# Patient Record
Sex: FEMALE | Race: BLACK OR AFRICAN AMERICAN | Employment: FULL TIME | URBAN - METROPOLITAN AREA
[De-identification: names, ages, dates, MRNs, and addresses within clinical notes are randomized per-mention and may not be internally consistent; named-entity substitution may affect disease eponyms.]

---

## 2017-11-13 ENCOUNTER — OFFICE VISIT (OUTPATIENT)
Dept: INTERNAL MEDICINE CLINIC | Age: 52
End: 2017-11-13

## 2017-11-13 VITALS
HEART RATE: 68 BPM | BODY MASS INDEX: 37.36 KG/M2 | OXYGEN SATURATION: 98 % | HEIGHT: 62 IN | DIASTOLIC BLOOD PRESSURE: 82 MMHG | SYSTOLIC BLOOD PRESSURE: 130 MMHG | WEIGHT: 203 LBS | TEMPERATURE: 98.3 F

## 2017-11-13 DIAGNOSIS — Z76.89 ENCOUNTER TO ESTABLISH CARE: Primary | ICD-10-CM

## 2017-11-13 DIAGNOSIS — G57.00 SCIATIC NEUROPATHY, UNSPECIFIED LATERALITY: ICD-10-CM

## 2017-11-13 DIAGNOSIS — Z00.00 HEALTH CARE MAINTENANCE: ICD-10-CM

## 2017-11-13 LAB
ALBUMIN SERPL-MCNC: 4.2 G/DL (ref 3.4–5)
ALP BLD-CCNC: 39 U/L (ref 40–129)
ALT SERPL-CCNC: 18 U/L (ref 10–40)
ANION GAP SERPL CALCULATED.3IONS-SCNC: 12 MMOL/L (ref 3–16)
AST SERPL-CCNC: 22 U/L (ref 15–37)
BILIRUB SERPL-MCNC: 0.3 MG/DL (ref 0–1)
BILIRUBIN DIRECT: <0.2 MG/DL (ref 0–0.3)
BILIRUBIN, INDIRECT: ABNORMAL MG/DL (ref 0–1)
BUN BLDV-MCNC: 21 MG/DL (ref 7–20)
CALCIUM SERPL-MCNC: 9.8 MG/DL (ref 8.3–10.6)
CHLORIDE BLD-SCNC: 100 MMOL/L (ref 99–110)
CHOLESTEROL, TOTAL: 213 MG/DL (ref 0–199)
CO2: 27 MMOL/L (ref 21–32)
CREAT SERPL-MCNC: 0.8 MG/DL (ref 0.6–1.1)
GFR AFRICAN AMERICAN: >60
GFR NON-AFRICAN AMERICAN: >60
GLUCOSE BLD-MCNC: 78 MG/DL
GLUCOSE BLD-MCNC: 78 MG/DL (ref 70–99)
HBA1C MFR BLD: 5.6 %
HCT VFR BLD CALC: 39.2 % (ref 36–48)
HDLC SERPL-MCNC: 64 MG/DL (ref 40–60)
HEMOGLOBIN: 13.1 G/DL (ref 12–16)
LDL CHOLESTEROL CALCULATED: 133 MG/DL
MCH RBC QN AUTO: 30.5 PG (ref 26–34)
MCHC RBC AUTO-ENTMCNC: 33.4 G/DL (ref 31–36)
MCV RBC AUTO: 91.2 FL (ref 80–100)
PDW BLD-RTO: 13.7 % (ref 12.4–15.4)
PLATELET # BLD: 284 K/UL (ref 135–450)
PMV BLD AUTO: 7.7 FL (ref 5–10.5)
POTASSIUM SERPL-SCNC: 4.7 MMOL/L (ref 3.5–5.1)
RBC # BLD: 4.3 M/UL (ref 4–5.2)
SODIUM BLD-SCNC: 139 MMOL/L (ref 136–145)
TOTAL PROTEIN: 7.2 G/DL (ref 6.4–8.2)
TRIGL SERPL-MCNC: 79 MG/DL (ref 0–150)
TSH REFLEX: 1.12 UIU/ML (ref 0.27–4.2)
VITAMIN D 25-HYDROXY: 26.4 NG/ML
VLDLC SERPL CALC-MCNC: 16 MG/DL
WBC # BLD: 6.5 K/UL (ref 4–11)

## 2017-11-13 PROCEDURE — 82962 GLUCOSE BLOOD TEST: CPT | Performed by: NURSE PRACTITIONER

## 2017-11-13 PROCEDURE — 99386 PREV VISIT NEW AGE 40-64: CPT | Performed by: NURSE PRACTITIONER

## 2017-11-13 PROCEDURE — 83036 HEMOGLOBIN GLYCOSYLATED A1C: CPT | Performed by: NURSE PRACTITIONER

## 2017-11-13 PROCEDURE — 93000 ELECTROCARDIOGRAM COMPLETE: CPT | Performed by: NURSE PRACTITIONER

## 2017-11-13 RX ORDER — ZINC OXIDE 13 %
CREAM (GRAM) TOPICAL
COMMUNITY
End: 2020-02-27

## 2017-11-13 RX ORDER — M-VIT,TX,IRON,MINS/CALC/FOLIC 27MG-0.4MG
1 TABLET ORAL DAILY
COMMUNITY

## 2017-11-13 ASSESSMENT — PATIENT HEALTH QUESTIONNAIRE - PHQ9
2. FEELING DOWN, DEPRESSED OR HOPELESS: 0
SUM OF ALL RESPONSES TO PHQ QUESTIONS 1-9: 0
1. LITTLE INTEREST OR PLEASURE IN DOING THINGS: 0
SUM OF ALL RESPONSES TO PHQ9 QUESTIONS 1 & 2: 0

## 2017-11-13 ASSESSMENT — ENCOUNTER SYMPTOMS: RESPIRATORY NEGATIVE: 1

## 2017-11-13 NOTE — PROGRESS NOTES
Date of Service:  2017    Chief Complaint:   Helen Sharif is a 47 y.o. female who presents as a new patient. TDAP , Pap , Mammogram , due Dec 2017, Colonoscopy   Flu vaccine  2017. HPI    Preventive Care:  Health Maintenance   Topic Date Due    Hepatitis C screen  1965    HIV screen  10/17/1979    DTaP/Tdap/Td vaccine (1 - Tdap) 10/17/1983    Cervical cancer screen  10/17/1985    Lipid screen  10/17/2004    Diabetes screen  10/17/2004    Breast cancer screen  10/17/2014    Colon cancer screen colonoscopy  10/17/2014    Flu vaccine (1) 2017        Hx abnormal PAP: no  Sexual activity: No, Has sex with Male. Self-breast exams: Yes  Last eye exam: Yes, reports . Reports Prescription glasses / contacts. Exercise: walks 7 time(s) per week  Seatbelt use: Yes,   Lipid panel:   Living will: No       There is no immunization history on file for this patient. Allergies   Allergen Reactions    Sulfa Antibiotics Hives     Outpatient Prescriptions Marked as Taking for the 17 encounter (Office Visit) with Zoraida Horne CNP   Medication Sig Dispense Refill    Multiple Vitamins-Minerals (THERAPEUTIC MULTIVITAMIN-MINERALS) tablet Take 1 tablet by mouth daily      Probiotic Product (PROBIOTIC DAILY) CAPS Take by mouth      Chromium Picolinate 200 MCG CAPS Take by mouth daily      FIBER ADULT GUMMIES PO Take by mouth daily         No past medical history on file. Past Surgical History:   Procedure Laterality Date     SECTION      EYE SURGERY Bilateral 1975    FOOT SURGERY Right     MANDIBLE SURGERY      TUMOR REMOVAL       No family history on file. Social History     Social History    Marital status: Single     Spouse name: N/A    Number of children: N/A    Years of education: N/A     Occupational History    Not on file.      Social History Main Topics    Smoking status: Never Smoker    Smokeless tobacco: Never Used   Marleni Carlito Alcohol use No      Comment: occassionally    Drug use: No    Sexual activity: Not on file     Other Topics Concern    Not on file     Social History Narrative    No narrative on file     Review of Systems   Constitutional: Negative. HENT: Negative. Respiratory: Negative. Cardiovascular: Negative. Endocrine: Negative for polydipsia, polyphagia and polyuria. Screening DM, RBS 78 mg/dL, A1C 5.6 %. Results reviewed and discussed with patient during OV. Patient verbalized understanding. Musculoskeletal: Negative. Skin: Negative. Neurological: Negative. Hematological: Negative. Psychiatric/Behavioral: Negative. All other systems reviewed and are negative. PHQ-9 Total Score: 0 (11/13/2017  2:33 PM)      Physical Exam:   Vitals:    11/13/17 1425   BP: 130/82   Site: Right Arm   Position: Sitting   Cuff Size: Medium Adult   Pulse: 68   Temp: 98.3 °F (36.8 °C)   TempSrc: Oral   SpO2: 98%   Weight: 203 lb (92.1 kg)   Height: 5' 2\" (1.575 m)     Body mass index is 37.13 kg/m². Physical Exam   Constitutional: She is oriented to person, place, and time. She appears well-developed and well-nourished. No distress. HENT:   Head: Atraumatic. Mouth/Throat: No oropharyngeal exudate. Eyes: Conjunctivae are normal. No scleral icterus. Neck: Normal range of motion. Neck supple. Cardiovascular: Normal rate and regular rhythm. EKG completed. Results reviewed and discussed with patient during OV. Patient verbalized understanding. Pulmonary/Chest: Effort normal and breath sounds normal. No respiratory distress. Abdominal: Soft. Bowel sounds are normal.   Musculoskeletal: Normal range of motion. Neurological: She is alert and oriented to person, place, and time. Skin: Skin is warm and dry. She is not diaphoretic. Psychiatric: She has a normal mood and affect.  Her behavior is normal. Judgment and thought content normal.   Nursing note and vitals reviewed. Assessment:    New patient to Establish Care  EKG WNLs    . Cezar Lacho received counseling on the following healthy behaviors: nutrition, exercise and medication adherence    Patient given educational materials on Nutrition, Exercise and Hypertension    I have instructed Cezar [place] to complete a self tracking handout on Weightand instructed them to bring it with them to her next appointment. Discussed use, benefit, and side effects of prescribed medications. Barriers to medication compliance addressed. All patient questions answered. Pt voiced understanding. Patient also educated on the importance of being compliant with routine office visits in order to assess chronic illness progression, medication regimen/side effects, and effectiveness of plan of care. Patient was able to verbalize understanding. More than half the time spent on counseling. Patient is in no distress at time of departure. PLAN:    Dash Diet  Diet and Exercise. Drink 64 ounces of water daily. Eat 3-5 servings of vegetables and fruits daily. Take all medications as prescribed. Call 911 or go to the nearest emergency room for chest pain or shortness of breath.    Return in 1 month Physical Exam.

## 2017-11-13 NOTE — PATIENT INSTRUCTIONS
this disease. You may want to have this test before age 72. · Heart attack and stroke risk. At least every 4 to 6 years, you should have your risk for heart attack and stroke assessed. Your doctor uses factors such as your age, blood pressure, cholesterol, and whether you smoke or have diabetes to show what your risk for a heart attack or stroke is over the next 10 years. When should you call for help? Watch closely for changes in your health, and be sure to contact your doctor if you have any problems or symptoms that concern you. Where can you learn more? Go to https://AlchemyAPIpepicCluttereb.GoTaxi(Cabeo). org and sign in to your Tercica account. Enter Q943 in the [facility] box to learn more about \"Well Visit, Women 50 to 72: Care Instructions. \"     If you do not have an account, please click on the \"Sign Up Now\" link. Current as of: July 19, 2016  Content Version: 11.3  © 5992-1863 Musicraiser. Care instructions adapted under license by [facility] ([facility]). If you have questions about a medical condition or this instruction, always ask your healthcare professional. [place] [address] any warranty or liability for your use of this information. Patient Education        DASH Diet: Care Instructions  Your Care Instructions  The DASH diet is an eating plan that can help lower your blood pressure. DASH stands for Dietary Approaches to Stop Hypertension. Hypertension is high blood pressure. The DASH diet focuses on eating foods that are high in calcium, potassium, and magnesium. These nutrients can lower blood pressure. The foods that are highest in these nutrients are fruits, vegetables, low-fat dairy products, nuts, seeds, and legumes. But taking calcium, potassium, and magnesium supplements instead of eating foods that are high in those nutrients does not have the same effect. The DASH diet also includes whole grains, fish, and poultry.   The DASH diet is one of several lemonade. · Eat less than 2,300 milligrams (mg) of sodium a day. If you limit your sodium to 1,500 mg a day, you can lower your blood pressure even more. Tips for success  · Start small. Do not try to make dramatic changes to your diet all at once. You might feel that you are missing out on your favorite foods and then be more likely to not follow the plan. Make small changes, and stick with them. Once those changes become habit, add a few more changes. · Try some of the following:  ¨ Make it a goal to eat a fruit or vegetable at every meal and at snacks. This will make it easy to get the recommended amount of fruits and vegetables each day. ¨ Try yogurt topped with fruit and nuts for a snack or healthy dessert. ¨ Add lettuce, tomato, cucumber, and onion to sandwiches. ¨ Combine a ready-made pizza crust with low-fat mozzarella cheese and lots of vegetable toppings. Try using tomatoes, squash, spinach, broccoli, carrots, cauliflower, and onions. ¨ Have a variety of cut-up vegetables with a low-fat dip as an appetizer instead of chips and dip. ¨ Sprinkle sunflower seeds or chopped almonds over salads. Or try adding chopped walnuts or almonds to cooked vegetables. ¨ Try some vegetarian meals using beans and peas. Add garbanzo or kidney beans to salads. Make burritos and tacos with mashed severino beans or black beans. Where can you learn more? Go to https://Paracor Medicaltyrese.Bright.md. org and sign in to your Claritics account. Enter Q751 in the [facility] box to learn more about \"DASH Diet: Care Instructions. \"     If you do not have an account, please click on the \"Sign Up Now\" link. Current as of: April 3, 2017  Content Version: 11.3  © 8425-2240 [employer]ak [employer]orporated. Care instructions adapted under license by [facility] ([facility]).  If you have questions about a medical condition or this instruction, always ask your healthcare professional. Jen Mobley disclaims any warranty or liability

## 2017-11-14 ENCOUNTER — TELEPHONE (OUTPATIENT)
Dept: INTERNAL MEDICINE CLINIC | Age: 52
End: 2017-11-14

## 2018-06-06 ENCOUNTER — OFFICE VISIT (OUTPATIENT)
Dept: INTERNAL MEDICINE CLINIC | Age: 53
End: 2018-06-06

## 2018-06-06 VITALS
WEIGHT: 201.8 LBS | RESPIRATION RATE: 20 BRPM | BODY MASS INDEX: 37.13 KG/M2 | HEIGHT: 62 IN | HEART RATE: 60 BPM | TEMPERATURE: 98.6 F | SYSTOLIC BLOOD PRESSURE: 116 MMHG | DIASTOLIC BLOOD PRESSURE: 80 MMHG

## 2018-06-06 DIAGNOSIS — Z71.3 DIETARY COUNSELING: ICD-10-CM

## 2018-06-06 DIAGNOSIS — E66.09 CLASS 2 OBESITY DUE TO EXCESS CALORIES WITHOUT SERIOUS COMORBIDITY WITH BODY MASS INDEX (BMI) OF 37.0 TO 37.9 IN ADULT: ICD-10-CM

## 2018-06-06 DIAGNOSIS — E55.9 VITAMIN D DEFICIENCY: ICD-10-CM

## 2018-06-06 DIAGNOSIS — R21 RASH: Primary | ICD-10-CM

## 2018-06-06 PROCEDURE — 99213 OFFICE O/P EST LOW 20 MIN: CPT | Performed by: NURSE PRACTITIONER

## 2018-06-06 RX ORDER — MELATONIN
1 DAILY
Qty: 30 TABLET | Refills: 2 | Status: SHIPPED | OUTPATIENT
Start: 2018-06-06 | End: 2018-10-09 | Stop reason: SDUPTHER

## 2018-06-06 RX ORDER — TRIAMCINOLONE ACETONIDE 0.25 MG/G
CREAM TOPICAL
Qty: 1 G | Refills: 2 | Status: SHIPPED | OUTPATIENT
Start: 2018-06-06 | End: 2019-06-07 | Stop reason: SDUPTHER

## 2018-06-06 ASSESSMENT — ENCOUNTER SYMPTOMS
SHORTNESS OF BREATH: 0
EYE PAIN: 0
RESPIRATORY NEGATIVE: 1
COLOR CHANGE: 0
NAIL CHANGES: 0

## 2018-10-09 ENCOUNTER — OFFICE VISIT (OUTPATIENT)
Dept: INTERNAL MEDICINE CLINIC | Age: 53
End: 2018-10-09
Payer: COMMERCIAL

## 2018-10-09 VITALS
BODY MASS INDEX: 37.06 KG/M2 | DIASTOLIC BLOOD PRESSURE: 78 MMHG | WEIGHT: 201.4 LBS | HEIGHT: 62 IN | TEMPERATURE: 98.3 F | HEART RATE: 64 BPM | SYSTOLIC BLOOD PRESSURE: 106 MMHG

## 2018-10-09 DIAGNOSIS — Z00.00 HEALTHCARE MAINTENANCE: ICD-10-CM

## 2018-10-09 DIAGNOSIS — E55.9 VITAMIN D DEFICIENCY: ICD-10-CM

## 2018-10-09 DIAGNOSIS — Z86.39 H/O ELEVATED LIPIDS: ICD-10-CM

## 2018-10-09 DIAGNOSIS — Z00.00 ANNUAL PHYSICAL EXAM: Primary | ICD-10-CM

## 2018-10-09 DIAGNOSIS — Z00.00 ANNUAL PHYSICAL EXAM: ICD-10-CM

## 2018-10-09 DIAGNOSIS — E66.9 OBESITY (BMI 35.0-39.9 WITHOUT COMORBIDITY): ICD-10-CM

## 2018-10-09 DIAGNOSIS — Z71.3 DIETARY COUNSELING: ICD-10-CM

## 2018-10-09 LAB
ALBUMIN SERPL-MCNC: 4.2 G/DL (ref 3.4–5)
ANION GAP SERPL CALCULATED.3IONS-SCNC: 10 MMOL/L (ref 3–16)
BUN BLDV-MCNC: 16 MG/DL (ref 7–20)
CALCIUM SERPL-MCNC: 9.6 MG/DL (ref 8.3–10.6)
CHLORIDE BLD-SCNC: 105 MMOL/L (ref 99–110)
CHOLESTEROL, TOTAL: 201 MG/DL (ref 0–199)
CO2: 28 MMOL/L (ref 21–32)
CREAT SERPL-MCNC: 0.7 MG/DL (ref 0.6–1.1)
GFR AFRICAN AMERICAN: >60
GFR NON-AFRICAN AMERICAN: >60
GLUCOSE BLD-MCNC: 85 MG/DL (ref 70–99)
HCT VFR BLD CALC: 40 % (ref 36–48)
HDLC SERPL-MCNC: 59 MG/DL (ref 40–60)
HEMOGLOBIN: 12.8 G/DL (ref 12–16)
LDL CHOLESTEROL CALCULATED: 131 MG/DL
MCH RBC QN AUTO: 29.6 PG (ref 26–34)
MCHC RBC AUTO-ENTMCNC: 32.1 G/DL (ref 31–36)
MCV RBC AUTO: 92 FL (ref 80–100)
PDW BLD-RTO: 13.9 % (ref 12.4–15.4)
PHOSPHORUS: 3.8 MG/DL (ref 2.5–4.9)
PLATELET # BLD: 288 K/UL (ref 135–450)
PMV BLD AUTO: 7.8 FL (ref 5–10.5)
POTASSIUM SERPL-SCNC: 4.9 MMOL/L (ref 3.5–5.1)
RBC # BLD: 4.34 M/UL (ref 4–5.2)
SODIUM BLD-SCNC: 143 MMOL/L (ref 136–145)
TRIGL SERPL-MCNC: 56 MG/DL (ref 0–150)
VITAMIN D 25-HYDROXY: 29.5 NG/ML
VLDLC SERPL CALC-MCNC: 11 MG/DL
WBC # BLD: 4.1 K/UL (ref 4–11)

## 2018-10-09 PROCEDURE — 99396 PREV VISIT EST AGE 40-64: CPT | Performed by: NURSE PRACTITIONER

## 2018-10-09 RX ORDER — MELATONIN
1 DAILY
Qty: 30 TABLET | Refills: 3 | Status: SHIPPED | OUTPATIENT
Start: 2018-10-09 | End: 2020-02-27 | Stop reason: SDUPTHER

## 2018-10-09 ASSESSMENT — ENCOUNTER SYMPTOMS
EYES NEGATIVE: 1
RESPIRATORY NEGATIVE: 1
GASTROINTESTINAL NEGATIVE: 1
ALLERGIC/IMMUNOLOGIC NEGATIVE: 1

## 2018-10-09 ASSESSMENT — PATIENT HEALTH QUESTIONNAIRE - PHQ9
SUM OF ALL RESPONSES TO PHQ QUESTIONS 1-9: 0
SUM OF ALL RESPONSES TO PHQ9 QUESTIONS 1 & 2: 0
1. LITTLE INTEREST OR PLEASURE IN DOING THINGS: 0
SUM OF ALL RESPONSES TO PHQ QUESTIONS 1-9: 0
2. FEELING DOWN, DEPRESSED OR HOPELESS: 0

## 2018-10-09 NOTE — PROGRESS NOTES
Mother      Social History     Social History    Marital status: Single     Spouse name: N/A    Number of children: 2    Years of education: [address]     Fulltime     Social History Main Topics    Smoking status: Never Smoker    Smokeless tobacco: Never Used    Alcohol use No      Comment: occassionally    Drug use: No    Sexual activity: No     Other Topics Concern    Not on file     Social History Narrative    Live with mother and daughter. Jehovah witness. Orders Placed This Encounter   Procedures    Renal Function Panel     Standing Status:   Future     Number of Occurrences:   1     Standing Expiration Date:   10/9/2019    Vitamin D 25 Hydroxy     Standing Status:   Future     Number of Occurrences:   1     Standing Expiration Date:   10/9/2019    CBC     Standing Status:   Future     Number of Occurrences:   1     Standing Expiration Date:   10/9/2019    Lipid Panel     Standing Status:   Future     Number of Occurrences:   1     Standing Expiration Date:   10/9/2019     Order Specific Question:   Is Patient Fasting?/# of Hours     Answer:   Suresh Terry MD     Referral Priority:   Routine     Referral Type:   Consult for Advice and Opinion     Referral Reason:   Specialty Services Required     Referred to Provider:   Sixto Fuentes MD     Requested Specialty:   Obstetrics & Gynecology     Number of Visits Requested:   1       HPI    Review of Systems   Constitutional: Negative. HENT: Negative. Eyes: Negative. Respiratory: Negative. Cardiovascular: Negative. Gastrointestinal: Negative. Endocrine: Negative. Genitourinary: Negative. Musculoskeletal: Negative. Skin: Negative. Allergic/Immunologic: Negative. Neurological: Negative. Hematological: Negative. Psychiatric/Behavioral: Negative. All other systems reviewed and are negative.       Objective:

## 2018-10-09 NOTE — PATIENT INSTRUCTIONS
DIET / EXERCISE    DRINK 64 OUNCES OF WATER DAILY. TAKE ALL MEDICATIONS DAILY AS PRESCRIBED. PATIENT TO SCHEDULE APPOINTMENT FOR PAP SMEAR. LABS    CALL 911 OR GO TO THE NEAREST EMERGENCY ROOM FOR CHEST PAIN / SHORTNESS OF BREATH. RETURN IN 6 MONTHS FOR F/U Vitamin D DEFICIENCY / AS NEEDED. Patient Education        Well Visit, Women 48 to 72: Care Instructions  Your Care Instructions    Physical exams can help you stay healthy. Your doctor has checked your overall health and may have suggested ways to take good care of yourself. He or she also may have recommended tests. At home, you can help prevent illness with healthy eating, regular exercise, and other steps. Follow-up care is a key part of your treatment and safety. Be sure to make and go to all appointments, and call your doctor if you are having problems. It's also a good idea to know your test results and keep a list of the medicines you take. How can you care for yourself at home? · Reach and stay at a healthy weight. This will lower your risk for many problems, such as obesity, diabetes, heart disease, and high blood pressure. · Get at least 30 minutes of exercise on most days of the week. Walking is a good choice. You also may want to do other activities, such as running, swimming, cycling, or playing tennis or team sports. · Do not smoke. Smoking can make health problems worse. If you need help quitting, talk to your doctor about stop-smoking programs and medicines. These can increase your chances of quitting for good. · Protect your skin from too much sun. When you're outdoors from 10 a.m. to 4 p.m., stay in the shade or cover up with clothing and a hat with a wide brim. Wear sunglasses that block UV rays. Even when it's cloudy, put broad-spectrum sunscreen (SPF 30 or higher) on any exposed skin. · See a dentist one or two times a year for checkups and to have your teeth cleaned. · Wear a seat belt in the car.   · Limit added to many foods. And your body uses sunshine to make its own vitamin D. How much vitamin D do you need? The [place] of [employer] recommends that people ages 3 through 79 get 600 IU (international units) every day. Adults 71 and older need 800 IU every day. Blood tests for vitamin D can check your vitamin D level. But there is no standard normal range used by all laboratories. The [place] of [employer] recommends a blood level of 20 ng/mL of vitamin D for healthy bones. And most people in the [place] and [facility] acute hospital) meet this goal.  How can you get more vitamin D? Foods that contain vitamin D include:  · Dryfork, tuna, and mackerel. These are some of the best foods to eat when you need to get more vitamin D.  · Cheese, egg yolks, and beef liver. These foods have vitamin D in small amounts. · Milk, soy drinks, orange juice, yogurt, margarine, and some kinds of cereal have vitamin D added to them. Some people don't make vitamin D as well as others. They may have to take extra care in getting enough vitamin D. Things that reduce how much vitamin D your body makes include:  · Dark skin, such as many  Americans have. · Age, especially if you are older than 72. · Digestive problems, such as Crohn's or celiac disease. · Liver and kidney disease. Some people who do not get enough vitamin D may need supplements. Are there any risks from taking vitamin D?  · Too much vitamin D:  ¨ Can damage your kidneys. ¨ Can cause nausea and vomiting, constipation, and weakness. ¨ Raises the amount of calcium in your blood. If this happens, you can get confused or have an irregular heart rhythm. · Vitamin D may interact with other medicines. Tell your doctor about all of the medicines you take, including over-the-counter drugs, herbs, and pills. Tell your doctor about all of your current medical problems. Where can you learn more? Go to [email or web address]://kaylan.Ocean [email or web address] org and sign in to your Surma Enterprisehart account.

## 2018-10-23 ENCOUNTER — OFFICE VISIT (OUTPATIENT)
Dept: INTERNAL MEDICINE CLINIC | Age: 53
End: 2018-10-23
Payer: COMMERCIAL

## 2018-10-23 VITALS
HEIGHT: 62 IN | OXYGEN SATURATION: 98 % | DIASTOLIC BLOOD PRESSURE: 80 MMHG | TEMPERATURE: 98.9 F | SYSTOLIC BLOOD PRESSURE: 100 MMHG | HEART RATE: 59 BPM | BODY MASS INDEX: 37.36 KG/M2 | WEIGHT: 203 LBS | RESPIRATION RATE: 16 BRPM

## 2018-10-23 DIAGNOSIS — R42 VERTIGO: Primary | ICD-10-CM

## 2018-10-23 DIAGNOSIS — E66.9 CLASS 2 OBESITY WITHOUT SERIOUS COMORBIDITY WITH BODY MASS INDEX (BMI) OF 37.0 TO 37.9 IN ADULT, UNSPECIFIED OBESITY TYPE: ICD-10-CM

## 2018-10-23 DIAGNOSIS — Z71.3 DIETARY COUNSELING: ICD-10-CM

## 2018-10-23 PROCEDURE — 99213 OFFICE O/P EST LOW 20 MIN: CPT | Performed by: NURSE PRACTITIONER

## 2018-10-23 RX ORDER — MECLIZINE HCL 12.5 MG/1
12.5 TABLET ORAL 2 TIMES DAILY PRN
Qty: 20 TABLET | Refills: 0 | Status: SHIPPED | OUTPATIENT
Start: 2018-10-23 | End: 2018-11-02

## 2018-10-23 ASSESSMENT — ENCOUNTER SYMPTOMS: RESPIRATORY NEGATIVE: 1

## 2018-10-23 NOTE — PATIENT INSTRUCTIONS
DIET / EXERCISE    DRINK 64 OUNCES OF WATER DAILY. TAKE ALL MEDICATIONS DAILY ASPRESCRIBED. PATIENT TO SCHEDULE APPOINTMENT WITH ENT. CALL 911 OR GO TO THE NEAREST EMERGENCY ROOM FOR CHEST PAIN / SHORTNESS OF BREATH. RETURN IN 6 MONTHS / AS NEEDED. Patient Education        Dizziness: Care Instructions  Your Care Instructions  Dizziness is the feeling of unsteadiness or fuzziness in your head. It is different than having vertigo, which is a feeling that the room is spinning or that you are moving or falling. It is also different from lightheadedness, which is the feeling that you are about to faint. It can be hard to know what causes dizziness. Some people feel dizzy when they have migraine headaches. Sometimes bouts of flu can make you feel dizzy. Some medical conditions, such as heart problems or high blood pressure, can make you feel dizzy. Many medicines can cause dizziness, including medicines for high blood pressure, pain, or anxiety. If a medicine causes your symptoms, your doctor may recommend that you stop or change the medicine. If it is a problem with your heart, you may need medicine to help your heart work better. If there is no clear reason for your symptoms, your doctor may suggest watching and waiting for a while to see if the dizziness goes away on its own. Follow-up care is a key part of your treatment and safety. Be sure to make and go to all appointments, and call your doctor if you are having problems. It's also a good idea to know your test results and keep a list of the medicines you take. How can you care for yourself at home? · If your doctor recommends or prescribes medicine, take it exactly as directed. Call your doctor if you think you are having a problem with your medicine. · Do not drive while you feel dizzy. · Try to prevent falls. Steps you can take include:  ¨ Using nonskid mats, adding grab bars near the tub, and using night-lights.   ¨ Clearing your home so that walkways are free of anything you might trip on. ¨ Letting family and friends know that you have been feeling dizzy. This will help them know how to help you. When should you call for help? Call 911 anytime you think you may need emergency care. For example, call if:    · You passed out (lost consciousness).     · You have dizziness along with symptoms of a heart attack. These may include:  ¨ Chest pain or pressure, or a strange feeling in the chest.  ¨ Sweating. ¨ Shortness of breath. ¨ Nausea or vomiting. ¨ Pain, pressure, or a strange feeling in the back, neck, jaw, or upper belly or in one or both shoulders or arms. ¨ Lightheadedness or sudden weakness. ¨ A fast or irregular heartbeat.     · You have symptoms of a stroke. These may include:  ¨ Sudden numbness, tingling, weakness, or loss of movement in your face, arm, or leg, especially on only one side of your body. ¨ Sudden vision changes. ¨ Sudden trouble speaking. ¨ Sudden confusion or trouble understanding simple statements. ¨ Sudden problems with walking or balance. ¨ A sudden, severe headache that is different from past headaches.    Call your doctor now or seek immediate medical care if:    · You feel dizzy and have a fever, headache, or ringing in your ears.     · You have new or increased nausea and vomiting.     · Your dizziness does not go away or comes back.    Watch closely for changes in your health, and be sure to contact your doctor if:    · You do not get better as expected. Where can you learn more? Go to https://Tailored FitloniSkin Analytics.RupeeTimes. org and sign in to your Agora Mobile account. Enter I279 in the Fishin' Glue box to learn more about \"Dizziness: Care Instructions. \"     If you do not have an account, please click on the \"Sign Up Now\" link. Current as of: November 20, 2017  Content Version: 11.7  © 9690-1155 Verismo Networks, Incorporated. Care instructions adapted under license by [facility] ([facility]).  If you have questions about a medical condition or this instruction, always ask your healthcare professional. [address] any warranty or liability for your use of this information. Patient Education        Learning About Healthy Weight  What is a healthy weight? A healthy weight is the weight at which you feel good about yourself and have energy for work and play. It's also one that lowers your risk for health problems. What can you do to stay at a healthy weight? It can be hard to stay at a healthy weight, especially when fast food, vending-machine snacks, and processed foods are so easy to find. And with your busy lifestyle, activity may be low on your list of things to do. But staying at a healthy weight may be easier than you think. Here are some dos and don'ts for staying at a healthy weight:  Do eat healthy foods  The kinds of foods you eat have a big impact on both your weight and your health. Reaching and staying at a healthy weight is not about going on a diet. It's about making healthier food choices every day and changing your diet for good. Healthy eating means eating a variety of foods so that you get all the nutrients you need. Your body needs protein, carbohydrate, and fats for energy. They keep your heart beating, your brain active, and your muscles working. On most days, try to eat from each food group. This means eating a variety of:  · Whole grains, such as whole wheat breads and pastas. · Fruits and vegetables. · Dairy products, such as low-fat milk, yogurt, and cheese. · Lean proteins, such as all types of fish, chicken without the skin, and beans. Don't have too much or too little of one thing. All foods, if eaten in moderation, can be part of healthy eating. Even sweets can be okay. If your favorite foods are high in fat, salt, sugar, or calories, limit how often you eat them. Eat smaller servings, or look for healthy substitutes.   Do watch what you eat  Many you limit your sodium to 1,500 mg a day, you can lower your blood pressure even more. Tips for success  · Start small. Do not try to make dramatic changes to your diet all at once. You might feel that you are missing out on your favorite foods and then be more likely to not follow the plan. Make small changes, and stick with them. Once those changes become habit, add a few more changes. · Try some of the following:  ¨ Make it a goal to eat a fruit or vegetable at every meal and at snacks. This will make it easy to get the recommended amount of fruits and vegetables each day. ¨ Try yogurt topped with fruit and nuts for a snack or healthy dessert. ¨ Add lettuce, tomato, cucumber, and onion to sandwiches. ¨ Combine a ready-made pizza crust with low-fat mozzarella cheese and lots of vegetable toppings. Try using tomatoes, squash, spinach, broccoli, carrots, cauliflower, and onions. ¨ Have a variety of cut-up vegetables with a low-fat dip as an appetizer instead of chips and dip. ¨ Sprinkle sunflower seeds or chopped almonds over salads. Or try adding chopped walnuts or almonds to cooked vegetables. ¨ Try some vegetarian meals using beans and peas. Add garbanzo or kidney beans to salads. Make burritos and tacos with mashed severino beans or black beans. Where can you learn more? Go to https://Playtikatyrese.healthVoices Heard Media. org and sign in to your FAD ? IO account. Enter S456 in the [facility] box to learn more about \"DASH Diet: Care Instructions. \"     If you do not have an account, please click on the \"Sign Up Now\" link. Current as of: December 6, 2017  Content Version: 11.7  © 3362-8710 [employer] Doctor Gadget Company, Incorporated. Care instructions adapted under license by [facility] ([facility]). If you have questions about a medical condition or this instruction, always ask your healthcare professional. [place] [address] any warranty or liability for your use of this information.

## 2018-11-08 PROBLEM — Z00.00 ANNUAL PHYSICAL EXAM: Status: RESOLVED | Noted: 2018-10-09 | Resolved: 2018-11-08

## 2019-05-06 ENCOUNTER — TELEPHONE (OUTPATIENT)
Dept: INTERNAL MEDICINE CLINIC | Age: 54
End: 2019-05-06

## 2019-05-09 ENCOUNTER — OFFICE VISIT (OUTPATIENT)
Dept: INTERNAL MEDICINE CLINIC | Age: 54
End: 2019-05-09
Payer: COMMERCIAL

## 2019-05-09 VITALS
OXYGEN SATURATION: 96 % | DIASTOLIC BLOOD PRESSURE: 80 MMHG | BODY MASS INDEX: 38.61 KG/M2 | SYSTOLIC BLOOD PRESSURE: 112 MMHG | WEIGHT: 209.8 LBS | HEIGHT: 62 IN | TEMPERATURE: 98.1 F | HEART RATE: 66 BPM | RESPIRATION RATE: 18 BRPM

## 2019-05-09 DIAGNOSIS — M79.89 SWELLING OF RIGHT THUMB: Primary | ICD-10-CM

## 2019-05-09 DIAGNOSIS — M79.89 SWELLING OF RIGHT THUMB: ICD-10-CM

## 2019-05-09 DIAGNOSIS — Z23 NEED FOR PROPHYLACTIC VACCINATION AND INOCULATION AGAINST VARICELLA: ICD-10-CM

## 2019-05-09 DIAGNOSIS — Z12.31 ENCOUNTER FOR SCREENING MAMMOGRAM FOR BREAST CANCER: ICD-10-CM

## 2019-05-09 PROCEDURE — 99213 OFFICE O/P EST LOW 20 MIN: CPT | Performed by: NURSE PRACTITIONER

## 2019-05-09 RX ORDER — INDOMETHACIN 50 MG/1
50 CAPSULE ORAL 3 TIMES DAILY
Qty: 30 CAPSULE | Refills: 0 | Status: SHIPPED | OUTPATIENT
Start: 2019-05-09 | End: 2020-02-27 | Stop reason: ALTCHOICE

## 2019-05-09 ASSESSMENT — PATIENT HEALTH QUESTIONNAIRE - PHQ9
2. FEELING DOWN, DEPRESSED OR HOPELESS: 0
SUM OF ALL RESPONSES TO PHQ QUESTIONS 1-9: 0
1. LITTLE INTEREST OR PLEASURE IN DOING THINGS: 0
SUM OF ALL RESPONSES TO PHQ QUESTIONS 1-9: 0
SUM OF ALL RESPONSES TO PHQ9 QUESTIONS 1 & 2: 0

## 2019-05-09 NOTE — PATIENT INSTRUCTIONS
DIET / EXERCISE     PATIENT TO SCHEDULE APPOINTMENT WITH ORTHOPAEDIC SPECIALIST. CALL 911 OR GO TO THE NEAREST EMERGENCY ROOM FOR CHEST PAIN / SHORTNESS OF BREATH. RETURN IN 3 MONTHS FOR F/U Vitamin D def / AS NEEDED. Patient Education        Gout: Care Instructions  Your Care Instructions    Gout is a form of arthritis caused by a buildup of uric acid crystals in a joint. It causes sudden attacks of pain, swelling, redness, and stiffness, usually in one joint, especially the big toe. Gout usually comes on without a cause. But it can be brought on by drinking alcohol (especially beer) or eating seafood and red meat. Taking certain medicines, such as diuretics or aspirin, also can bring on an attack of gout. Taking your medicines as prescribed and following up with your doctor regularly can help you avoid gout attacks in the future. Follow-up care is a key part of your treatment and safety. Be sure to make and go to all appointments, and call your doctor if you are having problems. It's also a good idea to know your test results and keep a list of the medicines you take. How can you care for yourself at home? · If the joint is swollen, put ice or a cold pack on the area for 10 to 20 minutes at a time. Put a thin cloth between the ice and your skin. · Prop up the sore limb on a pillow when you ice it or anytime you sit or lie down during the next 3 days. Try to keep it above the level of your heart. This will help reduce swelling. · Rest sore joints. Avoid activities that put weight or strain on the joints for a few days. Take short rest breaks from your regular activities during the day. · Take your medicines exactly as prescribed. Call your doctor if you think you are having a problem with your medicine. · Take pain medicines exactly as directed. ? If the doctor gave you a prescription medicine for pain, take it as prescribed.   ? If you are not taking a prescription pain medicine, ask your doctor if you can take an over-the-counter medicine. · Eat less seafood and red meat. · Check with your doctor before drinking alcohol. · Losing weight, if you are overweight, may help reduce attacks of gout. But do not go on a [employer]. \" Losing a lot of weight in a short amount of time can cause a gout attack. When should you call for help? Call your doctor now or seek immediate medical care if:    · You have a fever.     · The joint is so painful you cannot use it.     · You have sudden, unexplained swelling, redness, warmth, or severe pain in one or more joints.    Watch closely for changes in your health, and be sure to contact your doctor if:    · You have joint pain.     · Your symptoms get worse or are not improving after 2 or 3 days. Where can you learn more? Go to https://noodlspeAudio Shackeb.Insp[employer] Arts & Media. org and sign in to your [employer] account. Enter Q316 in the [employer] box to learn more about \"Gout: Care Instructions. \"     If you do not have an account, please click on the \"Sign Up Now\" link. Current as of: Amelia 10, 2018  Content Version: 12.0  © 8462-3402 Healthwise, [employer]. Care instructions adapted under license by [facility]. If you have questions about a medical condition or this instruction, always ask your healthcare professional. [address] any warranty or liability for your use of this information. Patient Education        Arthritis: Care Instructions  Your Care Instructions  Arthritis, also called osteoarthritis, is a breakdown of the cartilage that cushions your joints. When the cartilage wears down, your bones rub against each other. This causes pain and stiffness. Many people have some arthritis as they age. Arthritis most often affects the joints of the spine, hands, hips, knees, or feet. You can take simple measures to protect your joints, ease your pain, and help you stay active.   Follow-up care is a key part of your treatment and safety. Be sure to make and go to all appointments, and call your doctor if you are having problems. It's also a good idea to know your test results and keep a list of the medicines you take. How can you care for yourself at home? · Stay at a healthy weight. Being overweight puts extra strain on your joints. · Talk to your doctor or physical therapist about exercises that will help ease joint pain. ? Stretch. You may enjoy gentle forms of yoga to help keep your joints and muscles flexible. ? Walk instead of jog. Other types of exercise that are less stressful on the joints include riding a bicycle, swimming, chance chi, or water exercise. ? Lift weights. Strong muscles help reduce stress on your joints. Stronger thigh muscles, for example, take some of the stress off of the knees and hips. Learn the right way to lift weights so you do not make joint pain worse. · Take your medicines exactly as prescribed. Call your doctor if you think you are having a problem with your medicine. · Take pain medicines exactly as directed. ? If the doctor gave you a prescription medicine for pain, take it as prescribed. ? If you are not taking a prescription pain medicine, ask your doctor if you can take an over-the-counter medicine. · Use a cane, crutch, walker, or another device if you need help to get around. These can help rest your joints. You also can use other things to make life easier, such as a higher toilet seat and padded handles on kitchen utensils. · Do not sit in low chairs, which can make it hard to get up. · Put heat or cold on your sore joints as needed. Use whichever helps you most. You also can take turns with hot and cold packs. ? Apply heat 2 or 3 times a day for 20 to 30 minutes--using a heating pad, hot shower, or hot pack--to relieve pain and stiffness. ? Put ice or a cold pack on your sore joint for 10 to 20 minutes at a time. Put a thin cloth between the ice and your skin.   When should you call for help? Call your doctor now or seek immediate medical care if:    · You have sudden swelling, warmth, or pain in any joint.     · You have joint pain and a fever or rash.     · You have such bad pain that you cannot use a joint.    Watch closely for changes in your health, and be sure to contact your doctor if:    · You have mild joint symptoms that continue even with more than 6 weeks of care at home.     · You have stomach pain or other problems with your medicine. Where can you learn more? Go to https://[employer] CorporationpeViaSat.[employer]. org and sign in to your [employer] account. Enter Z393 in the [employer] box to learn more about \"Arthritis: Care Instructions. \"     If you do not have an account, please click on the \"Sign Up Now\" link. Current as of: Amelia 10, 2018  Content Version: 12.0  © 9108-7478 [employer]. Care instructions adapted under license by [facility] [employer] [facility] ([facility]). If you have questions about a medical condition or this instruction, always ask your healthcare professional. [address] any warranty or liability for your use of this information. Patient Education        Purine-Restricted Diet: Care Instructions  Your Care Instructions    Purines are substances that are found in some foods. Your body turns purines into uric acid. High levels of uric acid can cause gout, which is a form of arthritis that causes pain and inflammation in joints. You may be able to help control the amount of uric acid in your body by limiting high-purine foods in your diet. Follow-up care is a key part of your treatment and safety. Be sure to make and go to all appointments, and call your doctor if you are having problems. It's also a good idea to know your test results and keep a list of the medicines you take. How can you care for yourself at home? · Plan your meals and snacks around foods that are low in purines and are safe for you to eat.  These foods treat.  · Pap test and pelvic exam. Ask your doctor how often you should have a Pap test. You may not need to have a Pap test as often as you used to. · Vision. Have your eyes checked every year or two or as often as your doctor suggests. Some experts recommend that you have yearly exams for glaucoma and other age-related eye problems starting at age 48. · Hearing. Tell your doctor if you notice any change in your hearing. You can have tests to find out how well you hear. · Diabetes. Ask your doctor whether you should have tests for diabetes. · Colorectal cancer. Your risk for colorectal cancer gets higher as you get older. Some experts say that adults should start regular screening at age 48 and stop at age 76. Others say to start before age 48 or continue after age 76. Talk with your doctor about your risk and when to start and stop screening. · Thyroid disease. Talk to your doctor about whether to have your thyroid checked as part of a regular physical exam. Women have an increased chance of a thyroid problem. · Osteoporosis. You should begin tests for bone density at age 72. If you are younger than 72, ask your doctor whether you have factors that may increase your risk for this disease. You may want to have this test before age 72. · Heart attack and stroke risk. At least every 4 to 6 years, you should have your risk for heart attack and stroke assessed. Your doctor uses factors such as your age, blood pressure, cholesterol, and whether you smoke or have diabetes to show what your risk for a heart attack or stroke is over the next 10 years. When should you call for help? Watch closely for changes in your health, and be sure to contact your doctor if you have any problems or symptoms that concern you. Where can you learn more? Go to https://kaylan.healthNanorex. org and sign in to your [employer] account.  Enter M249 in the [facility] box to learn more about \"Well Visit, Women 50 to 65: Care Instructions. \"     If you do not have an account, please click on the \"Sign Up Now\" link. Current as of: December 13, 2018  Content Version: 12.0  © 1946-8124 Healthwise, Incorporated. Care instructions adapted under license by [facility] ([facility]). If you have questions about a medical condition or this instruction, always ask your healthcare professional. [address] any warranty or liability for your use of this information.

## 2019-05-10 LAB — URIC ACID, SERUM: 4 MG/DL (ref 2.6–6)

## 2019-05-15 ENCOUNTER — OFFICE VISIT (OUTPATIENT)
Dept: ORTHOPEDIC SURGERY | Age: 54
End: 2019-05-15
Payer: COMMERCIAL

## 2019-05-15 VITALS — BODY MASS INDEX: 37.91 KG/M2 | WEIGHT: 206 LBS | HEIGHT: 62 IN | RESPIRATION RATE: 16 BRPM

## 2019-05-15 DIAGNOSIS — M65.311 TRIGGER THUMB OF RIGHT HAND: Primary | ICD-10-CM

## 2019-05-15 PROBLEM — M65.312 TRIGGER THUMB OF LEFT HAND: Status: ACTIVE | Noted: 2019-05-15

## 2019-05-15 PROCEDURE — 20550 NJX 1 TENDON SHEATH/LIGAMENT: CPT | Performed by: ORTHOPAEDIC SURGERY

## 2019-05-15 PROCEDURE — 99243 OFF/OP CNSLTJ NEW/EST LOW 30: CPT | Performed by: ORTHOPAEDIC SURGERY

## 2019-05-15 NOTE — PROGRESS NOTES
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 Symptoms have been present for 2 weeks. The digit is stiff, especially in the morning and will frequently stick in the palm when flexed and pop when extended. This is frequently associated with pain. Mild swelling has been noticed. Treatment has not been prescribed. The problem has worsened recently. There is no history of injury or significant overuse. The pain assessment has been reviewed and is correct as stated. .    The patient's social history, past medical history, family history, medications, allergies and review of systems, entered 5/15/19, have been reviewed, and dated and are recorded in the chart. On examination the patient is Height: 5' 1.5\" (156.2 cm) tall and weighs Weight: 206 lb (93.4 kg). Respirations are 18 per minute. The patient is well nourished, is oriented to time and place, demonstrates appropriate mood and affect as well as normal gait and station. There is mild soft tissue swelling of the digit. There is no deformity. There is tenderness, thickening and nodularity at the base of the flexor tendon sheath. Range of motion is slightly limited in flexion and extension. The digit sticks in flexion and pops into extension, accompanied by some pain. Skin is intact without lesions. Distal circulation and sensation are intact. Muscle strength and coordination are normal.  Reflexes are present bilaterally. Joints are stable. There are no subcutaneous nodules or enlarged epitrochlear lymph nodes. Impression: Right thumb trigger digit. The nature of this medical problem is fully discussed with the patient, including all treatment options. All questions are answered.     The right  hand is prepared with Betadine and alcohol and the flexor tendon sheath of the right thumb is injected with 1/2 milliliter of 1% lidocaine and 20 mg.of triamcinalone, with good filling. The patient is advised regarding the expected response and possible reactions from the injection. The patient is asked to call me if full, painless function has not returned within 4 weeks. The possibility of recurrence of the problem is discussed.

## 2019-06-07 DIAGNOSIS — R21 RASH: ICD-10-CM

## 2019-06-11 RX ORDER — TRIAMCINOLONE ACETONIDE 0.25 MG/G
CREAM TOPICAL
Qty: 1 G | Refills: 1 | Status: SHIPPED | OUTPATIENT
Start: 2019-06-11 | End: 2021-10-27

## 2020-01-06 LAB
PAP SMEAR: NORMAL
PAP SMEAR: NORMAL

## 2020-02-12 ENCOUNTER — TELEPHONE (OUTPATIENT)
Dept: INTERNAL MEDICINE CLINIC | Age: 55
End: 2020-02-12

## 2020-02-27 ENCOUNTER — OFFICE VISIT (OUTPATIENT)
Dept: INTERNAL MEDICINE CLINIC | Age: 55
End: 2020-02-27
Payer: COMMERCIAL

## 2020-02-27 VITALS
WEIGHT: 198.5 LBS | OXYGEN SATURATION: 98 % | SYSTOLIC BLOOD PRESSURE: 120 MMHG | BODY MASS INDEX: 36.53 KG/M2 | HEIGHT: 62 IN | HEART RATE: 64 BPM | TEMPERATURE: 98.1 F | DIASTOLIC BLOOD PRESSURE: 80 MMHG

## 2020-02-27 PROCEDURE — 99214 OFFICE O/P EST MOD 30 MIN: CPT | Performed by: INTERNAL MEDICINE

## 2020-02-27 RX ORDER — SENNA PLUS 8.6 MG/1
1 TABLET ORAL DAILY
COMMUNITY

## 2020-02-27 RX ORDER — MELATONIN
1 DAILY
Qty: 30 TABLET | Refills: 3 | Status: SHIPPED | OUTPATIENT
Start: 2020-02-27 | End: 2020-06-25 | Stop reason: DRUGHIGH

## 2020-02-27 ASSESSMENT — PATIENT HEALTH QUESTIONNAIRE - PHQ9
2. FEELING DOWN, DEPRESSED OR HOPELESS: 0
SUM OF ALL RESPONSES TO PHQ QUESTIONS 1-9: 0
SUM OF ALL RESPONSES TO PHQ QUESTIONS 1-9: 0
SUM OF ALL RESPONSES TO PHQ9 QUESTIONS 1 & 2: 0
1. LITTLE INTEREST OR PLEASURE IN DOING THINGS: 0

## 2020-02-27 NOTE — PATIENT INSTRUCTIONS
TAKE MED AS ADVISED    DIET/ EXERCISE.     FOLLOW UP WITHIN 3-4 MONTHS / AS NEEDED    FOLLOW UP FOR FASTING LABS, ORTHO

## 2020-02-27 NOTE — PROGRESS NOTES
SUBJECTIVE:  Marguerite Núñez is a 53 y.o. female [address] [place]   Chief Complaint   Patient presents with    New Patient      PT HERE TO INITIATE CARE        PREVIOUS PCP: Gabby Pinto - CNP      HLP - NOT ON MED. ? EXERCISE / DIET COMPLIANCE .   VIT D DEF - ? MED COMPLIANCE. PREVIOUS LABS D/W PT  ARTHRITIS - RADHA KNEES. SHARP PAIN, ? RAD, PAIN SCALE 7/10. NO SWELLING. NO RECENT TRAUMA. PT. NEEDS REFERRAL TO NEW ORTHO MD   OBESITY - DIET / EXERCISE REVIEWED. WT NOTED  + FH DM - DIET / EXERCISE REVIEWED. PT REQUESTING LAB  SCREENING MAMMOGRAM D/W PT  SCREENING LABS D/W PT    DENIES CP, No SOB, No PALPITATIONS, No COUGH  No ABD PAIN, No N/V, No DIARRHEA, No CONSTIPATION, No MELENA, No HEMATOCHEZIA. No DYSURIA, No FREQ, No URGENCY, No HEMATURIA    PMH: REVIEWED AND UPDATED TODAY    PSH: REVIEWED AND UPDATED TODAY    SOCIAL HX: REVIEWED AND UPDATED TODAY    FAMILY HX: REVIEWED AND UPDATED TODAY    ALLERGY:  Sulfa antibiotics    MEDS: REVIEWED  Prior to Visit Medications    Medication Sig Taking?  Authorizing Provider   senna (SENOKOT) 8.6 MG tablet Take 1 tablet by mouth daily Yes Historical Provider, MD   Cholecalciferol (VITAMIN D3) 25 MCG (1000 UT) TABS Take 1 tablet by mouth daily Yes Swapna Hanley MD   NONFORMULARY BIOMED COMPOUND FOR KNEE Yes Historical Provider, MD   triamcinolone (KENALOG) 0.025 % cream APPLY TOPICALLY TO RASH TWO TIMES A DAY AS NEEDED Yes Jossie Espinosa, APRN - CNP   Multiple Vitamins-Minerals (THERAPEUTIC MULTIVITAMIN-MINERALS) tablet Take 1 tablet by mouth daily Yes Historical Provider, MD   Chromium Picolinate 200 MCG CAPS Take by mouth 2 times daily  Yes Historical Provider, MD         OB/GYN: POST MENOPAUSAL                  LAST PAP SMEAR 1/2020                  LAST MAMMOGRAM 2018    IMMUNIZATION: INFLUENZA 2019, TETANUS < 10YRS    ROS: COMPREHENSIVE ROS AS IN HX, REST -VE  History obtained from chart review and the patient    OBJECTIVE:   NURSING NOTE AND VITALS REVIEWED  /80 (Site: Right Upper Arm, Position: Sitting, Cuff Size: Large Adult)   Pulse 64   Temp 98.1 °F (36.7 °C)   Ht 5' 1.5\" (1.562 m)   Wt 198 lb 8 oz (90 kg)   SpO2 98%   Breastfeeding No   BMI 36.90 kg/m²     NO ACUTE DISTRESS    REPEAT BP: 120/80 (RT), NO ORTHOSTASIS     Body mass index is 36.9 kg/m². HEENT: NO PALLOR, ANICTERIC, PERRLA, EOMI, NO CONJUNCTIVAL ERYTHEMA,                 NO SINUS TENDERNESS  NECK:  SUPPLE, TRACHEA MIDLINE, NT, NO JVD, NO CB, NO LA, NO TM, NO STIFFNESS  CHEST: RESPY EFFORT NL, GOOD AE, NO W/R/C  HEART: S1S2+ REG, NO M/G/R  ABD: OBESE, SOFT, NT, NO HSM, BS+  EXT: TRACE EDEMA, NT, PULSES +. JOSE MARTIN'S -VE  NEURO: ALERT AND ORIENTED X 3, NO MENINGEAL SIGNS, NO TREMORS, NL GAIT, NO FOCAL DEFICITS  PSYCH: FAIRLY GOOD AFFECT  BACK: NT, NO ROM, NO CVA TENDERNESS  KNEE: NO SWELLING, + TENDERNESS LT, NO ROM, + MILD CREPITUS - LT > RT      PREVIOUS LABS REVIEWED AND D/W PT       ASSESSMENT / PLAN:     Diagnosis Orders   1. Other hyperlipidemia  COUNSELLED. ADVISED LOW FAT / CHOL DIET/ EXERCISE.  MONITOR. F/U LABS  GOALS D/W PT.  MAKE CHANGES AS NEEDED. 2. Vitamin D deficiency  COUNSELLED. ADVISED MED COMPLIANCE - ADVISED VIT D 1000 U DAILY. MONITOR AND MAKE CHANGES AS NEEDED. 3. Arthritis of knee  COUNSELLED. ? OA. EXERCISES. ANALGESICS PRN. MONITOR. TOPICAL MED PRN - PT STATES HELPING  REFER ORTHO FOR EVAL  MAKE CHANGES AS NEEDED. 4. Obesity (BMI 30-39. 9)  COUNSELLED. DIET/ EXERCISE ADVISED. LIFESTYLE MODIFICATION. WT LOSS ADVISED. LABS TO EVAL. R/O DM, R/O THYROID D/O. MONITOR AND MAKE CHANGES AS NEEDED. 5. Family history of diabetes mellitus (DM)  COUNSELLED. ADVISED ON DIET / EXERCISE  ADVISED RISK FACTOR MODIFICATION. F/U LABS TO EVAL. MONITOR  MAKE CHANGES AS NEEDED. 6. Screening mammogram, encounter for  COUNSELLED. SCREENING MAMMOGRAM ORDERED  MAKE CHANGES AS NEEDED. 7. Screening for condition  COUNSELLED.  LABS TO EVAL - OK WITH PT  MAKE CHANGES AS NEEDED. PT DECLINED SHINGLES VACCINE - READDRESS    Rashaad Riley received counseling on the following healthy behaviors: nutrition, exercise and medication adherence    Patient given educational materials on Hyperlipidemia, Nutrition and Exercise    I have instructed Rashaad Riley to complete a self tracking handout on Weights and instructed them to bring it with them to her next appointment. Discussed use, benefit, and side effects of prescribed medications. Barriers to medication compliance addressed. All patient questions answered. Pt voiced understanding.                MEDICATION SIDE EFFECTS D/W PATIENT    PT STABLE AT TIME OF D/C.    RETURN TO CLINIC WITHIN 3-4 MONTHS / PRN    FOLLOW UP FOR FASTING LABS, ORTHO, MAMMOGRAM

## 2020-02-28 DIAGNOSIS — E78.49 OTHER HYPERLIPIDEMIA: ICD-10-CM

## 2020-02-28 DIAGNOSIS — Z83.3 FAMILY HISTORY OF DIABETES MELLITUS (DM): ICD-10-CM

## 2020-02-28 DIAGNOSIS — E55.9 VITAMIN D DEFICIENCY: ICD-10-CM

## 2020-02-28 DIAGNOSIS — Z13.9 SCREENING FOR CONDITION: ICD-10-CM

## 2020-02-28 LAB
A/G RATIO: 1.9 (ref 1.1–2.2)
ALBUMIN SERPL-MCNC: 4.3 G/DL (ref 3.4–5)
ALP BLD-CCNC: 36 U/L (ref 40–129)
ALT SERPL-CCNC: 25 U/L (ref 10–40)
ANION GAP SERPL CALCULATED.3IONS-SCNC: 11 MMOL/L (ref 3–16)
AST SERPL-CCNC: 22 U/L (ref 15–37)
BASOPHILS ABSOLUTE: 0 K/UL (ref 0–0.2)
BASOPHILS RELATIVE PERCENT: 0.7 %
BILIRUB SERPL-MCNC: 0.4 MG/DL (ref 0–1)
BUN BLDV-MCNC: 15 MG/DL (ref 7–20)
CALCIUM SERPL-MCNC: 9.6 MG/DL (ref 8.3–10.6)
CHLORIDE BLD-SCNC: 103 MMOL/L (ref 99–110)
CHOLESTEROL, TOTAL: 199 MG/DL (ref 0–199)
CO2: 26 MMOL/L (ref 21–32)
CREAT SERPL-MCNC: 0.6 MG/DL (ref 0.6–1.1)
EOSINOPHILS ABSOLUTE: 0.1 K/UL (ref 0–0.6)
EOSINOPHILS RELATIVE PERCENT: 2.3 %
GFR AFRICAN AMERICAN: >60
GFR NON-AFRICAN AMERICAN: >60
GLOBULIN: 2.3 G/DL
GLUCOSE BLD-MCNC: 78 MG/DL (ref 70–99)
HCT VFR BLD CALC: 39.2 % (ref 36–48)
HDLC SERPL-MCNC: 66 MG/DL (ref 40–60)
HEMOGLOBIN: 13.3 G/DL (ref 12–16)
LDL CHOLESTEROL CALCULATED: 123 MG/DL
LYMPHOCYTES ABSOLUTE: 2.1 K/UL (ref 1–5.1)
LYMPHOCYTES RELATIVE PERCENT: 43.4 %
MCH RBC QN AUTO: 30.9 PG (ref 26–34)
MCHC RBC AUTO-ENTMCNC: 33.9 G/DL (ref 31–36)
MCV RBC AUTO: 91.3 FL (ref 80–100)
MONOCYTES ABSOLUTE: 0.4 K/UL (ref 0–1.3)
MONOCYTES RELATIVE PERCENT: 8.1 %
NEUTROPHILS ABSOLUTE: 2.2 K/UL (ref 1.7–7.7)
NEUTROPHILS RELATIVE PERCENT: 45.5 %
PDW BLD-RTO: 13.9 % (ref 12.4–15.4)
PLATELET # BLD: 311 K/UL (ref 135–450)
PMV BLD AUTO: 7.2 FL (ref 5–10.5)
POTASSIUM SERPL-SCNC: 4 MMOL/L (ref 3.5–5.1)
RBC # BLD: 4.29 M/UL (ref 4–5.2)
SODIUM BLD-SCNC: 140 MMOL/L (ref 136–145)
TOTAL PROTEIN: 6.6 G/DL (ref 6.4–8.2)
TRIGL SERPL-MCNC: 51 MG/DL (ref 0–150)
TSH REFLEX: 1.03 UIU/ML (ref 0.27–4.2)
VITAMIN D 25-HYDROXY: 26 NG/ML
VLDLC SERPL CALC-MCNC: 10 MG/DL
WBC # BLD: 4.9 K/UL (ref 4–11)

## 2020-02-29 LAB
ESTIMATED AVERAGE GLUCOSE: 108.3 MG/DL
HBA1C MFR BLD: 5.4 %

## 2020-06-25 ENCOUNTER — HOSPITAL ENCOUNTER (OUTPATIENT)
Age: 55
Discharge: HOME | End: 2020-06-25
Payer: COMMERCIAL

## 2020-06-25 ENCOUNTER — HOSPITAL ENCOUNTER (OUTPATIENT)
Dept: GENERAL RADIOLOGY | Age: 55
Discharge: HOME | End: 2020-06-25
Payer: COMMERCIAL

## 2020-06-25 ENCOUNTER — VIRTUAL VISIT (OUTPATIENT)
Dept: INTERNAL MEDICINE CLINIC | Age: 55
End: 2020-06-25
Payer: COMMERCIAL

## 2020-06-25 PROCEDURE — 73140 X-RAY EXAM OF FINGER(S): CPT

## 2020-06-25 PROCEDURE — 99214 OFFICE O/P EST MOD 30 MIN: CPT | Performed by: INTERNAL MEDICINE

## 2020-06-25 RX ORDER — ACETAMINOPHEN 160 MG
1 TABLET,DISINTEGRATING ORAL DAILY
Qty: 30 CAPSULE | Refills: 3 | Status: SHIPPED | OUTPATIENT
Start: 2020-06-25 | End: 2021-07-27 | Stop reason: SDUPTHER

## 2020-06-25 ASSESSMENT — PATIENT HEALTH QUESTIONNAIRE - PHQ9
2. FEELING DOWN, DEPRESSED OR HOPELESS: 0
SUM OF ALL RESPONSES TO PHQ QUESTIONS 1-9: 0
1. LITTLE INTEREST OR PLEASURE IN DOING THINGS: 0
SUM OF ALL RESPONSES TO PHQ9 QUESTIONS 1 & 2: 0
SUM OF ALL RESPONSES TO PHQ QUESTIONS 1-9: 0

## 2020-06-25 NOTE — PROGRESS NOTES
\"[x]\" Indicates a positive item  \"[]\" Indicates a negative item  -- DELETE ALL ITEMS NOT EXAMINED]  Vital Signs: (As obtained by patient/caregiver or practitioner observation)    Blood pressure-  Heart rate-    Respiratory rate-    Temperature-  Pulse oximetry-   PT UNABLE TO CHECK BP / VITALS    Constitutional: [x] Appears well-developed and well-nourished [x] No apparent distress      [] Abnormal-   Mental status  [x] Alert and awake  [x] Oriented to person/place/time [x]Able to follow commands      Eyes:  EOM    [x]  Normal  [] Abnormal-  Sclera  [x]  Normal  [] Abnormal -         Discharge [x]  None visible  [] Abnormal -    HENT:   [x] Normocephalic, atraumatic. [] Abnormal   [x] Mouth/Throat: Mucous membranes are moist.     External Ears [x] Normal  [] Abnormal-     Neck: [x] No visualized mass     Pulmonary/Chest: [x] Respiratory effort normal.  [x] No visualized signs of difficulty breathing or respiratory distress        [] Abnormal-      Musculoskeletal:   [x] Normal gait with no signs of ataxia         [x] Normal range of motion of neck        [] Abnormal-       Neurological:        [x] No Facial Asymmetry (Cranial nerve 7 motor function) (limited exam to video visit)          [x] No gaze palsy        [] Abnormal-         Skin:        [x] No significant exanthematous lesions or discoloration noted on facial skin         [] Abnormal-            Psychiatric:       [x] Normal Affect [x] No Hallucinations        [] Abnormal-     Other pertinent observable physical exam findings-  HAND: + SWELLING RT 5TH FINGER. + TENDERNESS SAME. + PAIN WITH MVT, + ROM - SAME, NO ERYTHEMA    PREVIOUS LABS  REVIEWED AND D/W PT      ASSESSMENT/PLAN:     Diagnosis Orders   1. Other hyperlipidemia  COUNSELLED. ADVISED LOW FAT / CHOL DIET/ EXERCISE.  MONITOR. GOALS D/W PT.  MAKE CHANGES AS NEEDED. 2. Painful swelling of RT 5th finger  COUNSELLED. S/P FALL. EXERCISES. ANALGESICS PRN. MONITOR.   PT PREFERS TOPICAL MED - BIOMED . ORDERED  X RAY TO EVAL - R/O FX  MAKE CHANGES AS NEEDED. 3. Arthritis of knee  COUNSELLED. Kathye Mash EXERCISES. ANALGESICS PRN. MONITOR. TOPICAL BIOMED COMP ORDERED - SEE CHART  F/U ORTHO  MAKE CHANGES AS NEEDED. 4. Vitamin D deficiency  COUNSELLED. NOT AT GOAL  CHANGE TO VIT D 2000 U DAILY. ADVISED MED COMPLIANCE  MONITOR AND MAKE CHANGES AS NEEDED. 5. Family history of diabetes mellitus (DM)  COUNSELLED. LAB NEGATIVE FOR DM  ADVISED RISK FACTOR MODIFICATION. MONITOR  MAKE CHANGES AS NEEDED. 6. Obesity (BMI 30-39. 9)  COUNSELLED. DIET/ EXERCISE ADVISED. LIFESTYLE MODIFICATION. WT LOSS ADVISED. MONITOR AND MAKE CHANGES AS NEEDED. MEDICATION SIDE EFFECTS D/W PATIENT     PT STABLE AT TIME OF D/C.     RETURN TO CLINIC WITHIN 2- 3 MONTHS / PRN    FOLLOW UP FOR X RAY    Due to this being a TeleHealth encounter (During JFJLQ-40 public health emergency), evaluation of the following organ systems was limited: Vitals/Constitutional/EENT/Resp/CV/GI//MS/Neuro/Skin/Heme-Lymph-Imm. Pursuant to the emergency declaration under the [address] [place] [address], [address] [place] [address] authority and the [employer] and [employer] Act, this Virtual Visit was conducted with patient's (and/or legal guardian's) consent, to reduce the patient's risk of exposure to COVID-19 and provide necessary medical care. The patient (and/or legal guardian) has also been advised to contact this office for worsening conditions or problems, and seek emergency medical treatment and/or call 911 if deemed necessary. Patient identification was verified at the start of the visit: Yes     Services were provided through a video synchronous discussion virtually to substitute for in-person clinic visit. Patient and provider were located at their individual homes.     --Amadou Garcia MD on 6/25/2020 at 12:49 PM    An electronic signature was used to authenticate this note.

## 2020-07-16 ENCOUNTER — OFFICE VISIT (OUTPATIENT)
Dept: ORTHOPEDIC SURGERY | Age: 55
End: 2020-07-16
Payer: COMMERCIAL

## 2020-07-16 VITALS
WEIGHT: 206 LBS | SYSTOLIC BLOOD PRESSURE: 123 MMHG | BODY MASS INDEX: 37.91 KG/M2 | TEMPERATURE: 97.3 F | HEART RATE: 66 BPM | DIASTOLIC BLOOD PRESSURE: 73 MMHG | HEIGHT: 62 IN

## 2020-07-16 PROCEDURE — 99203 OFFICE O/P NEW LOW 30 MIN: CPT | Performed by: ORTHOPAEDIC SURGERY

## 2020-07-16 NOTE — PROGRESS NOTES
Patient Name: Nettie Peace  : 1965  DOS: 2020        Chief Complaint:   Chief Complaint   Patient presents with    Knee Pain     LEFT KNEE       History of Present Illness:  Nettie Peace is a 53 y.o. female who presents with a chief complaint of continued complaints of pain in the knee with irritation with walking, stairs and with overuse. Pain in the knee regularly with swelling and discomfort. Increase in pain over the past several years time. Left sided knee pain with walking and with stiffness. Previous issues with achilles tendinitis. Zohra Brdaford 3 weeks ago with knee pain increase. biomed with some help with control of pain. Medical History  Current Medications:   Prior to Admission medications    Medication Sig Start Date End Date Taking? Authorizing Provider   Cholecalciferol (VITAMIN D3) 50 MCG (2000) CAPS Take 1 capsule by mouth daily 20   Sasha Nelson MD   senna (SENOKOT) 8.6 MG tablet Take 1 tablet by mouth daily    Historical Provider, MD   [address] [place] [id number]    Historical Provider, MD   triamcinolone (KENALOG) 0.025 % cream APPLY TOPICALLY TO RASH TWO TIMES A DAY AS NEEDED 19   Francia Rahman APRN - CNP   Multiple Vitamins-Minerals (THERAPEUTIC MULTIVITAMIN-MINERALS) tablet Take 1 tablet by mouth daily    Historical Provider, MD   Chromium Picolinate 200 MCG CAPS Take by mouth 2 times daily     Historical Provider, MD     Allergies: Allergies   Allergen Reactions    Sulfa Antibiotics Hives       Review of Systems:     Review of Systems ______  Constitutional: Negative for fever and diaphoresis. ____________  Respiratory: Negative for shortness of breath.  ________  Gastrointestinal: Negative for abdominal pain. ________  Musculoskeletal: Positive for joint pain. ____  Skin: Negative for itching. ____  Neurological: Negative for loss of consciousness.  ______________  All other systems reviewed and negative     Past Medical History:   Diagnosis Date    Arthritis     Hyperlipidemia     Vitamin D deficiency      Family History   Problem Relation Age of Onset    High Blood Pressure Mother     Stroke Brother     Diabetes Maternal Grandfather     Diabetes Other         NEPHEW    Heart Disease Other     Cancer Other     Breast Cancer Other      Social History     Socioeconomic History    Marital status:      Spouse name: Not on file    Number of children: 2    Years of education: 15    Highest education level: Not on file   Occupational History    Occupation: Health Unit Coordinator     Employer: [address] [place] [address] [place]: Fulltime   Social Needs    Financial resource strain: Not on file    Food insecurity     Worry: Not on file     Inability: Not on file    Transportation needs     Medical: Not on file     Non-medical: Not on file   Tobacco Use    Smoking status: Never Smoker    Smokeless tobacco: Never Used   Substance and Sexual Activity    Alcohol use: Yes     Comment: Rare    Drug use: No    Sexual activity: Not Currently   Lifestyle    Physical activity     Days per week: Not on file     Minutes per session: Not on file    Stress: Not on file   Relationships    Social connections     Talks on phone: Not on file     Gets together: Not on file     Attends Buddhism service: Not on file     Active member of club or organization: Not on file     Attends meetings of clubs or organizations: Not on file     Relationship status: Not on file    Intimate partner violence     Fear of current or ex partner: Not on file     Emotionally abused: Not on file     Physically abused: Not on file     Forced sexual activity: Not on file   Other Topics Concern    Not on file   Social History Narrative    Live with mother and daughter. Jehovah witness. Physical Exam __  Constitutional: She is oriented to person, place, and time and well-developed, well-nourished, and in no distress. No distress. ____  HENT:   Head: Normocephalic and atraumatic. ____  Eyes: Conjunctivae are normal. ________  Cardiovascular: Intact distal pulses. ____  Pulmonary/Chest: Effort normal. ________________________  Neurological: She is alert and oriented to person, place, and time. ____  Skin: Skin is dry. She is not diaphoretic. ____  Psychiatric: Mood, affect and judgment normal. ______          Assessment   Vital Signs:      Vitals:    07/16/20 1008   BP: 123/73   Pulse: 66   Temp: 97.3 °F (36.3 °C)       left Knee shows evidence for DJD with min obvious pseudolaxity, pain with weight bearing, antalgic gait and palpable osteophytes. Inspection: Moderate anterior swelling. Swelling is present with  min effusion. The posterior aspect of the knee appears to be full with tenderness. There is no erythema, rash, or ecchymosis. Range of Motion:  Right 0-120 left0-100     Pain with medial joint line and bone testing    There is deformity mild varus. Strength:  Hamstrings rated: 4/5. Quadriceps rated: 5/5    Palpation: There is moderate tenderness along the medial  joint line. Special Tests: Patellar Compression test is positive. Valgus & Varus test is positive. Skin: There are no rashes, ulcerations or lesions. Gait: Gait pattern is antalgic  Skin shows no rashes/ecchymosis to the affected area, no hyperesthesias, no discoloration, no temperature or color discrepancies. NEUROLOGICALLY: There is no evidence for sensory or motor deficits in the extremity. Coordination appears full with no spacticity or rigidity. Reflexes appear to be symmetric. Distal circulation intact. No signs of RSD. Additional Comments:hip rom intact. Procedure(s): none    Diagnostic Test Findings:   Xray   Have reviewed the xrays above from 07/16/20   and my impression is ap pa lateral and sunrise. 4 view of the knees with mild osteophytes and patellofemroal joint.        Assessment and Plan:       Diagnosis Orders 1. Chronic pain of left knee  XR KNEE LEFT (MIN 4 VIEWS)              The orders below, if any, were placed during this visit:   Orders Placed This Encounter   Procedures    XR KNEE LEFT (MIN 4 VIEWS)     Standing Status:   Future     Number of Occurrences:   1     Standing Expiration Date:   8/16/2020     Order Specific Question:   Reason for exam:     Answer:   Pain              Treatment Plan:   I discussed the diagnosis of arthritis with the patient. We've discussed treatment options which would include anti-inflammatory medication, physical therapy, bracing, cortisone injections, and Visco supplementation. We have also discussed the benefits of low impact exercise and weight loss. We have also discussed the possibility of joint replacement surgery in the future. Started on diclofenac at this point to see if this does not relieve some of her pain.    Have discussed nutritional supplements as well including tart cherry juice and CBD       Caridad Mike MD

## 2021-06-09 ENCOUNTER — HOSPITAL ENCOUNTER (EMERGENCY)
Age: 56
Discharge: HOME | End: 2021-06-09
Attending: EMERGENCY MEDICINE
Payer: COMMERCIAL

## 2021-06-09 VITALS
RESPIRATION RATE: 18 BRPM | OXYGEN SATURATION: 98 % | TEMPERATURE: 98.2 F | BODY MASS INDEX: 38.74 KG/M2 | DIASTOLIC BLOOD PRESSURE: 78 MMHG | HEART RATE: 85 BPM | SYSTOLIC BLOOD PRESSURE: 133 MMHG | HEIGHT: 62 IN | WEIGHT: 210.5 LBS

## 2021-06-09 DIAGNOSIS — M54.31 SCIATICA OF RIGHT SIDE: Primary | ICD-10-CM

## 2021-06-09 PROCEDURE — 6370000000 HC RX 637 (ALT 250 FOR IP): Performed by: EMERGENCY MEDICINE

## 2021-06-09 PROCEDURE — 99283 EMERGENCY DEPT VISIT LOW MDM: CPT

## 2021-06-09 RX ORDER — NAPROXEN 500 MG/1
500 TABLET ORAL 2 TIMES DAILY
Qty: 20 TABLET | Refills: 0 | Status: SHIPPED | OUTPATIENT
Start: 2021-06-09 | End: 2021-07-27 | Stop reason: SDUPTHER

## 2021-06-09 RX ORDER — OXYCODONE HYDROCHLORIDE AND ACETAMINOPHEN 5; 325 MG/1; MG/1
1-2 TABLET ORAL EVERY 6 HOURS PRN
Qty: 8 TABLET | Refills: 0 | Status: SHIPPED | OUTPATIENT
Start: 2021-06-09 | End: 2021-06-16

## 2021-06-09 RX ORDER — CYCLOBENZAPRINE HCL 10 MG
10 TABLET ORAL 3 TIMES DAILY PRN
Qty: 15 TABLET | Refills: 0 | Status: SHIPPED | OUTPATIENT
Start: 2021-06-09 | End: 2021-06-19

## 2021-06-09 RX ORDER — LIDOCAINE 50 MG/G
1 PATCH TOPICAL DAILY
Qty: 12 PATCH | Refills: 0 | Status: SHIPPED | OUTPATIENT
Start: 2021-06-09

## 2021-06-09 RX ORDER — OXYCODONE HYDROCHLORIDE AND ACETAMINOPHEN 5; 325 MG/1; MG/1
1 TABLET ORAL ONCE
Status: COMPLETED | OUTPATIENT
Start: 2021-06-09 | End: 2021-06-09

## 2021-06-09 RX ADMIN — OXYCODONE HYDROCHLORIDE AND ACETAMINOPHEN 1 TABLET: 5; 325 TABLET ORAL at 22:07

## 2021-06-09 ASSESSMENT — PAIN DESCRIPTION - DESCRIPTORS: DESCRIPTORS: SHARP

## 2021-06-09 ASSESSMENT — PAIN DESCRIPTION - LOCATION: LOCATION: BACK;HIP

## 2021-06-09 ASSESSMENT — PAIN DESCRIPTION - PAIN TYPE: TYPE: CHRONIC PAIN

## 2021-06-09 ASSESSMENT — PAIN SCALES - GENERAL
PAINLEVEL_OUTOF10: 7
PAINLEVEL_OUTOF10: 7

## 2021-06-09 ASSESSMENT — PAIN DESCRIPTION - ORIENTATION: ORIENTATION: RIGHT;LOWER

## 2021-06-09 ASSESSMENT — PAIN DESCRIPTION - FREQUENCY: FREQUENCY: INTERMITTENT

## 2021-06-10 NOTE — ED NOTES
Pt dc/d with instructions and rx's in stable condition, ambulatory to lobby. Home per ride.       Monique Cobos RN  06/09/21 [id number]

## 2021-06-10 NOTE — ED PROVIDER NOTES
[address] [facility]  eMERGENCY dEPARTMENT eNCOUnter      Pt Name: Esdras Lu  MRN: [id number]  [place] 1965  Date of evaluation: 2021  Provider: Karon Lopez MD  PCP: Shawn Trevino MD      [address] [place] [address]       Chief Complaint   Patient presents with    Back Pain     lower, rt hip chronic       HISTORY OFPRESENT ILLNESS   (Location/Symptom, Timing/Onset, Context/Setting, Quality, Duration, Modifying Factors,Severity)  Note limiting factors. Esdras Lu is a 63 y.o. female presents with complaints of back pain she says she has chronic back pain and it seems to be getting worse she denies any new injury she does have pain radiating down into her right hip and down her right leg which is consistent with sciatica that she has had in the past she rates the pain at an 8 out of 10 she denies any fevers or chills she denies any loss of bowel or bladder function she has not seen an orthopedist recently    Nursing Notes were all reviewed and agreed with or any disagreements were addressed  in the HPI. REVIEW OF SYSTEMS    (2-9 systems for level 4, 10 or more for level 5)     Review of Systems    Positives and Pertinent negatives as per HPI. Except as noted above in the ROS, all other systems were reviewed andnegative.        PASTMEDICAL HISTORY     Past Medical History:   Diagnosis Date    Arthritis     Hyperlipidemia     Vitamin D deficiency          SURGICAL HISTORY       Past Surgical History:   Procedure Laterality Date     SECTION      EYE SURGERY Bilateral     EYE SURGERY      FOOT SURGERY Right     MANDIBLE SURGERY      TONSILLECTOMY      TUMOR REMOVAL  1965         CURRENT MEDICATIONS       Previous Medications    CHOLECALCIFEROL (VITAMIN D3) 50 MCG (2000) CAPS    Take 1 capsule by mouth daily    CHROMIUM PICOLINATE 200 MCG CAPS    Take by mouth 2 times daily     DICLOFENAC (VOLTAREN) 50 MG EC TABLET    Take 1 tablet by mouth 2 times daily (with meals)    MULTIPLE VITAMINS-MINERALS (THERAPEUTIC MULTIVITAMIN-MINERALS) TABLET    Take 1 tablet by mouth daily    NONFORMULARY    BIOMED COMPOUND FOR KNEE    SENNA (SENOKOT) 8.6 MG TABLET    Take 1 tablet by mouth daily    TRIAMCINOLONE (KENALOG) 0.025 % CREAM    APPLY TOPICALLY TO RASH TWO TIMES A DAY AS NEEDED       ALLERGIES     Sulfa antibiotics    FAMILY HISTORY       Family History   Problem Relation Age of Onset    High Blood Pressure Mother     Stroke Brother     Diabetes Maternal Grandfather     Diabetes Other         NEPHEW    Heart Disease Other     Cancer Other     Breast Cancer Other           SOCIAL HISTORY       Social History     Socioeconomic History    Marital status:      Spouse name: Not on file    Number of children: 2    Years of education: 15    Highest education level: Not on file   Occupational History    Occupation: Health Unit Coordinator     Employer: [employer]     Comment: Fulltime   Tobacco Use    Smoking status: Never Smoker    Smokeless tobacco: Never Used   Substance and Sexual Activity    Alcohol use: Yes     Comment: Rare    Drug use: No    Sexual activity: Not Currently   Other Topics Concern    Not on file   Social History Narrative    Live with mother and daughter. Jehovah witness. Social Determinants of Health     Financial Resource Strain:     Difficulty of Paying Living Expenses:    Food Insecurity:     Worried About Running Out of Food in the Last Year:     [address] in the Last Year:    Transportation Needs:     Lack of Transportation (Medical):      Lack of Transportation (Non-Medical):    Physical Activity:     Days of Exercise per Week:     Minutes of Exercise per Session:    Stress:     Feeling of Stress :    Social Connections:     Frequency of Communication with Friends and Family:     Frequency of Social Gatherings with Friends and Family:     Attends Church Services:     Active Member of Clubs or Organizations:     Attends Club or Organization Meetings:     Marital Status:    Intimate Partner Violence:     Fear of Current or Ex-Partner:     Emotionally Abused:     Physically Abused:     Sexually Abused:        SCREENINGS      @FLOW([id number])@      PHYSICAL EXAM    (up to 7 for level 4, 8 or more for level 5)     ED Triage Vitals [06/09/21 2150]   BP Temp Temp Source Pulse Resp SpO2 Height Weight   133/78 98.2 °F (36.8 °C) Oral 85 18 98 % 5' 2\" (1.575 m) 210 lb 8 oz (95.5 kg)       Physical Exam      General Appearance:  Alert, cooperative, no distress, appears stated age. Head:  Normocephalic, without obviousabnormality, atraumatic. Eyes:  conjunctiva/corneas clear, EOM's intact. Sclera anicteric. ENT: Mucous membranes moist.   Neck: Supple, symmetrical, trachea midline, no adenopathy. No jugular venous distention. Lungs:   Clear to auscultation bilaterally, respirationsunlabored. No rales, rhonchi or wheezes. Chest Wall:  No tenderness. Heart:  Regular rate and rhythm, S1 and S2 normal, no murmur, rub or gallop. Abdomen:   Soft, non-tender, bowel sounds active,   no masses, no organomegaly. Extremities: No edema, cords or calf tenderness. Full range of motion. Negative straight leg raise   Pulses: 2+ and symmetric   Skin: Turgor is normal, no rashes or lesions. Neurologic: Alert and oriented X 3. No focal findings. Motor grossly normal.  Speech clear, no drift, CN III-XII grossly intact,        DIAGNOSTIC RESULTS   LABS:    Labs Reviewed - No data to display    All other labs were within normal range or not returned as of this dictation. EKG: All EKG's are interpreted by the Emergency Department Physician who eithersigns or Co-signs this chart in the absence of a cardiologist.        RADIOLOGY:   Non-plain film images such as CT, Ultrasound and MRI are read by the radiologist. Plain radiographic images are visualized by myself.       *    Interpretation per the Radiologist below, if available at the time of this note:    No orders to display         PROCEDURES   Unless otherwise noted below, none     Procedures    *    CRITICAL CARE TIME   N/A      EMERGENCY DEPARTMENT COURSE and DIFFERENTIALDIAGNOSIS/MDM:   Vitals:    Vitals:    06/09/21 2150   BP: 133/78   Pulse: 85   Resp: 18   Temp: 98.2 °F (36.8 °C)   TempSrc: Oral   SpO2: 98%   Weight: 210 lb 8 oz (95.5 kg)   Height: 5' 2\" (1.575 m)       Patient was given thefollowing medications:  Medications   oxyCODONE-acetaminophen (PERCOCET) 5-325 MG per tablet 1 tablet (has no administration in time range)           The patient tolerated their visit well. The patient and / or the familywere informed of the results of any tests, a time was given to answer questions. FINAL IMPRESSION      1. Sciatica of right side          DISPOSITION/PLAN   DISPOSITION Discharge - Pending Orders Complete 06/09/2021 10:03:21 PM  I did not feel the need for any new imaging plan will be for discharge with pain medication and follow-up with orthopedics    PATIENT REFERRED TO:  Ranjan Watson MD  [address] [place] [address]  [phone]    In 1 week        DISCHARGE MEDICATIONS:  New Prescriptions    CYCLOBENZAPRINE (FLEXERIL) 10 MG TABLET    Take 1 tablet by mouth 3 times daily as needed for Muscle spasms    LIDOCAINE (LIDODERM) 5 %    Place 1 patch onto the skin daily 12 hours on, 12 hours off. NAPROXEN (NAPROSYN) 500 MG TABLET    Take 1 tablet by mouth 2 times daily for 20 doses    OXYCODONE-ACETAMINOPHEN (PERCOCET) 5-325 MG PER TABLET    Take 1-2 tablets by mouth every 6 hours as needed for Pain for up to 7 days.        DISCONTINUED MEDICATIONS:  Discontinued Medications    No medications on file              (Please note that portions of this note were completed with a voice recognition program.  Efforts were made to edit the dictations but occasionally words are mis-transcribed.)    Jhonny Beverly MD (electronically signed)      Saumya Fletcher MD  06/09/21 [phone]

## 2021-06-15 ENCOUNTER — OFFICE VISIT (OUTPATIENT)
Dept: ORTHOPEDIC SURGERY | Age: 56
End: 2021-06-15
Payer: COMMERCIAL

## 2021-06-15 VITALS — BODY MASS INDEX: 38.64 KG/M2 | HEIGHT: 62 IN | WEIGHT: 210 LBS

## 2021-06-15 DIAGNOSIS — M54.16 LUMBAR RADICULOPATHY: Primary | ICD-10-CM

## 2021-06-15 DIAGNOSIS — M43.16 SPONDYLOLISTHESIS, LUMBAR REGION: ICD-10-CM

## 2021-06-15 DIAGNOSIS — M47.816 SPONDYLOSIS OF LUMBAR REGION WITHOUT MYELOPATHY OR RADICULOPATHY: ICD-10-CM

## 2021-06-15 DIAGNOSIS — M54.50 LUMBAR PAIN: ICD-10-CM

## 2021-06-15 PROCEDURE — 99204 OFFICE O/P NEW MOD 45 MIN: CPT | Performed by: STUDENT IN AN ORGANIZED HEALTH CARE EDUCATION/TRAINING PROGRAM

## 2021-06-15 RX ORDER — PREDNISONE 10 MG/1
10 TABLET ORAL DAILY
Qty: 26 TABLET | Refills: 0 | Status: SHIPPED | OUTPATIENT
Start: 2021-06-15 | End: 2021-08-24 | Stop reason: ALTCHOICE

## 2021-06-15 NOTE — PROGRESS NOTES
New Patient: LUMBAR SPINE    Referring Provider:  No ref. provider found    CHIEF COMPLAINT:    Chief Complaint   Patient presents with    Lower Back Pain     NP LSP, 2021 pain onset. Patient notes she has had back pain prior. Patient recently started going back to gym, but she is unable to determine what has cause the new onset. She reports it has progressively worsened over the week. Numbness tingling in the right posterior thigh        HISTORY OF PRESENT ILLNESS:    Ms. Matthew العراقي  is a pleasant 63 y.o. female here for consultation regarding her LBP and right leg pain. She states her pain began on 21 and worsened on 21 after bending over to pull on a sock. Her pain has remained unchanged since then. She has a history of sciatica in the past. She rates her back pain 7/10, buttock pain 6/10, and leg pain 2/10. She describes the pain as throbbing and occasionally shooting that is worse with sitting, changing positions, lying down and better with standing, leaning forward, walking . The leg pain radiates down the lateral aspect of her leg to her knee. She endorses mild numbness and tingling in her right leg. She denies weakness of her right leg and denies bowel or bladder dysfunction. She states she can sit for a maximum of 10 minutes and stand for a maximum 1 hour. The pain mildly disrupts her sleep.      Current/Past Treatment:   · Physical Therapy: None  · Chiropractic:  None   · Injection:  None   · Medications:    · NSAIDS: Naproxyn; mild relief   · Muscle relaxer:  Flexeril; moderat relief  · Steriods:  NONE  · Neuropathic medications:  NONE  · Opioids: Percocet; moderate relief  · Other: NONE  ·  Surgery/Consult NONE    Past Medical History:   Past Medical History:   Diagnosis Date    Arthritis     Hyperlipidemia     Vitamin D deficiency       Past Surgical History:     Past Surgical History:   Procedure Laterality Date     SECTION  1991    EYE SURGERY Bilateral 1975    EYE Cancer Other     Breast Cancer Other        REVIEW OF SYSTEMS: Full ROS noted & scanned   CONSTITUTIONAL: Denies unexplained weight loss, fevers, chills or fatigue  NEUROLOGICAL: Denies unsteady gait or progressive weakness  MUSCULOSKELETAL: Denies joint swelling or redness  PSYCHOLOGICAL: Denies anxiety, depression   SKIN: Denies skin changes, delayed healing, rash, itching   HEMATOLOGIC: Denies easy bleeding or bruising  ENDOCRINE: Denies excessive thirst, urination, heat/cold  RESPIRATORY: Denies current dyspnea, cough  GI: Denies nausea, vomiting, diarrhea   : Denies bowel or bladder issues      PHYSICAL EXAM:    Vitals: Height 5' 2\" (1.575 m), weight 210 lb (95.3 kg), not currently breastfeeding. GENERAL EXAM:  · General Apparence: Patient is adequately groomed with no evidence of malnutrition. · Orientation: The patient is oriented to time, place and person. · Mood & Affect:The patient's mood and affect are appropriate. · Vascular: Examination reveals no swelling tenderness in upper or lower extremities. Good capillary refill. · Lymphatic: The lymphatic examination bilaterally reveals all areas to be without enlargement or induration  · Sensation: Sensation is diminished to light touch right L5 distribution   · Coordination/Balance: Good coordination    · RIGHT UPPER EXTREMITY:  Inspection/examination of the right upper extremity does not show any tenderness, deformity or injury. Range of motion is full. There is no gross instability. There are no rashes, ulcerations or lesions. Strength and tone are normal.  · LEFT UPPER EXTREMITY: Inspection/examination of the left upper extremity does not show any tenderness, deformity or injury. Range of motion is full. There is no gross instability. There are no rashes, ulcerations or lesions. Strength and tone are normal.    LUMBAR/SACRAL EXAMINATION:  · Inspection: Local inspection shows no step-off or bruising.   Lumbar alignment is normal.  Sagittal and

## 2021-06-21 ENCOUNTER — HOSPITAL ENCOUNTER (OUTPATIENT)
Dept: PHYSICAL THERAPY | Age: 56
Setting detail: THERAPIES SERIES
Discharge: HOME | End: 2021-06-21
Payer: COMMERCIAL

## 2021-06-21 PROCEDURE — 97140 MANUAL THERAPY 1/> REGIONS: CPT | Performed by: PHYSICAL THERAPIST

## 2021-06-21 PROCEDURE — 97161 PT EVAL LOW COMPLEX 20 MIN: CPT | Performed by: PHYSICAL THERAPIST

## 2021-06-21 PROCEDURE — 97110 THERAPEUTIC EXERCISES: CPT | Performed by: PHYSICAL THERAPIST

## 2021-06-21 NOTE — PLAN OF CARE
The [facility] and [facility], [address], [address] [facility]    Physical Therapy Certification    Dear Referring Practitioner: Nataliia Goodwin,    We had the pleasure of evaluating the following patient for physical therapy services at [address] [place] [address]. A summary of our findings can be found in the initial assessment below. This includes our plan of care. If you have any questions or concerns regarding these findings, please do not hesitate to contact me at the office phone number checked above. Thank you for the referral.       Physician Signature:_______________________________Date:__________________  By signing above (or electronic signature), therapists plan is approved by physician    Patient: Tosin Kohler   : 1965   MRN: [id number]  Referring Physician: Referring Practitioner: VAZQUEZ Goodwin      Evaluation Date: 2021      Medical Diagnosis Information:  Diagnosis: M54.16 (ICD-10-CM) - Lumbar radiculopathy, M43.16 (ICD-10-CM) - Spondylolisthesis, lumbar region, M47.816 (ICD-10-CM) - Spondylosis of lumbar region without myelopathy or radiculopathy   Treatment Diagnosis: M54.16 (ICD-10-CM) - Lumbar radiculopathy                                         Insurance information: PT Insurance Information: LOBO Mitchell w/ [employer] auth, $40 cp       Precautions/ Contra-indications: none    C-SSRS Triggered by Intake questionnaire (Past 2 wk assessment):   [x] No, Questionnaire did not trigger screening.   [] Yes, Patient intake triggered further evaluation      [] C-SSRS Screening completed  [] PCP notified via Plan of Care  [] Emergency services notified     Latex Allergy:  [x]NO      []YES  Preferred Language for Healthcare:   [x]English       []other:    SUBJECTIVE: Patient stated complaint:Pt reports onset of LBP on 21 with worsening on 21 when she bent over to put on a sock.   She was initially seen in the ED and then followed up with VAZQUEZ Stratton. She was placed on a steroid pack and given Naproxen to use following. She had been off work and finally went back for the first time yesterday.      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, 8 hour shifts 4x/week    Living Status/Prior Level of Function: Independent with ADLs and IADLs, walking, [employer] workouts--classes and some machines, helps with caring for grand children    OBJECTIVE:     ROM     LUMBAR FLEX 65* pain w/ return    LUMBAR EXT 30*    Sidebend 15* 15*   Rotation      LEFT RIGHT   HIP Flex WFL WFL   HIP Abd     HIP ER WFL WFL   HIP IR Min decr ~10*   Knee Flex     Knee ext     Hamstring FLEX WFL WFL   Piriformis   ++             Strength  LEFT RIGHT   MfA     TrA fair    HIP Flexors 4- 4-   HIP Abductors     HIP ER     Hip IR     Knee EXT (quad) 4+ 5   Knee Flex (HS) 4+ 5   Ankle DF 5 5   Ankle PF     Great Toe Ext       Reflexes/Sensation:    [x]Dermatomes/Myotomes intact    [x]UE Reflexes     [x]Normal []Hypo      []Hyper   [x]LE Reflexes     [x]Normal []Hypo      []Hyper   []Clonus/Hoffmans:    []Other:     Joint mobility: tenderness L4-5 SP and sacral distratcion   []Normal    []Hypo   []Hyper    Palpation: tenderness and increased tone R>L PSM T10-L5 and R glute and piriformis    Functional Mobility/Transfers: indep with increased pain with supine <-> sit    Posture: increased lumbar lordosis, R iliac crest slightly high in standing, iliac crests/PSIS level in sitting    Bandages/Dressings/Incisions: NA    Gait: (include devices/WB status) NT    Orthopedic Special Tests: (-) slump, SLR, crossed SLR, + trendelenburg in R>L SLS                       [x] Patient history, allergies, meds reviewed. Medical chart reviewed. See intake form. Review Of Systems (ROS):  [x]Performed Review of systems (Integumentary, CardioPulmonary, Neurological) by intake and observation. Intake form has been scanned into medical record. Patient has been instructed to contact their primary care physician regarding ROS issues if not already being addressed at this time. Co-morbidities/Complexities (which will affect course of rehabilitation):   []None           Arthritic conditions   []Rheumatoid arthritis (M05.9)  [x]Osteoarthritis (M19.91)   Cardiovascular conditions   []Hypertension (I10)  [x]Hyperlipidemia (E78.5)  []Angina pectoris (I20)  []Atherosclerosis (I70)   Musculoskeletal conditions   []Disc pathology   []Congenital spine pathologies   []Prior surgical intervention  []Osteoporosis (M81.8)  []Osteopenia (M85.8)   Endocrine conditions   []Hypothyroid (E03.9)  []Hyperthyroid Gastrointestinal conditions   []Constipation (Y46.75)   Metabolic conditions   []Morbid obesity (E66.01)  []Diabetes type 1(E10.65) or 2 (E11.65)   []Neuropathy (G60.9)     Pulmonary conditions   []Asthma (J45)  []Coughing   []COPD (J44.9)   Psychological Disorders  []Anxiety (F41.9)  []Depression (F32.9)   []Other:   []Other:          Barriers to/and or personal factors that will affect rehab potential:              []Age  []Sex              []Motivation/Lack of Motivation                        []Co-Morbidities              []Cognitive Function, education/learning barriers              []Environmental, home barriers              []profession/work barriers  []past PT/medical experience  []other:  Justification:     Falls Risk Assessment (30 days):   [x] Falls Risk assessed and no intervention required.   [] Falls Risk assessed and Patient requires intervention due to being higher risk   TUG score (>12s at risk):     [] Falls education provided, including       G-Codes:       ASSESSMENT:   Functional Impairments:     []Noted lumbar/proximal hip hypomobility   []Noted lumbosacral and/or generalized hypermobility   [x]Decreased Lumbosacral/hip/LE functional ROM   [x]Decreased core/proximal hip strength and neuromuscular control    [x]Decreased LE functional strength    []Abnormal reflexes/sensation/myotomal/dermatomal deficits  [x]Reduced balance/proprioceptive control    []other:      Functional Activity Limitations (from functional questionnaire and intake)   [x]Reduced ability to tolerate prolonged functional positions   [x]Reduced ability or difficulty with changes of positions or transfers between positions   [x]Reduced ability to maintain good posture and demonstrate good body mechanics with sitting, bending, and lifting   [x]Reduced ability to sleep   [] Reduced ability or tolerance with driving and/or computer work   [x]Reduced ability to perform lifting, reaching, carrying tasks   [x]Reduced ability to squat   [x]Reduced ability to forward bend   [x]Reduced ability to ambulate prolonged functional periods/distances/surfaces   [x]Reduced ability to ascend/descend stairs   []other:       Participation Restrictions   [x]Reduced participation in self care activities   [x]Reduced participation in home management activities   [x]Reduced participation in work activities   [x]Reduced participation in social activities. []Reduced participation in sport/recreation activities. Classification:   [x]Signs/symptoms consistent with Lumbar instability/stabilization subgroup. [x]Signs/symptoms consistent with Lumbar mobilization/manipulation subgroup, myotomes and dermatomes intact. Meets manipulation criteria.     []Signs/symptoms consistent with Lumbar direction specific/centralization subgroup   []Signs/symptoms consistent with Lumbar traction subgroup     []Signs/symptoms consistent with lumbar facet dysfunction   []Signs/symptoms consistent with lumbar stenosis type dysfunction   []Signs/symptoms consistent with nerve root involvement including myotome & dermatome dysfunction   []Signs/symptoms consistent with post-surgical status including: decreased ROM, strength and function. []signs/symptoms consistent with pathology which may benefit from Dry needling     []other:      Prognosis/Rehab Potential:      []Excellent   [x]Good    []Fair   []Poor    Tolerance of evaluation/treatment:    []Excellent   [x]Good    []Fair   []Poor  Physical Therapy Evaluation Complexity Justification  [x] A history of present problem with:  [] no personal factors and/or comorbidities that impact the plan of care;  [x]1-2 personal factors and/or comorbidities that impact the plan of care  []3 personal factors and/or comorbidities that impact the plan of care  [x] An examination of body systems using standardized tests and measures addressing any of the following: body structures and functions (impairments), activity limitations, and/or participation restrictions;:  [x] a total of 1-2 or more elements   [] a total of 3 or more elements   [] a total of 4 or more elements   [x] A clinical presentation with:  [x] stable and/or uncomplicated characteristics   [] evolving clinical presentation with changing characteristics  [] unstable and unpredictable characteristics;   [x] Clinical decision making of [x] low, [] moderate, [] high complexity using standardized patient assessment instrument and/or measurable assessment of functional outcome.     [x] EVAL (LOW) 11114 (typically 20 minutes face-to-face)  [] EVAL (MOD) 07469 (typically 30 minutes face-to-face)  [] EVAL (HIGH) 98052 (typically 45 minutes face-to-face)  [] RE-EVAL     PLAN: Begin PT focusing on: proximal hip mobilizations, LB mobs, LB core activation, proximal hip activation, and HEP    Frequency/Duration:  1-2 days per week for 8 Weeks:  Interventions:  [x]  Therapeutic exercise including: strength training, ROM, for LE, Glutes and core   [x]  NMR activation and proprioception for glutes , LE and Core   [x]  Manual therapy as indicated for Hip complex, LE and spine to include: Dry Needling/IASTM, STM, PROM, Gr I-IV mobilizations, manipulation. [x]  Modalities as needed that may include: thermal agents, E-stim, Biofeedback, US, iontophoresis as indicated  [x]  Patient education on joint protection, postural re-education, activity modification, progression of HEP. HEP instruction: (see below)    GOALS:  Patient stated goal: able to resume exercising without pain  [] Progressing: [] Met: [] Not Met: [] Adjusted    Therapist goals for Patient:   Short Term Goals: To be achieved in: 2 weeks  1. Independent in HEP and progression per patient tolerance, in order to prevent re-injury. [] Progressing: [] Met: [] Not Met: [] Adjusted  2. Patient will have a decrease in pain to facilitate improvement in movement, function, and ADLs as indicated by Functional Deficits. [] Progressing: [] Met: [] Not Met: [] Adjusted      Long Term Goals: To be achieved in: 8 weeks  1. Disability index score of 15% or less for the modODI  to assist with reaching prior level of function. [] Progressing: [] Met: [] Not Met: [] Adjusted  2. Patient will demonstrate increased AROM to WNL, good LS mobility, good hip ROM to allow for proper joint functioning as indicated by patients Functional Deficits. [] Progressing: [] Met: [] Not Met: [] Adjusted  3. Patient will demonstrate an increase in Strength to good proximal hip and core activation to allow for proper functional mobility as indicated by patients Functional Deficits. [] Progressing: [] Met: [] Not Met: [] Adjusted  4. Patient will return to lifting 20# floor to waist without increased symptoms or restriction. [] Progressing: [] Met: [] Not Met: [] Adjusted  5.  Pt will be indep with return to safe gym routine for healthy lifestyle. [] Progressing: [] Met: [] Not Met: [] Adjusted       Electronically signed by:  Mildred Aguila PT    Access Code: [email or web address]  URL: [email or web address]  Date: 06/21/2021  Prepared by: Mildred Aguila    Exercises  Sidelying Thoracic Rotation with Open Book - 1-2 x daily - 7 x weekly - 1 sets - 5-10 reps - 5-10 seconds hold  Clamshell - 1-2 x daily - 7 x weekly - 2-3 sets - 10 reps  Supine Lower Trunk Rotation - 1-2 x daily - 7 x weekly - 1-2 sets - 10 reps - 5 seconds hold  Supine Bridge with Spinal Articulation - 1-2 x daily - 7 x weekly - 1-3 sets - 5-10 reps

## 2021-06-21 NOTE — FLOWSHEET NOTE
The [facility] and [address] [facility], [address] [facility] [place], [place]    Physical Therapy Treatment Note/ Progress Report:           Date:  2021    Patient Name:  Verónica Rae    :  1965  MRN: [id number]  Restrictions/Precautions:    Medical/Treatment Diagnosis Information:  · Diagnosis: M54.16 (ICD-10-CM) - Lumbar radiculopathy, M43.16 (ICD-10-CM) - Spondylolisthesis, lumbar region, M47.816 (ICD-10-CM) - Spondylosis of lumbar region without myelopathy or radiculopathy  · Treatment Diagnosis: M54.16 (ICD-10-CM) - Lumbar radiculopathy  Insurance/Certification information:  PT Insurance Information: L[employer] w/ [employer] auth, $40 cp  Physician Information:  Referring Practitioner: Alvester Cabot, PA  Has the plan of care been signed (Y/N):        []  Yes  [x]  No     Date of Patient follow up with Physician: none scheduled 21      Is this a Progress Report:     []  Yes  [x]  No        If Yes:  Date Range for reporting period:  Beginnin21  Ending:     Progress report will be due (10 Rx or 30 days whichever is less):        Recertification will be due (POC Duration  / 90 days whichever is less): 8 weeks        Visit # Insurance Allowable Auth Required   In-person 1 [employer] auth []  Yes []  No    Telehealth   []  Yes []  No    Total            Functional Scale: modODI 30%    Date assessed:  21      Therapy Diagnosis/Practice Pattern:F, conservative      Number of Comorbidities:  []0     [x]1-2    []3+    Latex Allergy:  [x]NO      []YES  Preferred Language for Healthcare:   [x]English       []other:      Pain level: eval 4/10    SUBJECTIVE:  See eval    OBJECTIVE: See eval   Observation:    Test measurements:      RESTRICTIONS/PRECAUTIONS: none    Exercises/Interventions:     Therapeutic Ex (66246) Sets/sec/reps Notes/CUES   SL open book S w/ top elbow bent 5\" x10 HEP   SL clam w/ TA x15 R/L HEP   Supine LTR x10 HEP   Supine bridge w/ spinal articulation x10 HEP                                      Pt ed: eval findings, POC, HEP, activity mod, TA and lifting mechanics, log roll for supine<->sit x10'    Manual Intervention (41760)     STM R>L PSM and post hip  SL sacral distraction x12'  2x30\"                             NMR re-education (08597)  CUES NEEDED                                                Therapeutic Activity (79727)                                       Therapeutic Exercise and NMR EXR  [x] (61176) Provided verbal/tactile cueing for activities related to strengthening, flexibility, endurance, ROM  for improvements in proximal hip and core control with self care, mobility, lifting and ambulation. [x] (37445) Provided verbal/tactile cueing for activities related to improving balance, coordination, kinesthetic sense, posture, motor skill, proprioception  to assist with core control in self care, mobility, lifting, and ambulation.      Therapeutic Activities:    [] (64751 or 79475) Provided verbal/tactile cueing for activities related to improving balance, coordination, kinesthetic sense, posture, motor skill, proprioception and motor activation to allow for proper function  with self care and ADLs  [] (55661) Provided training and instruction to the patient for proper core and proximal hip recruitment and positioning with ambulation re-education     Home Exercise Program:    [x] (85900) Reviewed/Progressed HEP activities related to strengthening, flexibility, endurance, ROM of core, proximal hip and LE for functional self-care, mobility, lifting and ambulation   [] (12715) Reviewed/Progressed HEP activities related to improving balance, coordination, kinesthetic sense, posture, motor skill, proprioception of core, proximal hip and LE for self care, mobility, lifting, and ambulation      Manual Treatments:  PROM / STM / Oscillations-Mobs:  G-I, II, III, IV (PA's, Inf., Post.)  [x] (62928) Provided manual therapy to mobilize proximal hip and LS spine soft tissue/joints for the purpose of modulating pain, promoting relaxation,  increasing ROM, reducing/eliminating soft tissue swelling/inflammation/restriction, improving soft tissue extensibility and allowing for proper ROM for normal function with self care, mobility, lifting and ambulation. Modalities:   MHP in long sit x10'    Charges  Timed Code Treatment Minutes: 30'   Total Treatment Minutes: 61'     [x] EVAL (LOW) 01482   [] EVAL (MOD) 22004   [] EVAL (HIGH) 52832   [] RE-EVAL     [x] HM(21717) x 1    [] IONTO  [] NMR (20158) x     [] VASO  [x] Manual (32528) x 1     [] Other:  [] TA x      [] Mech Traction (60020)  [] ES(attended) (31105)      [] ES (un) (38566):     Goals:   Patient stated goal: able to resume exercising without pain  [] Progressing: [] Met: [] Not Met: [] Adjusted    Therapist goals for Patient:   Short Term Goals: To be achieved in: 2 weeks  1. Independent in HEP and progression per patient tolerance, in order to prevent re-injury. [] Progressing: [] Met: [] Not Met: [] Adjusted  2. Patient will have a decrease in pain to facilitate improvement in movement, function, and ADLs as indicated by Functional Deficits. [] Progressing: [] Met: [] Not Met: [] Adjusted      Long Term Goals: To be achieved in: 8 weeks  1. Disability index score of 15% or less for the modODI  to assist with reaching prior level of function. [] Progressing: [] Met: [] Not Met: [] Adjusted  2. Patient will demonstrate increased AROM to WNL, good LS mobility, good hip ROM to allow for proper joint functioning as indicated by patients Functional Deficits. [] Progressing: [] Met: [] Not Met: [] Adjusted  3. Patient will demonstrate an increase in Strength to good proximal hip and core activation to allow for proper functional mobility as indicated by patients Functional Deficits. [] Progressing: [] Met: [] Not Met: [] Adjusted  4.  Patient will return to lifting 20# floor to waist without increased symptoms or restriction. [] Progressing: [] Met: [] Not Met: [] Adjusted  5. Pt will be indep with return to safe gym routine for healthy lifestyle. [] Progressing: [] Met: [] Not Met: [] Adjusted     Progression Towards Functional goals:  [] Patient is progressing as expected towards functional goals listed. [] Progression is slowed due to complexities listed. [] Progression has been slowed due to co-morbidities. [x] Plan just implemented, too soon to assess goals progression  [] Other:     Overall Progression Towards Functional goals/ Treatment Progress Update:  [] Patient is progressing as expected towards functional goals listed. [] Progression is slowed due to complexities/Impairments listed. [] Progression has been slowed due to co-morbidities.   [x] Plan just implemented, too soon to assess goals progression <30days   [] Goals require adjustment due to lack of progress  [] Patient is not progressing as expected and requires additional follow up with physician  [] Other    Prognosis for POC: [x] Good [] Fair  [] Poor      Patient requires continued skilled intervention: [x] Yes  [] No    Treatment/Activity Tolerance:  [x] Patient able to complete treatment  [] Patient limited by fatigue  [] Patient limited by pain    [] Patient limited by other medical complications  [] Other:     ASSESSMENT: see eval    Return to Play: (if applicable)   []  Stage 1: Intro to Strength   []  Stage 2: Return to Run and Strength   []  Stage 3: Return to Jump and Strength   []  Stage 4: Dynamic Strength and Agility   []  Stage 5: Sport Specific Training     []  Ready to Return to Play, Meets All Above Stages   []  Not Ready for Return to Sports   Comments:                         PLAN: See eval  [] Continue per plan of care [] Alter current plan (see comments above)  [x] Plan of care initiated [] Hold pending MD visit [] Discharge      Electronically signed by:  Dania Fofana PT    Note: If patient does not return for scheduled/ recommended follow up visits, this note will serve as a discharge from care along with most recent update on progress. Access Code: [email or web address]  URL: [email or web address]  Date: 06/21/2021  Prepared by: Jasvir Rolon     Exercises  Sidelying Thoracic Rotation with Open Book - 1-2 x daily - 7 x weekly - 1 sets - 5-10 reps - 5-10 seconds hold  Clamshell - 1-2 x daily - 7 x weekly - 2-3 sets - 10 reps  Supine Lower Trunk Rotation - 1-2 x daily - 7 x weekly - 1-2 sets - 10 reps - 5 seconds hold  Supine Bridge with Spinal Articulation - 1-2 x daily - 7 x weekly - 1-3 sets - 5-10 reps

## 2021-06-28 ENCOUNTER — HOSPITAL ENCOUNTER (OUTPATIENT)
Dept: PHYSICAL THERAPY | Age: 56
Setting detail: THERAPIES SERIES
Discharge: HOME | End: 2021-06-28
Payer: COMMERCIAL

## 2021-06-28 PROCEDURE — 97140 MANUAL THERAPY 1/> REGIONS: CPT | Performed by: PHYSICAL THERAPIST

## 2021-06-28 PROCEDURE — 97110 THERAPEUTIC EXERCISES: CPT | Performed by: PHYSICAL THERAPIST

## 2021-06-28 NOTE — FLOWSHEET NOTE
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  · Treatment Diagnosis: M54.16 (ICD-10-CM) - Lumbar radiculopathy  Insurance/Certification information:  PT Insurance Information: L[employer] w/ [employer] auth, $40 cp  Physician Information:  Referring Practitioner: VAZQUEZ Yi  Has the plan of care been signed (Y/N):        [x]  Yes  []  No     Date of Patient follow up with Physician: none scheduled 21      Is this a Progress Report:     []  Yes  [x]  No        If Yes:  Date Range for reporting period:  Beginnin21  Ending:     Progress report will be due (10 Rx or 30 days whichever is less): 57       Recertification will be due (POC Duration  / 90 days whichever is less): 8 weeks        Visit # Insurance Allowable Auth Required   In-person 2 Quantum auth []  Yes []  No    Telehealth   []  Yes []  No    Total            Functional Scale: modODI 30%    Date assessed:  21      Therapy Diagnosis/Practice Pattern:F, conservative      Number of Comorbidities:  []0     [x]1-2    []3+    Latex Allergy:  [x]NO      []YES  Preferred Language for Healthcare:   [x]English       []other:      Pain level: eval 4/10   Current pain 4/10    SUBJECTIVE:  Pt reports HEP is going well and denies any questions or problems. She states she finished the steroids and has gone back to the Naproxen. She worked this weekend and has been very uncomfortable in her central lower back, and lesser so in the R post hip.   She was using her functional self-care, mobility, lifting and ambulation   [] (05334) Reviewed/Progressed HEP activities related to improving balance, coordination, kinesthetic sense, posture, motor skill, proprioception of core, proximal hip and LE for self care, mobility, lifting, and ambulation      Manual Treatments:  PROM / STM / Oscillations-Mobs:  G-I, II, III, IV (PA's, Inf., Post.)  [x] (83151) Provided manual therapy to mobilize proximal hip and LS spine soft tissue/joints for the purpose of modulating pain, promoting relaxation,  increasing ROM, reducing/eliminating soft tissue swelling/inflammation/restriction, improving soft tissue extensibility and allowing for proper ROM for normal function with self care, mobility, lifting and ambulation. Modalities:  '    Charges  Timed Code Treatment Minutes: 40'   Total Treatment Minutes: 36'     [] EVAL (LOW) 21818   [] EVAL (MOD) 35554   [] EVAL (HIGH) 40407   [] RE-EVAL     [x] FR(90343) x 2    [] IONTO  [] NMR (84805) x     [] VASO  [x] Manual (62314) x 1     [] Other:  [] TA x      [] Mech Traction (88209)  [] ES(attended) (74381)      [] ES (un) (75175):     Goals:   Patient stated goal: able to resume exercising without pain  [] Progressing: [] Met: [] Not Met: [] Adjusted    Therapist goals for Patient:   Short Term Goals: To be achieved in: 2 weeks  1. Independent in HEP and progression per patient tolerance, in order to prevent re-injury. [] Progressing: [] Met: [] Not Met: [] Adjusted  2. Patient will have a decrease in pain to facilitate improvement in movement, function, and ADLs as indicated by Functional Deficits. [] Progressing: [] Met: [] Not Met: [] Adjusted      Long Term Goals: To be achieved in: 8 weeks  1. Disability index score of 15% or less for the modODI  to assist with reaching prior level of function. [] Progressing: [] Met: [] Not Met: [] Adjusted  2.  Patient will demonstrate increased AROM to WNL, good LS mobility, good hip ROM to allow for 1: Intro to Strength   []  Stage 2: Return to Run and Strength   []  Stage 3: Return to Jump and Strength   []  Stage 4: Dynamic Strength and Agility   []  Stage 5: Sport Specific Training     []  Ready to Return to Play, Meets All Above Stages   []  Not Ready for Return to Sports   Comments:                         PLAN: See eval  [x] Continue per plan of care [] Alter current plan (see comments above)  [] Plan of care initiated [] Hold pending MD visit [] Discharge      Electronically signed by:  Dania Fofana PT    Note: If patient does not return for scheduled/ recommended follow up visits, this note will serve as a discharge from care along with most recent update on progress. Access Code: [email or web address]  URL: [email or web address]  Date: 06/28/2021  Prepared by: Dania Fofana    Exercises  Sidelying Thoracic Rotation with Open Book - 1-2 x daily - 7 x weekly - 1 sets - 5-10 reps - 5-10 seconds hold  Clamshell - 1-2 x daily - 7 x weekly - 2-3 sets - 10 reps  Supine Lower Trunk Rotation - 1-2 x daily - 7 x weekly - 1-2 sets - 10 reps - 5 seconds hold  Supine Bridge with Spinal Articulation - 1-2 x daily - 7 x weekly - 1-3 sets - 5-10 reps  Supine Active Straight Leg Raise - 1 x daily - 7 x weekly - 2-3 sets - 5-10 reps  Supine Piriformis Stretch with Foot on Ground - 1 x daily - 7 x weekly - 3-5 sets - 20 seconds hold  Wall Quarter Squat - 1 x daily - 7 x weekly - 1-3 sets - 10 reps  Wall Quarter Squat with Swiss Ball - 1 x daily - 7 x weekly - 1-3 sets - 8-10 reps  Standing Lumbar Spine Flexion Stretch Counter - 1 x daily - 7 x weekly - 3-5 sets - 10-15 seconds hold

## 2021-06-30 ENCOUNTER — HOSPITAL ENCOUNTER (OUTPATIENT)
Dept: PHYSICAL THERAPY | Age: 56
Setting detail: THERAPIES SERIES
Discharge: HOME | End: 2021-06-30
Payer: COMMERCIAL

## 2021-06-30 PROCEDURE — 97110 THERAPEUTIC EXERCISES: CPT | Performed by: PHYSICAL THERAPIST

## 2021-06-30 PROCEDURE — 97140 MANUAL THERAPY 1/> REGIONS: CPT | Performed by: PHYSICAL THERAPIST

## 2021-06-30 NOTE — FLOWSHEET NOTE
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  · Treatment Diagnosis: M54.16 (ICD-10-CM) - Lumbar radiculopathy  Insurance/Certification information:  PT Insurance Information: L[employer] w/ [employer] auth, $40 cp  Physician Information:  Referring Practitioner: VAZQUEZ Myers  Has the plan of care been signed (Y/N):        [x]  Yes  []  No     Date of Patient follow up with Physician: none scheduled 21      Is this a Progress Report:     []  Yes  [x]  No        If Yes:  Date Range for reporting period:  Beginnin21  Ending:     Progress report will be due (10 Rx or 30 days whichever is less): 77       Recertification will be due (POC Duration  / 90 days whichever is less): 8 weeks        Visit # Insurance Allowable Auth Required   In-person 3 Quantum auth []  Yes []  No    Telehealth   []  Yes []  No    Total            Functional Scale: modODI 30%    Date assessed:  21      Therapy Diagnosis/Practice Pattern:F, conservative      Number of Comorbidities:  []0     [x]1-2    []3+    Latex Allergy:  [x]NO      []YES  Preferred Language for Healthcare:   [x]English       []other:      Pain level: eval 4/10   Current pain NT/10    SUBJECTIVE:  Pt reports her back still gets sore end of day, but she is less stiff overall right now. She does note some mid back stiffness/discomfort today.      OBJECTIVE: See eval   Observation:    Test measurements:      RESTRICTIONS/PRECAUTIONS: none    Exercises/Interventions:     Therapeutic Ex (59989) Sets/sec/reps Notes/CUES   SL open book S w/ top elbow bent HEP   SL clam w/ TA HEP   Supine LTR HEP   Supine bridge w/ spinal articulation HEP   Supine SLR w/ TA  HEP   Supine piriformis S foot on knee 5x20\" R, L HEP             Wall 1/4 squat, w/ SB  HEP   Standing lumbar flex S  HEP   Standing hip abd, post-lat kick x15 ea R/L Posture cues   Seated SB march, kick  Seated SB TB row x20 ea R/L  Green x20 HEP   Self thoracic mob over chair x10 HEP        Pt ed: , HEP, activity mod, TA and lifting mechanics, , back brace use x6'    Manual Intervention (65109)     STM R>L PSM and post hip  SL sacral distraction x15'  3x30\"    Prone thoracic rotation mobs T4-T9, PA's T4-5-6 gr lll-lV x5'                        NMR re-education (21312)  CUES NEEDED                                                Therapeutic Activity (62719)                                       Therapeutic Exercise and NMR EXR  [x] (83678) Provided verbal/tactile cueing for activities related to strengthening, flexibility, endurance, ROM  for improvements in proximal hip and core control with self care, mobility, lifting and ambulation. [x] (62811) Provided verbal/tactile cueing for activities related to improving balance, coordination, kinesthetic sense, posture, motor skill, proprioception  to assist with core control in self care, mobility, lifting, and ambulation.      Therapeutic Activities:    [] (38947 or 32295) Provided verbal/tactile cueing for activities related to improving balance, coordination, kinesthetic sense, posture, motor skill, proprioception and motor activation to allow for proper function  with self care and ADLs  [] (15139) Provided training and instruction to the patient for proper core and proximal hip recruitment and positioning with ambulation re-education     Home Exercise Program:    [x] (55428) Reviewed/Progressed HEP activities related to strengthening, increased AROM to WNL, good LS mobility, good hip ROM to allow for proper joint functioning as indicated by patients Functional Deficits. [] Progressing: [] Met: [] Not Met: [] Adjusted  3. Patient will demonstrate an increase in Strength to good proximal hip and core activation to allow for proper functional mobility as indicated by patients Functional Deficits. [] Progressing: [] Met: [] Not Met: [] Adjusted  4. Patient will return to lifting 20# floor to waist without increased symptoms or restriction. [] Progressing: [] Met: [] Not Met: [] Adjusted  5. Pt will be indep with return to safe gym routine for healthy lifestyle. [] Progressing: [] Met: [] Not Met: [] Adjusted     Progression Towards Functional goals:  [] Patient is progressing as expected towards functional goals listed. [] Progression is slowed due to complexities listed. [] Progression has been slowed due to co-morbidities. [x] Plan just implemented, too soon to assess goals progression  [] Other:     Overall Progression Towards Functional goals/ Treatment Progress Update:  [] Patient is progressing as expected towards functional goals listed. [] Progression is slowed due to complexities/Impairments listed. [] Progression has been slowed due to co-morbidities. [x] Plan just implemented, too soon to assess goals progression <30days   [] Goals require adjustment due to lack of progress  [] Patient is not progressing as expected and requires additional follow up with physician  [] Other    Prognosis for POC: [x] Good [] Fair  [] Poor      Patient requires continued skilled intervention: [x] Yes  [] No    Treatment/Activity Tolerance:  [x] Patient able to complete treatment  [] Patient limited by fatigue  [] Patient limited by pain    [] Patient limited by other medical complications  [] Other:     ASSESSMENT: mid thoracic stiffness improved following mobs and self S over chair.   Posture cues with standing hip abd/glute med kicks R>L stance. Seated SB TE added to HEP. Return to Play: (if applicable)   []  Stage 1: Intro to Strength   []  Stage 2: Return to Run and Strength   []  Stage 3: Return to Jump and Strength   []  Stage 4: Dynamic Strength and Agility   []  Stage 5: Sport Specific Training     []  Ready to Return to Play, Meets All Above Stages   []  Not Ready for Return to Sports   Comments:                         PLAN: See eval  [x] Continue per plan of care [] Alter current plan (see comments above)  [] Plan of care initiated [] Hold pending MD visit [] Discharge      Electronically signed by:  General Floyd, PT    Note: If patient does not return for scheduled/ recommended follow up visits, this note will serve as a discharge from care along with most recent update on progress. Access Code: [email or web address]  URL: [email or web address]  Date: 06/30/2021  Prepared by: General Floyd    Exercises  Sidelying Thoracic Rotation with Open Book - 1-2 x daily - 7 x weekly - 1 sets - 5-10 reps - 5-10 seconds hold  Clamshell - 1-2 x daily - 7 x weekly - 2-3 sets - 10 reps  Supine Lower Trunk Rotation - 1-2 x daily - 7 x weekly - 1-2 sets - 10 reps - 5 seconds hold  Supine Bridge with Spinal Articulation - 1-2 x daily - 7 x weekly - 1-3 sets - 5-10 reps  Supine Active Straight Leg Raise - 1 x daily - 7 x weekly - 2-3 sets - 5-10 reps  Supine Piriformis Stretch with Foot on Ground - 1 x daily - 7 x weekly - 3-5 sets - 20 seconds hold  Wall Quarter Squat - 1 x daily - 7 x weekly - 1-3 sets - 10 reps  Wall Quarter Squat with [employer] - 1 x daily - 7 x weekly - 1-3 sets - 8-10 reps  Standing Lumbar Spine Flexion Stretch Counter - 1 x daily - 7 x weekly - 3-5 sets - 10-15 seconds hold  Seated Thoracic Lumbar Extension with Pectoralis Stretch - 1 x daily - 7 x weekly - 1-2 sets - 5-10 reps  Swiss Ball March - 1 x daily - 7 x weekly - 2-3 sets - 10 reps  Swiss Ball Knee Extension - 1 x daily - 7 x weekly - 2-3 sets - 10 reps

## 2021-07-14 ENCOUNTER — HOSPITAL ENCOUNTER (OUTPATIENT)
Dept: PHYSICAL THERAPY | Age: 56
Setting detail: THERAPIES SERIES
Discharge: HOME | End: 2021-07-14
Payer: COMMERCIAL

## 2021-07-14 PROCEDURE — 97110 THERAPEUTIC EXERCISES: CPT | Performed by: PHYSICAL THERAPIST

## 2021-07-14 PROCEDURE — 97140 MANUAL THERAPY 1/> REGIONS: CPT | Performed by: PHYSICAL THERAPIST

## 2021-07-14 NOTE — FLOWSHEET NOTE
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  · Treatment Diagnosis: M54.16 (ICD-10-CM) - Lumbar radiculopathy  Insurance/Certification information:  PT Insurance Information: L[employer] w/ [employer] auth, $40 cp  Physician Information:  Referring Practitioner: Cala Duane, PA  Has the plan of care been signed (Y/N):        [x]  Yes  []  No     Date of Patient follow up with Physician: none scheduled 21      Is this a Progress Report:     []  Yes  [x]  No        If Yes:  Date Range for reporting period:  Beginnin21  Ending:     Progress report will be due (10 Rx or 30 days whichever is less):        Recertification will be due (POC Duration  / 90 days whichever is less): 8 weeks        Visit # Insurance Allowable Auth Required   In-person  [employer] through 8/15/21 []  Yes []  No    Telehealth   []  Yes []  No    Total            Functional Scale: modODI 30%    Date assessed:  21      Therapy Diagnosis/Practice Pattern:F, conservative      Number of Comorbidities:  []0     [x]1-2    []3+    Latex Allergy:  [x]NO      []YES  Preferred Language for Healthcare:   [x]English       []other:      Pain level: eval 4/10   Current pain NT/10    SUBJECTIVE:  Pt reports she stood a lot yesterday at work and her LB/tail bone was sore, causing her to need to take OTC meds. HEP is going well, but she did not do the SB much due to her grand kids were over and she didn't want them to play with her SB.  She is Met: [] Adjusted      Long Term Goals: To be achieved in: 8 weeks  1. Disability index score of 15% or less for the modODI  to assist with reaching prior level of function. [] Progressing: [] Met: [] Not Met: [] Adjusted  2. Patient will demonstrate increased AROM to WNL, good LS mobility, good hip ROM to allow for proper joint functioning as indicated by patients Functional Deficits. [] Progressing: [] Met: [] Not Met: [] Adjusted  3. Patient will demonstrate an increase in Strength to good proximal hip and core activation to allow for proper functional mobility as indicated by patients Functional Deficits. [] Progressing: [] Met: [] Not Met: [] Adjusted  4. Patient will return to lifting 20# floor to waist without increased symptoms or restriction. [] Progressing: [] Met: [] Not Met: [] Adjusted  5. Pt will be indep with return to safe gym routine for healthy lifestyle. [] Progressing: [] Met: [] Not Met: [] Adjusted     Progression Towards Functional goals:  [] Patient is progressing as expected towards functional goals listed. [] Progression is slowed due to complexities listed. [] Progression has been slowed due to co-morbidities. [x] Plan just implemented, too soon to assess goals progression  [] Other:     Overall Progression Towards Functional goals/ Treatment Progress Update:  [] Patient is progressing as expected towards functional goals listed. [] Progression is slowed due to complexities/Impairments listed. [] Progression has been slowed due to co-morbidities.   [x] Plan just implemented, too soon to assess goals progression <30days   [] Goals require adjustment due to lack of progress  [] Patient is not progressing as expected and requires additional follow up with physician  [] Other    Prognosis for POC: [x] Good [] Fair  [] Poor      Patient requires continued skilled intervention: [x] Yes  [] No    Treatment/Activity Tolerance:  [x] Patient able to complete treatment  [] Patient limited by fatigue  [] Patient limited by pain    [] Patient limited by other medical complications  [] Other:     ASSESSMENT: post hip fatigue following standing hip abd series in R>L stance. Able to complete FSU on 6\" box w/ minimal reported L knee discomfort and none on the R.    Return to Play: (if applicable)   []  Stage 1: Intro to Strength   []  Stage 2: Return to Run and Strength   []  Stage 3: Return to Jump and Strength   []  Stage 4: Dynamic Strength and Agility   []  Stage 5: Sport Specific Training     []  Ready to Return to Play, Meets All Above Stages   []  Not Ready for Return to Sports   Comments:                         PLAN: See eval  [x] Continue per plan of care [] Alter current plan (see comments above)  [] Plan of care initiated [] Hold pending MD visit [] Discharge      Electronically signed by:  Qi Mcdonough PT    Note: If patient does not return for scheduled/ recommended follow up visits, this note will serve as a discharge from care along with most recent update on progress. Access Code: [email or web address]  URL: [email or web address]  Date: 06/30/2021  Prepared by: Qi Mcdonough    Exercises  Sidelying Thoracic Rotation with Open Book - 1-2 x daily - 7 x weekly - 1 sets - 5-10 reps - 5-10 seconds hold  Clamshell - 1-2 x daily - 7 x weekly - 2-3 sets - 10 reps  Supine Lower Trunk Rotation - 1-2 x daily - 7 x weekly - 1-2 sets - 10 reps - 5 seconds hold  Supine Bridge with Spinal Articulation - 1-2 x daily - 7 x weekly - 1-3 sets - 5-10 reps  Supine Active Straight Leg Raise - 1 x daily - 7 x weekly - 2-3 sets - 5-10 reps  Supine Piriformis Stretch with Foot on Ground - 1 x daily - 7 x weekly - 3-5 sets - 20 seconds hold  Wall Quarter Squat - 1 x daily - 7 x weekly - 1-3 sets - 10 reps  Wall Quarter Squat with Swiss Ball - 1 x daily - 7 x weekly - 1-3 sets - 8-10 reps  Standing Lumbar Spine Flexion Stretch Counter - 1 x daily - 7 x weekly - 3-5 sets - 10-15 seconds hold  Seated Thoracic Lumbar Extension with Pectoralis Stretch - 1 x daily - 7 x weekly - 1-2 sets - 5-10 reps  Swiss Ball March - 1 x daily - 7 x weekly - 2-3 sets - 10 reps  Swiss Ball Knee Extension - 1 x daily - 7 x weekly - 2-3 sets - 10 reps

## 2021-07-21 ENCOUNTER — HOSPITAL ENCOUNTER (OUTPATIENT)
Dept: PHYSICAL THERAPY | Age: 56
Setting detail: THERAPIES SERIES
Discharge: HOME | End: 2021-07-21
Payer: COMMERCIAL

## 2021-07-21 PROCEDURE — 97110 THERAPEUTIC EXERCISES: CPT | Performed by: PHYSICAL THERAPIST

## 2021-07-21 PROCEDURE — 97140 MANUAL THERAPY 1/> REGIONS: CPT | Performed by: PHYSICAL THERAPIST

## 2021-07-21 NOTE — PROGRESS NOTES
[address] [place],[address], [address] [facility], [address], [place]    Physical Therapy Treatment Note/ Progress Report:           Date:  2021    Patient Name:  Mendel Freud  \"NAZANIN\"  :  1965  MRN: [id number]  Restrictions/Precautions:    Medical/Treatment Diagnosis Information:  · Diagnosis: M54.16 (ICD-10-CM) - Lumbar radiculopathy, M43.16 (ICD-10-CM) - Spondylolisthesis, lumbar region, M47.816 (ICD-10-CM) - Spondylosis of lumbar region without myelopathy or radiculopathy  · Treatment Diagnosis: M54.16 (ICD-10-CM) - Lumbar radiculopathy  Insurance/Certification information:  PT Insurance Information: LOB[employer] w/ [employer] auth, $40 cp  Physician Information:  Referring Practitioner: VAZQUEZ Guzman  Has the plan of care been signed (Y/N):        [x]  Yes  []  No     Date of Patient follow up with Physician: none scheduled 21      Is this a Progress Report:     [x]  Yes  []  No        If Yes:  Date Range for reporting period:  Beginnin21  Endin21    Progress report will be due (10 Rx or 30 days whichever is less):        Recertification will be due (POC Duration  / 90 days whichever is less): 8 weeks        Visit # Insurance Allowable Auth Required   In-person  [facility] through 8/15/21 []  Yes []  No    Telehealth   []  Yes []  No    Total            Functional Scale: modODI 30%    Date assessed:  21      Therapy Diagnosis/Practice Pattern:F, conservative      Number of Comorbidities:  []0     [x]1-2    []3+    Latex Allergy:  [x]NO      []YES  Preferred Language for Healthcare:   [x]English       []other:      Pain level: eval 4/10   Current pain 4/10 average w/o meds    SUBJECTIVE:  Pt reports she has been more aware of her posture and engaging her core with daily activities. She has only had to take the Napxoen 1x in the last week. She notes she is sore across her LB today from standing.   Asks about what kind of lumbar support to get for work. OBJECTIVE: PN   Observation:    Test measurements:  MMT hip abd 4- R/4 L, knee ext 5 R/5- L, knee flex 5 R/5- L, lumbar flex fingertips to ankles but still pain with return though less UE assist needed    RESTRICTIONS/PRECAUTIONS: none    Exercises/Interventions:     Therapeutic Ex (82426) Sets/sec/reps Notes/CUES   SL open book S w/ top elbow bent HEP   SL clam w/ TA HEP   Supine LTR HEP   Supine bridge w/ spinal articulation  Bridge w/ alt march x10  x10 R/LHEP   Supine SLR w/ TA  HEP   Supine piriformis S foot on knee  HEP        SLS w/ DB fwd reach (mod RDL) 3# 2x5 R/L Stability cues R stance   Wall 1/4 squat, w/ SB  HEP   Standing lumbar flex S 5x15\" HEP   Standing hip abd, post-lat kick x15 ea R/L Posture cues   Seated SB march, kick  Seated SB TB row    HEP   Self thoracic mob over chair  HEP   Standing TB row, SWB seated row  SWB seated TB ext  TB anti-rotation press Blue x15 ea  Blue 2x15  Single blue x20 R/L    FSU 6\" x10 R/L Light UE support, hip cues        Pt ed: , , ad, , , , , lumbar support x3'    Manual Intervention (77235)     STM L>R PSM and post hip  SL sacral distraction w/ cross hand MFR  Supine HS S, piriformis S R/L x15'  3x30\"    2x30\" ea     Prone thoracic rotation mobs T4-T9, PA's T4-5-6 gr lll-lV                         NMR re-education (73720)  CUES NEEDED                                                Therapeutic Activity (42261)                                       Therapeutic Exercise and NMR EXR  [x] (11704) Provided verbal/tactile cueing for activities related to strengthening, flexibility, endurance, ROM  for improvements in proximal hip and core control with self care, mobility, lifting and ambulation. [x] (10276) Provided verbal/tactile cueing for activities related to improving balance, coordination, kinesthetic sense, posture, motor skill, proprioception  to assist with core control in self care, mobility, lifting, and ambulation. Therapeutic Activities:    [] (05225 or 82087) Provided verbal/tactile cueing for activities related to improving balance, coordination, kinesthetic sense, posture, motor skill, proprioception and motor activation to allow for proper function  with self care and ADLs  [] (02701) Provided training and instruction to the patient for proper core and proximal hip recruitment and positioning with ambulation re-education     Home Exercise Program:    [x] (89265) Reviewed/Progressed HEP activities related to strengthening, flexibility, endurance, ROM of core, proximal hip and LE for functional self-care, mobility, lifting and ambulation   [] (65387) Reviewed/Progressed HEP activities related to improving balance, coordination, kinesthetic sense, posture, motor skill, proprioception of core, proximal hip and LE for self care, mobility, lifting, and ambulation      Manual Treatments:  PROM / STM / Oscillations-Mobs:  G-I, II, III, IV (PA's, Inf., Post.)  [x] (85613) Provided manual therapy to mobilize proximal hip and LS spine soft tissue/joints for the purpose of modulating pain, promoting relaxation,  increasing ROM, reducing/eliminating soft tissue swelling/inflammation/restriction, improving soft tissue extensibility and allowing for proper ROM for normal function with self care, mobility, lifting and ambulation. Modalities:  '    Charges  Timed Code Treatment Minutes: 48'   Total Treatment Minutes: 48'     [] EVAL (LOW) 56501   [] EVAL (MOD) 26460   [] EVAL (HIGH) 32436   [] RE-EVAL     [x] EW(18110) x 2    [] IONTO  [] NMR (03298) x     [] VASO  [x] Manual (39696) x 1     [] Other:  [] TA x      [] Mech Traction (13432)  [] ES(attended) (86709)      [] ES (un) (34023):     Goals:   Patient stated goal: able to resume exercising without pain  [x] Progressing: [] Met: [] Not Met: [] Adjusted    Therapist goals for Patient:   Short Term Goals: To be achieved in: 2 weeks  1.  Independent in HEP and progression per patient tolerance, in order to prevent re-injury. [] Progressing: [x] Met: [] Not Met: [] Adjusted  2. Patient will have a decrease in pain to facilitate improvement in movement, function, and ADLs as indicated by Functional Deficits. [] Progressing: [x] Met: [] Not Met: [] Adjusted      Long Term Goals: To be achieved in: 8 weeks  1. Disability index score of 15% or less for the modODI  to assist with reaching prior level of function. [] Progressing: [] Met: [] Not Met: [] Adjusted  2. Patient will demonstrate increased AROM to WNL, good LS mobility, good hip ROM to allow for proper joint functioning as indicated by patients Functional Deficits. [x] Progressing: [] Met: [] Not Met: [] Adjusted  3. Patient will demonstrate an increase in Strength to good proximal hip and core activation to allow for proper functional mobility as indicated by patients Functional Deficits. [x] Progressing: [] Met: [] Not Met: [] Adjusted  4. Patient will return to lifting 20# floor to waist without increased symptoms or restriction. [] Progressing: [] Met: [x] Not Met: [] Adjusted  5. Pt will be indep with return to safe gym routine for healthy lifestyle. [] Progressing: [] Met: [x] Not Met: [] Adjusted     Progression Towards Functional goals:  [x] Patient is progressing as expected towards functional goals listed. [] Progression is slowed due to complexities listed. [] Progression has been slowed due to co-morbidities. [] Plan just implemented, too soon to assess goals progression  [] Other:     Overall Progression Towards Functional goals/ Treatment Progress Update:  [x] Patient is progressing as expected towards functional goals listed. [] Progression is slowed due to complexities/Impairments listed. [] Progression has been slowed due to co-morbidities.   [] Plan just implemented, too soon to assess goals progression <30days   [] Goals require adjustment due to lack of progress  [] Patient is not progressing Leg Raise - 1 x daily - 7 x weekly - 2-3 sets - 5-10 reps  Supine Piriformis Stretch with Foot on Ground - 1 x daily - 7 x weekly - 3-5 sets - 20 seconds hold  Wall Quarter Squat - 1 x daily - 7 x weekly - 1-3 sets - 10 reps  Wall Quarter Squat with Swiss Ball - 1 x daily - 7 x weekly - 1-3 sets - 8-10 reps  Standing Lumbar Spine Flexion Stretch Counter - 1 x daily - 7 x weekly - 3-5 sets - 10-15 seconds hold  Seated Thoracic Lumbar Extension with Pectoralis Stretch - 1 x daily - 7 x weekly - 1-2 sets - 5-10 reps  Swiss Ball March - 1 x daily - 7 x weekly - 2-3 sets - 10 reps  Swiss Ball Knee Extension - 1 x daily - 7 x weekly - 2-3 sets - 10 reps

## 2021-07-26 ENCOUNTER — HOSPITAL ENCOUNTER (OUTPATIENT)
Dept: PHYSICAL THERAPY | Age: 56
Setting detail: THERAPIES SERIES
Discharge: HOME | End: 2021-07-26
Payer: COMMERCIAL

## 2021-07-26 PROCEDURE — 97110 THERAPEUTIC EXERCISES: CPT | Performed by: PHYSICAL THERAPIST

## 2021-07-26 PROCEDURE — 97140 MANUAL THERAPY 1/> REGIONS: CPT | Performed by: PHYSICAL THERAPIST

## 2021-07-26 NOTE — FLOWSHEET NOTE
today.    OBJECTIVE:    Observation:    Test measurements:      RESTRICTIONS/PRECAUTIONS: none    Exercises/Interventions:     Therapeutic Ex (31035) Sets/sec/reps Notes/CUES   SL open book S w/ top elbow bent HEP   SL clam w/ TA HEP   Supine LTR HEP   Supine bridge w/ spinal articulation  Bridge w/ alt march   x10 R/LHEP   Supine SLR w/ TA  HEP   Supine piriformis S foot on knee  HEP   Prone hip ext w/ knee flexed 2x10 R/L         3D dynamic HS S x5 ea R/L UE support on table   SLS w/ DB fwd reach (mod RDL)  Stability cues R stance   Wall 1/4 squat, w/ SB  HEP   Standing lumbar flex S 5x15\" HEP   Standing hip abd, post-lat kick  Posture cues   Seated SB march, kick  Seated SB TB row    HEP   Self thoracic mob over chair  HEP   Standing TB row, SWB seated row  SWB seated TB ext  TB anti-rotation press Blue x15 ea  Blue 2x15      FSU 6\" x10 R/L Light UE support, hip cues        Pt ed: , , ad, , , , , lumbar support x3'    Manual Intervention (80861)     STM L>R PSM and post hip  ART R piriformis  SL sacral distraction w/ cross hand MFR  Supine HS S, piriformis S R/L x15'  x3'  3x30\"    2x30\" ea     Prone thoracic rotation mobs T4-T9, PA's T4-5-6 gr lll-lV     R LE inf mob, lat glide x2' ea                   NMR re-education (20560)  CUES NEEDED                                                Therapeutic Activity (17100)                                       Therapeutic Exercise and NMR EXR  [x] (62950) Provided verbal/tactile cueing for activities related to strengthening, flexibility, endurance, ROM  for improvements in proximal hip and core control with self care, mobility, lifting and ambulation. [x] (67812) Provided verbal/tactile cueing for activities related to improving balance, coordination, kinesthetic sense, posture, motor skill, proprioception  to assist with core control in self care, mobility, lifting, and ambulation.      Therapeutic Activities:    [] (95191 or 20044) Provided verbal/tactile cueing for Met: [] Not Met: [] Adjusted  2. Patient will have a decrease in pain to facilitate improvement in movement, function, and ADLs as indicated by Functional Deficits. [] Progressing: [x] Met: [] Not Met: [] Adjusted      Long Term Goals: To be achieved in: 8 weeks  1. Disability index score of 15% or less for the modODI  to assist with reaching prior level of function. [] Progressing: [] Met: [] Not Met: [] Adjusted  2. Patient will demonstrate increased AROM to WNL, good LS mobility, good hip ROM to allow for proper joint functioning as indicated by patients Functional Deficits. [x] Progressing: [] Met: [] Not Met: [] Adjusted  3. Patient will demonstrate an increase in Strength to good proximal hip and core activation to allow for proper functional mobility as indicated by patients Functional Deficits. [x] Progressing: [] Met: [] Not Met: [] Adjusted  4. Patient will return to lifting 20# floor to waist without increased symptoms or restriction. [] Progressing: [] Met: [x] Not Met: [] Adjusted  5. Pt will be indep with return to safe gym routine for healthy lifestyle. [] Progressing: [] Met: [x] Not Met: [] Adjusted     Progression Towards Functional goals:  [x] Patient is progressing as expected towards functional goals listed. [] Progression is slowed due to complexities listed. [] Progression has been slowed due to co-morbidities. [] Plan just implemented, too soon to assess goals progression  [] Other:     Overall Progression Towards Functional goals/ Treatment Progress Update:  [x] Patient is progressing as expected towards functional goals listed. [] Progression is slowed due to complexities/Impairments listed. [] Progression has been slowed due to co-morbidities.   [] Plan just implemented, too soon to assess goals progression <30days   [] Goals require adjustment due to lack of progress  [] Patient is not progressing as expected and requires additional follow up with physician  [] Other    Prognosis for POC: [x] Good [] Fair  [] Poor      Patient requires continued skilled intervention: [x] Yes  [] No    Treatment/Activity Tolerance:  [x] Patient able to complete treatment  [] Patient limited by fatigue  [] Patient limited by pain    [] Patient limited by other medical complications  [] Other:     ASSESSMENT: increased tightness R post hip noted with manuals and stretching. Trial ART and lateral glides were tolerated well. PPT on SB increased neural pain post hip. Decreased R post hip pain reported following session. Pt encouraged to increase stretching throughout her day. Also ed re: scheduling f/u w/ PA, as she was supposed to follow up after 6-8 visits. Return to Play: (if applicable)   []  Stage 1: Intro to Strength   []  Stage 2: Return to Run and Strength   []  Stage 3: Return to Jump and Strength   []  Stage 4: Dynamic Strength and Agility   []  Stage 5: Sport Specific Training     []  Ready to Return to Play, Meets All Above Stages   []  Not Ready for Return to Sports   Comments:                         PLAN: See eval  [x] Continue per plan of care [] Alter current plan (see comments above)  [] Plan of care initiated [] Hold pending MD visit [] Discharge      Electronically signed by:  Claudell Danger, PT    Note: If patient does not return for scheduled/ recommended follow up visits, this note will serve as a discharge from care along with most recent update on progress. Access Code: [email or web address]  URL: [email or web address]  Date: 06/30/2021  Prepared by: Claudell Danger    Exercises  Sidelying Thoracic Rotation with Open Book - 1-2 x daily - 7 x weekly - 1 sets - 5-10 reps - 5-10 seconds hold  Clamshell - 1-2 x daily - 7 x weekly - 2-3 sets - 10 reps  Supine Lower Trunk Rotation - 1-2 x daily - 7 x weekly - 1-2 sets - 10 reps - 5 seconds hold  Supine Bridge with Spinal Articulation - 1-2 x daily - 7 x weekly - 1-3 sets - 5-10 reps  Supine Active Straight Leg

## 2021-07-27 ENCOUNTER — OFFICE VISIT (OUTPATIENT)
Dept: INTERNAL MEDICINE CLINIC | Age: 56
End: 2021-07-27
Payer: COMMERCIAL

## 2021-07-27 VITALS
SYSTOLIC BLOOD PRESSURE: 130 MMHG | BODY MASS INDEX: 40.06 KG/M2 | HEIGHT: 61 IN | DIASTOLIC BLOOD PRESSURE: 80 MMHG | WEIGHT: 212.2 LBS | OXYGEN SATURATION: 99 % | HEART RATE: 80 BPM

## 2021-07-27 DIAGNOSIS — E78.49 OTHER HYPERLIPIDEMIA: ICD-10-CM

## 2021-07-27 DIAGNOSIS — M17.10 ARTHRITIS OF KNEE: ICD-10-CM

## 2021-07-27 DIAGNOSIS — E55.9 VITAMIN D DEFICIENCY: ICD-10-CM

## 2021-07-27 DIAGNOSIS — E66.01 MORBID OBESITY (HCC): ICD-10-CM

## 2021-07-27 DIAGNOSIS — M54.50 ACUTE RIGHT-SIDED LOW BACK PAIN, UNSPECIFIED WHETHER SCIATICA PRESENT: Primary | ICD-10-CM

## 2021-07-27 DIAGNOSIS — Z83.3 FAMILY HISTORY OF DIABETES MELLITUS (DM): ICD-10-CM

## 2021-07-27 DIAGNOSIS — Z12.31 SCREENING MAMMOGRAM, ENCOUNTER FOR: ICD-10-CM

## 2021-07-27 DIAGNOSIS — Z02.9 ENCOUNTERS FOR ADMINISTRATIVE PURPOSE: ICD-10-CM

## 2021-07-27 PROCEDURE — 99214 OFFICE O/P EST MOD 30 MIN: CPT | Performed by: INTERNAL MEDICINE

## 2021-07-27 RX ORDER — ACETAMINOPHEN 160 MG
1 TABLET,DISINTEGRATING ORAL DAILY
Qty: 30 CAPSULE | Refills: 3 | Status: SHIPPED | OUTPATIENT
Start: 2021-07-27 | End: 2021-10-27 | Stop reason: SDUPTHER

## 2021-07-27 RX ORDER — NAPROXEN 500 MG/1
500 TABLET ORAL 2 TIMES DAILY PRN
Qty: 20 TABLET | Refills: 0 | Status: SHIPPED | OUTPATIENT
Start: 2021-07-27

## 2021-07-27 SDOH — ECONOMIC STABILITY: INCOME INSECURITY: HOW HARD IS IT FOR YOU TO PAY FOR THE VERY BASICS LIKE FOOD, HOUSING, MEDICAL CARE, AND HEATING?: NOT VERY HARD

## 2021-07-27 SDOH — EDUCATIONAL SECURITY: EDUCATION ATTAINMENT: WHAT IS THE HIGHEST LEVEL OF SCHOOL YOU HAVE COMPLETED OR THE HIGHEST DEGREE YOU HAVE RECEIVED?: PATIENT REFUSED

## 2021-07-27 SDOH — ECONOMIC STABILITY: FOOD INSECURITY: WITHIN THE PAST 12 MONTHS, THE FOOD YOU BOUGHT JUST DIDN'T LAST AND YOU DIDN'T HAVE MONEY TO GET MORE.: NEVER TRUE

## 2021-07-27 SDOH — ECONOMIC STABILITY: FOOD INSECURITY: WITHIN THE PAST 12 MONTHS, YOU WORRIED THAT YOUR FOOD WOULD RUN OUT BEFORE YOU GOT MONEY TO BUY MORE.: NEVER TRUE

## 2021-07-27 ASSESSMENT — PATIENT HEALTH QUESTIONNAIRE - PHQ9
SUM OF ALL RESPONSES TO PHQ QUESTIONS 1-9: 0
SUM OF ALL RESPONSES TO PHQ9 QUESTIONS 1 & 2: 0
1. LITTLE INTEREST OR PLEASURE IN DOING THINGS: 0
SUM OF ALL RESPONSES TO PHQ QUESTIONS 1-9: 0
SUM OF ALL RESPONSES TO PHQ QUESTIONS 1-9: 0
2. FEELING DOWN, DEPRESSED OR HOPELESS: 0

## 2021-07-27 ASSESSMENT — SOCIAL DETERMINANTS OF HEALTH (SDOH): HOW HARD IS IT FOR YOU TO PAY FOR THE VERY BASICS LIKE FOOD, HOUSING, MEDICAL CARE, AND HEATING?: NOT HARD AT ALL

## 2021-07-27 NOTE — PROGRESS NOTES
triamcinolone (KENALOG) 0.025 % cream APPLY TOPICALLY TO RASH TWO TIMES A DAY AS NEEDED Yes Rosa Isela Finders, APRN - CNP   Multiple Vitamins-Minerals (THERAPEUTIC MULTIVITAMIN-MINERALS) tablet Take 1 tablet by mouth daily Yes Historical Provider, MD   Chromium Picolinate 200 MCG CAPS Take by mouth 2 times daily  Yes Historical Provider, MD   naproxen (NAPROSYN) 500 MG tablet Take 1 tablet by mouth 2 times daily for 20 doses  Christina Singh MD       ROS: COMPREHENSIVE ROS AS IN HX, REST -VE  History obtained from chart review and the patient      OBJECTIVE:   NURSING NOTE AND VITALS REVIEWED  /60 (Site: Left Upper Arm, Position: Sitting, Cuff Size: Medium Adult)   Pulse 80   Ht 5' 1\" (1.549 m)   Wt 212 lb 3.2 oz (96.3 kg)   SpO2 99%   BMI 40.09 kg/m²     NO ACUTE DISTRESS    REPEAT BP: 130/80 (LT), NO ORTHOSTASIS     Body mass index is 40.09 kg/m². HEENT: NO PALLOR, ANICTERIC, PERRLA, EOMI, NO CONJUNCTIVAL ERYTHEMA,                 NO SINUS TENDERNESS  NECK:  SUPPLE, TRACHEA MIDLINE, NT, NO JVD, NO CB, NO LA, NO TM, NO STIFFNESS  CHEST: RESPY EFFORT NL, GOOD AE, NO W/R/C  HEART: S1S2+ REG, NO M/G/R  ABD: OBESE, SOFT, NT, NO HSM, BS+  EXT: NO EDEMA, NT, PULSES +. JOSE MARTIN'S -VE  NEURO: ALERT AND ORIENTED X 3, NO MENINGEAL SIGNS, NO TREMORS, NL GAIT, NO FOCAL DEFICITS  PSYCH: FAIRLY GOOD AFFECT  BACK: NT, NO ROM, NO CVA TENDERNESS  KNEE: + TENDERNESS LT, + PAIN WITH MVT LT, NO ROM      PREVIOUS LABS  REVIEWED AND D/W PT     ASSESSMENT / PLAN:     Diagnosis Orders   1. Acute right-sided low back pain, unspecified whether sciatica present  COUNSELLED. ? OA. EXERCISES. ANALGESICS PRN. MONITOR. NAPROSYN 500 MG PRN WITH FOOD. F/U ORTHO , PHYSICAL THERAPY  MAKE CHANGES AS NEEDED. 2. Other hyperlipidemia  COUNSELLED. ADVISED LOW FAT / CHOL DIET/ EXERCISE.  MONITOR. F/U LABS  GOALS D/W PT.  MAKE CHANGES AS NEEDED. 3. Arthritis of knee  COUNSELLED. EXERCISES. ANALGESICS PRN. MONITOR. NAPROSYN 500 MG PRN WITH FOOD AS ABOVE. HOME EXERCISES  MAKE CHANGES AS NEEDED. 4. Vitamin D deficiency  COUNSELLED. ADVISED MED COMPLIANCE - ADVISED VIT D 2000 U DAILY. MONITOR AND MAKE CHANGES AS NEEDED. 5. Family history of diabetes mellitus (DM)  COUNSELLED. ADVISED ON DIET / EXERCISE  ADVISED RISK FACTOR MODIFICATION. F/U LABS TO [facility]. MONITOR  MAKE CHANGES AS NEEDED. 6. Morbid obesity ([address]  COUNSELLED. DIET/ EXERCISE ADVISED. LIFESTYLE MODIFICATION. WT LOSS ADVISED. MONITOR AND MAKE CHANGES AS NEEDED. 7. Encounters for administrative purpose  COUNSELLED. FMLA REVIEWED AND COMPLETED. SEE COPY IN CHART. MAKE CHANGES AS NEEDED       8. Screening mammogram, encounter for  COUNSELLED.  KUSHAL DIGITAL SCREEN W OR WO CAD BILATERAL ORDERED                     MEDICATION SIDE EFFECTS D/W PATIENT    RETURN TO CLINIC WITHIN 3 MONTHS / PRN    FOLLOW UP FOR LABS, MAMMOGRAM , WITH ORTHO, PHYSICAL THERAPY

## 2021-07-27 NOTE — PATIENT INSTRUCTIONS
TAKE MED AS ADVISED    DIET/ EXERCISE.     FOLLOW UP WITHIN 3 MONTHS / AS NEEDED    FOLLOW UP FOR LABS, MAMMOGRAM , WITH ORTHO, PHYSICAL THERAPY

## 2021-07-28 ENCOUNTER — HOSPITAL ENCOUNTER (OUTPATIENT)
Dept: PHYSICAL THERAPY | Age: 56
Setting detail: THERAPIES SERIES
Discharge: HOME | End: 2021-07-28
Payer: COMMERCIAL

## 2021-07-28 PROCEDURE — 97140 MANUAL THERAPY 1/> REGIONS: CPT | Performed by: PHYSICAL THERAPIST

## 2021-07-28 PROCEDURE — 97110 THERAPEUTIC EXERCISES: CPT | Performed by: PHYSICAL THERAPIST

## 2021-07-28 NOTE — FLOWSHEET NOTE
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) - Spondylosis of lumbar region without myelopathy or radiculopathy  · Treatment Diagnosis: M54.16 (ICD-10-CM) - Lumbar radiculopathy  Insurance/Certification information:  PT Insurance Information: L[employer] w/ [employer] auth, $40 cp  Physician Information:  Referring Practitioner: VAZQUEZ Maldonado  Has the plan of care been signed (Y/N):        [x]  Yes  []  No     Date of Patient follow up with Physician: none scheduled 21      Is this a Progress Report:     [x]  Yes  []  No        If Yes:  Date Range for reporting period:  Beginnin21  Endin21    Progress report will be due (10 Rx or 30 days whichever is less):        Recertification will be due (POC Duration  / 90 days whichever is less): 8 weeks        Visit # Insurance Allowable Auth Required   In-person  Quantum auth through 8/15/21 []  Yes []  No    Telehealth   []  Yes []  No    Total            Functional Scale: modODI 30%    Date assessed:  21      Therapy Diagnosis/Practice Pattern:F, conservative      Number of Comorbidities:  []0     [x]1-2    []3+    Latex Allergy:  [x]NO      []YES  Preferred Language for Healthcare:   [x]English       []other:      Pain level: eval 4/10   Current pain 4/10 average w/o meds    SUBJECTIVE:  Pt reports she is feeling pretty good today, but she did have to take a Naproxen last night.     OBJECTIVE:    Observation:    Test measurements:      RESTRICTIONS/PRECAUTIONS: none    Exercises/Interventions:     Therapeutic Ex (19153) Sets/sec/reps Notes/CUES   SL open book S w/ top elbow bent HEP   SL clam w/ TA HEP   Supine LTR HEP   Supine bridge w/ spinal articulation  Bridge w/ alt march   x10 R/LHEP   Supine SLR w/ TA  HEP   Supine piriformis S foot on knee  HEP   Prone hip ext w/ knee flexed     Supine quad S  2x1' R/L HEP        3D dynamic HS S  UE support on table   SLS w/ DB fwd reach (mod RDL) 3# x15 R/L Stability cues R stance   Wall 1/4 squat, w/ SB  HEP   Standing lumbar flex S 5x15\" HEP   Standing hip abd, post-lat kick x15 ea R/L on air-ex Posture cues   Seated SB march, kick  Seated SB TB row    HEP   Self thoracic mob over chair  HEP    SWB seated row  SWB seated TB ext, LPD  TB anti-rotation press   Blue 2x10 ea  Single blue x20 R/L    FSU  LSD   4\" x15 R/L Light UE support, hip cues   Mini squat w/ MB grab from table 4000g x15         Pt ed: , , ad, , , , , lumbar support     Manual Intervention (21569)     STM L>R PSM and post hip  ART R piriformis  SL sacral distraction   Supine HS S, piriformis S R/L x10'    2x30\"    2x30\" ea     Prone thoracic rotation mobs T4-T9, PA's T4-5-6 gr lll-lV     R LE inf mob, lat glide x2' ea                   NMR re-education (99696)  CUES NEEDED                                                Therapeutic Activity (45004)                                       Therapeutic Exercise and NMR EXR  [x] (74174) Provided verbal/tactile cueing for activities related to strengthening, flexibility, endurance, ROM  for improvements in proximal hip and core control with self care, mobility, lifting and ambulation. [x] (38050) Provided verbal/tactile cueing for activities related to improving balance, coordination, kinesthetic sense, posture, motor skill, proprioception  to assist with core control in self care, mobility, lifting, and ambulation.      Therapeutic Activities:    [] (92391 or 18541) Provided verbal/tactile cueing for activities related to improving balance, coordination, kinesthetic pain to facilitate improvement in movement, function, and ADLs as indicated by Functional Deficits. [] Progressing: [x] Met: [] Not Met: [] Adjusted      Long Term Goals: To be achieved in: 8 weeks  1. Disability index score of 15% or less for the modODI  to assist with reaching prior level of function. [] Progressing: [] Met: [] Not Met: [] Adjusted  2. Patient will demonstrate increased AROM to WNL, good LS mobility, good hip ROM to allow for proper joint functioning as indicated by patients Functional Deficits. [x] Progressing: [] Met: [] Not Met: [] Adjusted  3. Patient will demonstrate an increase in Strength to good proximal hip and core activation to allow for proper functional mobility as indicated by patients Functional Deficits. [x] Progressing: [] Met: [] Not Met: [] Adjusted  4. Patient will return to lifting 20# floor to waist without increased symptoms or restriction. [] Progressing: [] Met: [x] Not Met: [] Adjusted  5. Pt will be indep with return to safe gym routine for healthy lifestyle. [] Progressing: [] Met: [x] Not Met: [] Adjusted     Progression Towards Functional goals:  [x] Patient is progressing as expected towards functional goals listed. [] Progression is slowed due to complexities listed. [] Progression has been slowed due to co-morbidities. [] Plan just implemented, too soon to assess goals progression  [] Other:     Overall Progression Towards Functional goals/ Treatment Progress Update:  [x] Patient is progressing as expected towards functional goals listed. [] Progression is slowed due to complexities/Impairments listed. [] Progression has been slowed due to co-morbidities.   [] Plan just implemented, too soon to assess goals progression <30days   [] Goals require adjustment due to lack of progress  [] Patient is not progressing as expected and requires additional follow up with physician  [] Other    Prognosis for POC: [x] Good [] Fair  [] Poor      Patient requires continued skilled intervention: [x] Yes  [] No    Treatment/Activity Tolerance:  [x] Patient able to complete treatment  [] Patient limited by fatigue  [] Patient limited by pain    [] Patient limited by other medical complications  [] Other:     ASSESSMENT: pt able to resume/progress standing core and LE strengthening this visit with appropriate fatigue reported following. Improved control with LSD and decreased R post hip fatigue with hip abd/ext. Return to Play: (if applicable)   []  Stage 1: Intro to Strength   []  Stage 2: Return to Run and Strength   []  Stage 3: Return to Jump and Strength   []  Stage 4: Dynamic Strength and Agility   []  Stage 5: Sport Specific Training     []  Ready to Return to Play, Meets All Above Stages   []  Not Ready for Return to Sports   Comments:                         PLAN: See eval  [x] Continue per plan of care [] Alter current plan (see comments above)  [] Plan of care initiated [] Hold pending MD visit [] Discharge      Electronically signed by:  Leopold Elbe, PT    Note: If patient does not return for scheduled/ recommended follow up visits, this note will serve as a discharge from care along with most recent update on progress. Access Code: [email or web address]  URL: [email or web address]  Date: 06/30/2021  Prepared by: Leopold Elbe    Exercises  Sidelying Thoracic Rotation with Open Book - 1-2 x daily - 7 x weekly - 1 sets - 5-10 reps - 5-10 seconds hold  Clamshell - 1-2 x daily - 7 x weekly - 2-3 sets - 10 reps  Supine Lower Trunk Rotation - 1-2 x daily - 7 x weekly - 1-2 sets - 10 reps - 5 seconds hold  Supine Bridge with Spinal Articulation - 1-2 x daily - 7 x weekly - 1-3 sets - 5-10 reps  Supine Active Straight Leg Raise - 1 x daily - 7 x weekly - 2-3 sets - 5-10 reps  Supine Piriformis Stretch with Foot on Ground - 1 x daily - 7 x weekly - 3-5 sets - 20 seconds hold  Wall Quarter Squat - 1 x daily - 7 x weekly - 1-3 sets - 10 reps  Wall Quarter Squat with [employer] - 1 x daily - 7 x weekly - 1-3 sets - 8-10 reps  Standing Lumbar Spine Flexion Stretch Counter - 1 x daily - 7 x weekly - 3-5 sets - 10-15 seconds hold  Seated Thoracic Lumbar Extension with Pectoralis Stretch - 1 x daily - 7 x weekly - 1-2 sets - 5-10 reps  Swiss Ball March - 1 x daily - 7 x weekly - 2-3 sets - 10 reps  Swiss Ball Knee Extension - 1 x daily - 7 x weekly - 2-3 sets - 10 reps

## 2021-08-02 ENCOUNTER — HOSPITAL ENCOUNTER (OUTPATIENT)
Dept: PHYSICAL THERAPY | Age: 56
Setting detail: THERAPIES SERIES
Discharge: HOME | End: 2021-08-02
Payer: COMMERCIAL

## 2021-08-02 DIAGNOSIS — M54.50 ACUTE RIGHT-SIDED LOW BACK PAIN, UNSPECIFIED WHETHER SCIATICA PRESENT: ICD-10-CM

## 2021-08-02 DIAGNOSIS — E55.9 VITAMIN D DEFICIENCY: ICD-10-CM

## 2021-08-02 DIAGNOSIS — E78.49 OTHER HYPERLIPIDEMIA: ICD-10-CM

## 2021-08-02 DIAGNOSIS — Z83.3 FAMILY HISTORY OF DIABETES MELLITUS (DM): ICD-10-CM

## 2021-08-02 LAB
A/G RATIO: 1.7 (ref 1.1–2.2)
ALBUMIN SERPL-MCNC: 4.1 G/DL (ref 3.4–5)
ALP BLD-CCNC: 36 U/L (ref 40–129)
ALT SERPL-CCNC: 24 U/L (ref 10–40)
ANION GAP SERPL CALCULATED.3IONS-SCNC: 9 MMOL/L (ref 3–16)
AST SERPL-CCNC: 33 U/L (ref 15–37)
BACTERIA: ABNORMAL /HPF
BILIRUB SERPL-MCNC: 0.4 MG/DL (ref 0–1)
BILIRUBIN URINE: NEGATIVE
BLOOD, URINE: NEGATIVE
BUN BLDV-MCNC: 12 MG/DL (ref 7–20)
CALCIUM SERPL-MCNC: 9.4 MG/DL (ref 8.3–10.6)
CHLORIDE BLD-SCNC: 106 MMOL/L (ref 99–110)
CHOLESTEROL, TOTAL: 188 MG/DL (ref 0–199)
CLARITY: ABNORMAL
CO2: 26 MMOL/L (ref 21–32)
COLOR: YELLOW
COMMENT UA: ABNORMAL
CREAT SERPL-MCNC: 0.6 MG/DL (ref 0.6–1.1)
EPITHELIAL CELLS, UA: 8 /HPF (ref 0–5)
GFR AFRICAN AMERICAN: >60
GFR NON-AFRICAN AMERICAN: >60
GLOBULIN: 2.4 G/DL
GLUCOSE BLD-MCNC: 74 MG/DL (ref 70–99)
GLUCOSE URINE: NEGATIVE MG/DL
HDLC SERPL-MCNC: 59 MG/DL (ref 40–60)
HYALINE CASTS: 3 /LPF (ref 0–8)
KETONES, URINE: NEGATIVE MG/DL
LDL CHOLESTEROL CALCULATED: 118 MG/DL
LEUKOCYTE ESTERASE, URINE: ABNORMAL
MICROSCOPIC EXAMINATION: YES
MUCUS: ABNORMAL /LPF
NITRITE, URINE: NEGATIVE
PH UA: 7 (ref 5–8)
POTASSIUM SERPL-SCNC: 4.4 MMOL/L (ref 3.5–5.1)
PROTEIN UA: NEGATIVE MG/DL
RBC UA: ABNORMAL /HPF (ref 0–4)
SODIUM BLD-SCNC: 141 MMOL/L (ref 136–145)
SPECIFIC GRAVITY UA: 1.02 (ref 1–1.03)
TOTAL PROTEIN: 6.5 G/DL (ref 6.4–8.2)
TRIGL SERPL-MCNC: 56 MG/DL (ref 0–150)
URINE REFLEX TO CULTURE: ABNORMAL
URINE TYPE: ABNORMAL
UROBILINOGEN, URINE: 1 E.U./DL
VITAMIN D 25-HYDROXY: 33.1 NG/ML
VLDLC SERPL CALC-MCNC: 11 MG/DL
WBC UA: 2 /HPF (ref 0–5)

## 2021-08-02 PROCEDURE — 97110 THERAPEUTIC EXERCISES: CPT | Performed by: PHYSICAL THERAPIST

## 2021-08-02 PROCEDURE — 97140 MANUAL THERAPY 1/> REGIONS: CPT | Performed by: PHYSICAL THERAPIST

## 2021-08-02 NOTE — FLOWSHEET NOTE
[facility], [address], [address] [place], [place]    Physical Therapy Treatment Note/ Progress Report:           Date:  2021    Patient Name:  Alexa Centeno  \"NAZANIN\"  :  1965  MRN: [id number]  Restrictions/Precautions:    Medical/Treatment Diagnosis Information:  · Diagnosis: M54.16 (ICD-10-CM) - Lumbar radiculopathy, M43.16 (ICD-10-CM) - Spondylolisthesis, lumbar region, M47.816 (ICD-10-CM) - Spondylosis of lumbar region without myelopathy or radiculopathy  · Treatment Diagnosis: M54.16 (ICD-10-CM) - Lumbar radiculopathy  Insurance/Certification information:  PT Insurance Information: L[employer] w/ [employer] auth, $40 cp  Physician Information:  Referring Practitioner: VAZQUEZ Bhatti  Has the plan of care been signed (Y/N):        [x]  Yes  []  No     Date of Patient follow up with Physician: none scheduled 21      Is this a Progress Report:     [x]  Yes  []  No        If Yes:  Date Range for reporting period:  Beginnin21  Endin21    Progress report will be due (10 Rx or 30 days whichever is less): 59       Recertification will be due (POC Duration  / 90 days whichever is less): 8 weeks        Visit # Insurance Allowable Auth Required   In-person  [employer] auth through 8/15/21 []  Yes []  No    Telehealth   []  Yes []  No    Total            Functional Scale: modODI 30%    Date assessed:  21      Therapy Diagnosis/Practice Pattern:F, conservative      Number of Comorbidities:  []0     [x]1-2    []3+    Latex Allergy:  [x]NO      []YES  Preferred Language for Healthcare:   [x]English       []other:      Pain level: eval 4/10   Current pain 4/10 average w/o meds    SUBJECTIVE:  Pt reports she has some soreness in her R hip, but her back is feeling pretty good today.     OBJECTIVE:    Observation:    Test measurements:      RESTRICTIONS/PRECAUTIONS: none    Exercises/Interventions:     Therapeutic Ex (02365) Sets/sec/reps Notes/CUES   SL open book S w/ top elbow bent HEP   SL clam w/ TA HEP   Supine LTR HEP   Supine bridge w/ spinal articulation  Bridge w/ alt march   x15 R/LHEP   Supine SLR w/ TA  HEP   Supine piriformis S foot on knee  HEP   Prone hip ext w/ knee flexed     Supine quad S   HEP        3D dynamic HS S  UE support on table   SLS w/ DB fwd reach (mod RDL) 3# x15 R/L Stability cues R stance   Wall 1/4 squat, w/ SB  HEP   Standing lumbar flex S 5x15\" HEP   Standing hip abd, post-lat kick x15 ea R/L on air-ex Posture cues   Seated SB march, kick  Seated SB TB row    HEP   Self thoracic mob over chair  HEP    SWB seated row  SWB seated TB ext, LPD  TB anti-rotation press     Single blue x20 R/L    FSU  LSD  Hip hike   4\" x15 R/L  2\" x10 R/L Light UE support, hip cues   Mini squat w/ MB grab from table 4000g x15         Pt ed: , , ad, , , , , lumbar support     Manual Intervention (55880)     STM L>R PSM and post hip  ART R piriformis  SL sacral distraction   Supine HS S, piriformis S R/L x10'    2x30\"    2x30\" ea     Prone thoracic rotation mobs T4-T9, PA's T4-5-6 gr lll-lV     R LE inf mob, lat glide x2' ea                   NMR re-education (68252)  CUES NEEDED                                                Therapeutic Activity (44564)                                       Therapeutic Exercise and NMR EXR  [x] (82091) Provided verbal/tactile cueing for activities related to strengthening, flexibility, endurance, ROM  for improvements in proximal hip and core control with self care, mobility, lifting and ambulation. [x] (09202) Provided verbal/tactile cueing for activities related to improving balance, coordination, kinesthetic sense, posture, motor skill, proprioception  to assist with core control in self care, mobility, lifting, and ambulation.      Therapeutic Activities:    [] (35304 or 95916) Provided verbal/tactile cueing for activities related to improving balance, coordination, kinesthetic sense, posture, motor skill, proprioception and motor activation to allow for proper function  with self care and ADLs  [] (77248) Provided training and instruction to the patient for proper core and proximal hip recruitment and positioning with ambulation re-education     Home Exercise Program:    [x] (53735) Reviewed/Progressed HEP activities related to strengthening, flexibility, endurance, ROM of core, proximal hip and LE for functional self-care, mobility, lifting and ambulation   [] (65730) Reviewed/Progressed HEP activities related to improving balance, coordination, kinesthetic sense, posture, motor skill, proprioception of core, proximal hip and LE for self care, mobility, lifting, and ambulation      Manual Treatments:  PROM / STM / Oscillations-Mobs:  G-I, II, III, IV (PA's, Inf., Post.)  [x] (74537) Provided manual therapy to mobilize proximal hip and LS spine soft tissue/joints for the purpose of modulating pain, promoting relaxation,  increasing ROM, reducing/eliminating soft tissue swelling/inflammation/restriction, improving soft tissue extensibility and allowing for proper ROM for normal function with self care, mobility, lifting and ambulation. Modalities:   MHP in L SL x10'    Charges  Timed Code Treatment Minutes: 39'   Total Treatment Minutes: 54'     [] EVAL (LOW) 59555   [] EVAL (MOD) 86116   [] EVAL (HIGH) 98862   [] RE-EVAL     [x] CR(70755) x 2   [] IONTO  [] NMR (21132) x     [] VASO  [x] Manual (24760) x 1     [] Other:  [] TA x      [] Mech Traction (33440)  [] ES(attended) (83234)      [] ES (un) (78277):     Goals:   Patient stated goal: able to resume exercising without pain  [x] Progressing: [] Met: [] Not Met: [] Adjusted    Therapist goals for Patient:   Short Term Goals: To be achieved in: 2 weeks  1. Independent in HEP and progression per patient tolerance, in order to prevent re-injury. [] Progressing: [x] Met: [] Not Met: [] Adjusted  2.  Patient will have a decrease in pain to facilitate improvement in movement, function, and ADLs as indicated by Functional Deficits. [] Progressing: [x] Met: [] Not Met: [] Adjusted      Long Term Goals: To be achieved in: 8 weeks  1. Disability index score of 15% or less for the modODI  to assist with reaching prior level of function. [] Progressing: [] Met: [] Not Met: [] Adjusted  2. Patient will demonstrate increased AROM to WNL, good LS mobility, good hip ROM to allow for proper joint functioning as indicated by patients Functional Deficits. [x] Progressing: [] Met: [] Not Met: [] Adjusted  3. Patient will demonstrate an increase in Strength to good proximal hip and core activation to allow for proper functional mobility as indicated by patients Functional Deficits. [x] Progressing: [] Met: [] Not Met: [] Adjusted  4. Patient will return to lifting 20# floor to waist without increased symptoms or restriction. [] Progressing: [] Met: [x] Not Met: [] Adjusted  5. Pt will be indep with return to safe gym routine for healthy lifestyle. [] Progressing: [] Met: [x] Not Met: [] Adjusted     Progression Towards Functional goals:  [x] Patient is progressing as expected towards functional goals listed. [] Progression is slowed due to complexities listed. [] Progression has been slowed due to co-morbidities. [] Plan just implemented, too soon to assess goals progression  [] Other:     Overall Progression Towards Functional goals/ Treatment Progress Update:  [x] Patient is progressing as expected towards functional goals listed. [] Progression is slowed due to complexities/Impairments listed. [] Progression has been slowed due to co-morbidities.   [] Plan just implemented, too soon to assess goals progression <30days   [] Goals require adjustment due to lack of progress  [] Patient is not progressing as expected and requires additional follow up with physician  [] Other    Prognosis for POC: [x] Good [] Fair  [] Poor      Patient requires continued skilled intervention: [x] Yes  [] No    Treatment/Activity Tolerance:  [x] Patient able to complete treatment  [] Patient limited by fatigue  [] Patient limited by pain    [] Patient limited by other medical complications  [] Other:     ASSESSMENT: hip fatigue with standing progressions this visit, but no reported LBP. Improving R hip stability in stance. Return to Play: (if applicable)   []  Stage 1: Intro to Strength   []  Stage 2: Return to Run and Strength   []  Stage 3: Return to Jump and Strength   []  Stage 4: Dynamic Strength and Agility   []  Stage 5: Sport Specific Training     []  Ready to Return to Play, Meets All Above Stages   []  Not Ready for Return to Sports   Comments:                         PLAN: See eval  [x] Continue per plan of care [] Alter current plan (see comments above)  [] Plan of care initiated [] Hold pending MD visit [] Discharge      Electronically signed by:  Irma Beverly PT    Note: If patient does not return for scheduled/ recommended follow up visits, this note will serve as a discharge from care along with most recent update on progress. Access Code: [email or web address]  URL: [email or web address]  Date: 06/30/2021  Prepared by: Irma Beverly    Exercises  Sidelying Thoracic Rotation with Open Book - 1-2 x daily - 7 x weekly - 1 sets - 5-10 reps - 5-10 seconds hold  Clamshell - 1-2 x daily - 7 x weekly - 2-3 sets - 10 reps  Supine Lower Trunk Rotation - 1-2 x daily - 7 x weekly - 1-2 sets - 10 reps - 5 seconds hold  Supine Bridge with Spinal Articulation - 1-2 x daily - 7 x weekly - 1-3 sets - 5-10 reps  Supine Active Straight Leg Raise - 1 x daily - 7 x weekly - 2-3 sets - 5-10 reps  Supine Piriformis Stretch with Foot on Ground - 1 x daily - 7 x weekly - 3-5 sets - 20 seconds hold  Wall Quarter Squat - 1 x daily - 7 x weekly - 1-3 sets - 10 reps  Wall Quarter Squat with Swiss Ball - 1 x daily - 7 x weekly - 1-3 sets - 8-10 reps  Standing Lumbar Spine Flexion Stretch Counter - 1 x daily - 7 x weekly - 3-5 sets - 10-15 seconds hold  Seated Thoracic Lumbar Extension with Pectoralis Stretch - 1 x daily - 7 x weekly - 1-2 sets - 5-10 reps  Swiss Ball March - 1 x daily - 7 x weekly - 2-3 sets - 10 reps  Swiss Ball Knee Extension - 1 x daily - 7 x weekly - 2-3 sets - 10 reps

## 2021-08-03 LAB
ESTIMATED AVERAGE GLUCOSE: 114 MG/DL
HBA1C MFR BLD: 5.6 %

## 2021-08-04 ENCOUNTER — HOSPITAL ENCOUNTER (OUTPATIENT)
Dept: PHYSICAL THERAPY | Age: 56
Setting detail: THERAPIES SERIES
Discharge: HOME | End: 2021-08-04
Payer: COMMERCIAL

## 2021-08-04 PROCEDURE — 97140 MANUAL THERAPY 1/> REGIONS: CPT | Performed by: PHYSICAL THERAPIST

## 2021-08-04 PROCEDURE — 97110 THERAPEUTIC EXERCISES: CPT | Performed by: PHYSICAL THERAPIST

## 2021-08-04 NOTE — FLOWSHEET NOTE
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  · Treatment Diagnosis: M54.16 (ICD-10-CM) - Lumbar radiculopathy  Insurance/Certification information:  PT Insurance Information: LOBO Mitchell w/ [employer] auth, $40 cp  Physician Information:  Referring Practitioner: VAZQUEZ Banks  Has the plan of care been signed (Y/N):        [x]  Yes  []  No     Date of Patient follow up with Physician: 21      Is this a Progress Report:     [x]  Yes  []  No        If Yes:  Date Range for reporting period:  Beginnin21  Endin21    Progress report will be due (10 Rx or 30 days whichever is less): 94       Recertification will be due (POC Duration  / 90 days whichever is less): 8 weeks        Visit # Insurance Allowable Auth Required   In-person  Quantum Nonda Holstein through 8/15/21 []  Yes []  No    Telehealth   []  Yes []  No    Total            Functional Scale: modODI 30%    Date assessed:  21      Therapy Diagnosis/Practice Pattern:F, conservative      Number of Comorbidities:  []0     [x]1-2    []3+    Latex Allergy:  [x]NO      []YES  Preferred Language for Healthcare:   [x]English       []other:      Pain level: eval 4/10   Current pain 4/10 average w/o meds    SUBJECTIVE:  Pt reports she has worked the past 2 days and the pain hasn't been too bad. She hasn't had to take any medication in several days.     OBJECTIVE: PN NV   Observation:    Test measurements:      RESTRICTIONS/PRECAUTIONS: none    Exercises/Interventions:     Therapeutic Ex (47178) Sets/sec/reps Notes/CUES   SL open book S w/ top elbow bent HEP   SL clam w/ TA HEP   Supine LTR HEP   Supine bridge w/ spinal articulation  Bridge w/ alt march   x15 R/LHEP   Supine SLR w/ TA  HEP   Supine piriformis S foot on knee  HEP   Prone hip ext w/ knee flexed x15 R/L    Supine quad S   HEP        3D dynamic HS S  UE support on table   SLS w/ DB fwd reach (mod RDL)  Stability cues R stance, NV fatigued   Wall 1/4 squat, w/ SB  HEP   Standing lumbar flex S 5x15\" HEP   Standing hip abd, post-lat kick  Posture cues   Seated SB march, kick  Seated SB TB row    HEP   Self thoracic mob over chair  HEP    SWB seated row  SWB seated TB ext, LPD  TB anti-rotation press     Single gray x20 R/L    FSU  LSD  Hip hike     2\" x10 R/L Light UE support, hip cues  R hip cramp after   Mini squat w/ MB grab from table     TB hip ext from front to neutral (tendu) YTB x15 R/L Light UE support        Pt ed: , , ad, , , , , lumbar support     Manual Intervention (46605)     STM L>R PSM and post hip  ART R piriformis  SL sacral distraction   Supine HS S, piriformis S R/L x14'        2x30\" ea     Prone thoracic rotation mobs T4-T9, PA's T4-5-6 gr lll-lV     R LE inf mob, lat glide                    NMR re-education (51022)  CUES NEEDED                                                Therapeutic Activity (71919)                                       Therapeutic Exercise and NMR EXR  [x] (00222) Provided verbal/tactile cueing for activities related to strengthening, flexibility, endurance, ROM  for improvements in proximal hip and core control with self care, mobility, lifting and ambulation. [x] (07172) Provided verbal/tactile cueing for activities related to improving balance, coordination, kinesthetic sense, posture, motor skill, proprioception  to assist with core control in self care, mobility, lifting, and ambulation.      Therapeutic Activities:    [] (87453 or 44019) prevent re-injury. [] Progressing: [x] Met: [] Not Met: [] Adjusted  2. Patient will have a decrease in pain to facilitate improvement in movement, function, and ADLs as indicated by Functional Deficits. [] Progressing: [x] Met: [] Not Met: [] Adjusted      Long Term Goals: To be achieved in: 8 weeks  1. Disability index score of 15% or less for the modODI  to assist with reaching prior level of function. [] Progressing: [] Met: [] Not Met: [] Adjusted  2. Patient will demonstrate increased AROM to WNL, good LS mobility, good hip ROM to allow for proper joint functioning as indicated by patients Functional Deficits. [x] Progressing: [] Met: [] Not Met: [] Adjusted  3. Patient will demonstrate an increase in Strength to good proximal hip and core activation to allow for proper functional mobility as indicated by patients Functional Deficits. [x] Progressing: [] Met: [] Not Met: [] Adjusted  4. Patient will return to lifting 20# floor to waist without increased symptoms or restriction. [] Progressing: [] Met: [x] Not Met: [] Adjusted  5. Pt will be indep with return to safe gym routine for healthy lifestyle. [] Progressing: [] Met: [x] Not Met: [] Adjusted     Progression Towards Functional goals:  [x] Patient is progressing as expected towards functional goals listed. [] Progression is slowed due to complexities listed. [] Progression has been slowed due to co-morbidities. [] Plan just implemented, too soon to assess goals progression  [] Other:     Overall Progression Towards Functional goals/ Treatment Progress Update:  [x] Patient is progressing as expected towards functional goals listed. [] Progression is slowed due to complexities/Impairments listed. [] Progression has been slowed due to co-morbidities.   [] Plan just implemented, too soon to assess goals progression <30days   [] Goals require adjustment due to lack of progress  [] Patient is not progressing as expected and requires additional follow up with physician  [] Other    Prognosis for POC: [x] Good [] Fair  [] Poor      Patient requires continued skilled intervention: [x] Yes  [] No    Treatment/Activity Tolerance:  [x] Patient able to complete treatment  [] Patient limited by fatigue  [] Patient limited by pain    [] Patient limited by other medical complications  [] Other:     ASSESSMENT: R post hip fatigue/muscle cramp following hip hikes in R stance. This resolved following repeat STM/CFM to glute med near sacral border. Held SLS DB reach and squats due to fatigue. Return to Play: (if applicable)   []  Stage 1: Intro to Strength   []  Stage 2: Return to Run and Strength   []  Stage 3: Return to Jump and Strength   []  Stage 4: Dynamic Strength and Agility   []  Stage 5: Sport Specific Training     []  Ready to Return to Play, Meets All Above Stages   []  Not Ready for Return to Sports   Comments:                         PLAN: See eval  [x] Continue per plan of care [] Alter current plan (see comments above)  [] Plan of care initiated [] Hold pending MD visit [] Discharge      Electronically signed by:  Alfonso Hensley PT    Note: If patient does not return for scheduled/ recommended follow up visits, this note will serve as a discharge from care along with most recent update on progress. Access Code: [email or web address]  URL: [email or web address]  Date: 06/30/2021  Prepared by: Alfonso Hensley    Exercises  Sidelying Thoracic Rotation with Open Book - 1-2 x daily - 7 x weekly - 1 sets - 5-10 reps - 5-10 seconds hold  Clamshell - 1-2 x daily - 7 x weekly - 2-3 sets - 10 reps  Supine Lower Trunk Rotation - 1-2 x daily - 7 x weekly - 1-2 sets - 10 reps - 5 seconds hold  Supine Bridge with Spinal Articulation - 1-2 x daily - 7 x weekly - 1-3 sets - 5-10 reps  Supine Active Straight Leg Raise - 1 x daily - 7 x weekly - 2-3 sets - 5-10 reps  Supine Piriformis Stretch with Foot on Ground - 1 x daily - 7 x weekly - 3-5 sets - 20 seconds hold  Wall Quarter Squat - 1 x daily - 7 x weekly - 1-3 sets - 10 reps  Wall Quarter Squat with Swiss Ball - 1 x daily - 7 x weekly - 1-3 sets - 8-10 reps  Standing Lumbar Spine Flexion Stretch Counter - 1 x daily - 7 x weekly - 3-5 sets - 10-15 seconds hold  Seated Thoracic Lumbar Extension with Pectoralis Stretch - 1 x daily - 7 x weekly - 1-2 sets - 5-10 reps  Swiss Ball March - 1 x daily - 7 x weekly - 2-3 sets - 10 reps  Swiss Ball Knee Extension - 1 x daily - 7 x weekly - 2-3 sets - 10 reps

## 2021-08-09 ENCOUNTER — TELEPHONE (OUTPATIENT)
Dept: ORTHOPEDIC SURGERY | Age: 56
End: 2021-08-09

## 2021-08-09 ENCOUNTER — OFFICE VISIT (OUTPATIENT)
Dept: ORTHOPEDIC SURGERY | Age: 56
End: 2021-08-09
Payer: COMMERCIAL

## 2021-08-09 ENCOUNTER — HOSPITAL ENCOUNTER (OUTPATIENT)
Dept: PHYSICAL THERAPY | Age: 56
Setting detail: THERAPIES SERIES
Discharge: HOME | End: 2021-08-09
Payer: COMMERCIAL

## 2021-08-09 VITALS — BODY MASS INDEX: 40.02 KG/M2 | WEIGHT: 212 LBS | HEIGHT: 61 IN

## 2021-08-09 DIAGNOSIS — M43.16 SPONDYLOLISTHESIS, LUMBAR REGION: ICD-10-CM

## 2021-08-09 DIAGNOSIS — M54.16 LUMBAR RADICULOPATHY: Primary | ICD-10-CM

## 2021-08-09 DIAGNOSIS — M53.3 SACROILIAC JOINT DYSFUNCTION OF RIGHT SIDE: ICD-10-CM

## 2021-08-09 DIAGNOSIS — M47.816 SPONDYLOSIS OF LUMBAR REGION WITHOUT MYELOPATHY OR RADICULOPATHY: ICD-10-CM

## 2021-08-09 PROCEDURE — 97110 THERAPEUTIC EXERCISES: CPT | Performed by: PHYSICAL THERAPIST

## 2021-08-09 PROCEDURE — 99213 OFFICE O/P EST LOW 20 MIN: CPT | Performed by: STUDENT IN AN ORGANIZED HEALTH CARE EDUCATION/TRAINING PROGRAM

## 2021-08-09 PROCEDURE — 97140 MANUAL THERAPY 1/> REGIONS: CPT | Performed by: PHYSICAL THERAPIST

## 2021-08-09 RX ORDER — CYCLOBENZAPRINE HCL 10 MG
10 TABLET ORAL NIGHTLY PRN
Qty: 30 TABLET | Refills: 0 | Status: SHIPPED | OUTPATIENT
Start: 2021-08-09 | End: 2021-09-08

## 2021-08-09 NOTE — PLAN OF CARE
The [address] [place],[address], [address] [facility], [address] [place], [place]    Physical Therapy Treatment Note/ Progress Report:           Date:  2021    Patient Name:  Elisa Shaffer  \"NAZANIN\"  :  1965  MRN: [id number]  Restrictions/Precautions:    Medical/Treatment Diagnosis Information:  · Diagnosis: M54.16 (ICD-10-CM) - Lumbar radiculopathy, M43.16 (ICD-10-CM) - Spondylolisthesis, lumbar region, M47.816 (ICD-10-CM) - Spondylosis of lumbar region without myelopathy or radiculopathy  · Treatment Diagnosis: M54.16 (ICD-10-CM) - Lumbar radiculopathy  Insurance/Certification information:  PT Insurance Information: LOB[employer] Mitchell w/ [employer] auth, $40 cp  Physician Information:  Referring Practitioner: VAZQUEZ Castillo  Has the plan of care been signed (Y/N):        [x]  Yes  []  No     Date of Patient follow up with Physician: 21      Is this a Progress Report:     [x]  Yes  []  No        If Yes:  Date Range for reporting period:  Beginnin21  Endin21    Progress report will be due (10 Rx or 30 days whichever is less):        Recertification will be due (POC Duration  / 90 days whichever is less): 21        Visit # Insurance Allowable Auth Required   In-person  Kelsea Duncan through 8/15/21 []  Yes []  No    Telehealth   []  Yes []  No    Total            Functional Scale: modODI 30%    Date assessed:  21    ModODI 34%       21    Therapy Diagnosis/Practice Pattern:F, conservative      Number of Comorbidities:  []0     [x]1-2    []3+    Latex Allergy:  [x]NO      []YES  Preferred Language for Healthcare:   [x]English       []other:      Pain level: eval 4/10   Current pain 4/10 average w/o meds    SUBJECTIVE:  Pt reports she has worked the past 2 days and the pain hasn't been too bad. She hasn't had to take any medication in several days.     OBJECTIVE: PN   Observation:    Test measurements:  MMT hip abd 4- R/4 L, hip ext 3+ B, knee ext 5 R/5 L, knee flex 5 R/5 L, lumbar flex fingertips to ankles but still pain with return though less UE assist needed, lumbar ext WNL w/ only mild end range pain    RESTRICTIONS/PRECAUTIONS: none    Exercises/Interventions:     Therapeutic Ex (05488) Sets/sec/reps Notes/CUES   SL open book S w/ top elbow bent HEP   SL clam w/ TA HEP   Supine LTR HEP   Supine bridge w/ spinal articulation  Bridge w/ alt march   x15 R/LHEP   Supine SLR w/ TA  HEP   Supine piriformis S foot on knee  HEP   Prone hip ext w/ knee flexed     Supine quad S   HEP        3D dynamic HS S  UE support on table   SLS w/ DB fwd reach (mod RDL) 3# x15 R/L Stability cues R stance, NV fatigued   Wall 1/4 squat, w/ SB  HEP   Standing lumbar flex S 5x15\" HEP   Standing hip abd, post-lat kick  Posture cues   Seated SB march, kick  Seated SB TB row    HEP   Self thoracic mob over chair  HEP    SWB seated row  SWB seated TB ext, LPD  TB anti-rotation press         SWB prone hip ext  x20 R/L Air-ex pad for transition   FSU  LSD  Hip hike     2\" x10 R/L Light UE support, hip cues  R hip cramp after   Mini squat w/ MB grab from stool 10# x15    TB hip ext from front to neutral (tendu) YTB x15 R/L Light UE support        Pt ed: , , ad, , , , , lumbar support     Manual Intervention (70815)     STM L>R PSM and post hip  ART R piriformis  SL sacral distraction   Supine HS S, piriformis S R/L x14'        2x30\" ea     Prone thoracic rotation mobs T4-T9, PA's T4-5-6 gr lll-lV     R LE inf mob, lat glide                    NMR re-education (66322)  CUES NEEDED                                                Therapeutic Activity (83123)                                       Therapeutic Exercise and NMR EXR  [x] (89590) Provided verbal/tactile cueing for activities related to strengthening, flexibility, endurance, ROM  for improvements in proximal hip and core control with self care, mobility, lifting and ambulation.   [x] (97297) Provided verbal/tactile cueing for activities related to improving balance, coordination, kinesthetic sense, posture, motor skill, proprioception  to assist with core control in self care, mobility, lifting, and ambulation. Therapeutic Activities:    [] (15236 or 39433) Provided verbal/tactile cueing for activities related to improving balance, coordination, kinesthetic sense, posture, motor skill, proprioception and motor activation to allow for proper function  with self care and ADLs  [] (38121) Provided training and instruction to the patient for proper core and proximal hip recruitment and positioning with ambulation re-education     Home Exercise Program:    [x] (87756) Reviewed/Progressed HEP activities related to strengthening, flexibility, endurance, ROM of core, proximal hip and LE for functional self-care, mobility, lifting and ambulation   [] (96807) Reviewed/Progressed HEP activities related to improving balance, coordination, kinesthetic sense, posture, motor skill, proprioception of core, proximal hip and LE for self care, mobility, lifting, and ambulation      Manual Treatments:  PROM / STM / Oscillations-Mobs:  G-I, II, III, IV (PA's, Inf., Post.)  [x] (45300) Provided manual therapy to mobilize proximal hip and LS spine soft tissue/joints for the purpose of modulating pain, promoting relaxation,  increasing ROM, reducing/eliminating soft tissue swelling/inflammation/restriction, improving soft tissue extensibility and allowing for proper ROM for normal function with self care, mobility, lifting and ambulation.      Modalities:   MHP in L SL x15'    Charges  Timed Code Treatment Minutes: 39'   Total Treatment Minutes: 61'     [] EVAL (LOW) 70425   [] EVAL (MOD) 35501   [] EVAL (HIGH) 84443   [] RE-EVAL     [x] YZ(24078) x 2   [] IONTO  [] NMR (09435) x     [] VASO  [x] Manual (13631) x 1     [] Other:  [] TA x      [] Mech Traction (11881)  [] ES(attended) (85638)      [] ES (un) (62474):     Goals:   Patient stated goal: able to resume exercising without pain  [x] Progressing: [] Met: [] Not Met: [] Adjusted    Therapist goals for Patient:   Short Term Goals: To be achieved in: 2 weeks  1. Independent in HEP and progression per patient tolerance, in order to prevent re-injury. [] Progressing: [x] Met: [] Not Met: [] Adjusted  2. Patient will have a decrease in pain to facilitate improvement in movement, function, and ADLs as indicated by Functional Deficits. [] Progressing: [x] Met: [] Not Met: [] Adjusted      Long Term Goals: To be achieved by: 9/8/21  1. Disability index score of 15% or less for the modODI  to assist with reaching prior level of function. [] Progressing: [] Met: [x] Not Met: [] Adjusted  2. Patient will demonstrate increased AROM to WNL, good LS mobility, good hip ROM to allow for proper joint functioning as indicated by patients Functional Deficits. [x] Progressing: [] Met: [] Not Met: [] Adjusted  3. Patient will demonstrate an increase in Strength to good proximal hip and core activation to allow for proper functional mobility as indicated by patients Functional Deficits. [x] Progressing: [] Met: [] Not Met: [] Adjusted  4. Patient will return to lifting 20# floor to waist without increased symptoms or restriction. [x] Progressing: [] Met: [] Not Met: [] Adjusted  5. Pt will be indep with return to safe gym routine for healthy lifestyle. [x] Progressing: [] Met: [] Not Met: [] Adjusted     Progression Towards Functional goals:  [x] Patient is progressing as expected towards functional goals listed. [] Progression is slowed due to complexities listed. [] Progression has been slowed due to co-morbidities. [] Plan just implemented, too soon to assess goals progression  [] Other:     Overall Progression Towards Functional goals/ Treatment Progress Update:  [x] Patient is progressing as expected towards functional goals listed. [] Progression is slowed due to complexities/Impairments listed.   [] Progression has been slowed due to co-morbidities. [] Plan just implemented, too soon to assess goals progression <30days   [] Goals require adjustment due to lack of progress  [] Patient is not progressing as expected and requires additional follow up with physician  [] Other    Prognosis for POC: [x] Good [] Fair  [] Poor      Patient requires continued skilled intervention: [x] Yes  [] No    Treatment/Activity Tolerance:  [x] Patient able to complete treatment  [] Patient limited by fatigue  [] Patient limited by pain    [] Patient limited by other medical complications  [] Other:     ASSESSMENT: pt is making slow, but steady progress with AROM, strength and function. R>L hip ext and abduction weakness, likely adding to ongoing c/o LBP. Pt reports minimal if any of the initial radicular pain and numbness since starting PT. She will benefit from continued manual skills for soft tissue release and continued posture, core and hip strength. Return to Play: (if applicable)   []  Stage 1: Intro to Strength   []  Stage 2: Return to Run and Strength   []  Stage 3: Return to Jump and Strength   []  Stage 4: Dynamic Strength and Agility   []  Stage 5: Sport Specific Training     []  Ready to Return to Play, Meets All Above Stages   []  Not Ready for Return to Sports   Comments:                         PLAN: See eval  [x] Continue per plan of care [] Alter current plan (see comments above)  [] Plan of care initiated [] Hold pending MD visit [] Discharge      Electronically signed by:  Alfonso Hensley PT    Note: If patient does not return for scheduled/ recommended follow up visits, this note will serve as a discharge from care along with most recent update on progress. Access Code: [email or web address]  URL: [email or web address]. [email or web address]  Date: 06/30/2021  Prepared by: Alfonso Hensley    Exercises  Sidelying Thoracic Rotation with Open Book - 1-2 x daily - 7 x weekly - 1 sets - 5-10 reps - 5-10 seconds hold  Clamshell - 1-2 x daily - 7 x weekly - 2-3 sets - 10 reps  Supine Lower Trunk Rotation - 1-2 x daily - 7 x weekly - 1-2 sets - 10 reps - 5 seconds hold  Supine Bridge with Spinal Articulation - 1-2 x daily - 7 x weekly - 1-3 sets - 5-10 reps  Supine Active Straight Leg Raise - 1 x daily - 7 x weekly - 2-3 sets - 5-10 reps  Supine Piriformis Stretch with Foot on Ground - 1 x daily - 7 x weekly - 3-5 sets - 20 seconds hold  Wall Quarter Squat - 1 x daily - 7 x weekly - 1-3 sets - 10 reps  Wall Quarter Squat with Swiss Ball - 1 x daily - 7 x weekly - 1-3 sets - 8-10 reps  Standing Lumbar Spine Flexion Stretch Counter - 1 x daily - 7 x weekly - 3-5 sets - 10-15 seconds hold  Seated Thoracic Lumbar Extension with Pectoralis Stretch - 1 x daily - 7 x weekly - 1-2 sets - 5-10 reps  Swiss Ball March - 1 x daily - 7 x weekly - 2-3 sets - 10 reps  Swiss Ball Knee Extension - 1 x daily - 7 x weekly - 2-3 sets - 10 reps

## 2021-08-09 NOTE — PROGRESS NOTES
Follow up: Marina Hendrix  1965  [id number]      CHIEF COMPLAINT:    Chief Complaint   Patient presents with    Follow-up     LUMBAR, pt notes on some of her bad days she has some pain radiating to her right hip. reports to taking the naprosyn PRN. pt notes work is okay, has pn with prolonged sitting and putting away supplies. pt is a health unit coordinator. at [employer]         HISTORY OF PRESENT ILLNESS:  Ms. Vandana Maza is a 63 y.o. female returns for a follow up visit for multiple medical problems. Her current presenting problems are   1. Lumbar radiculopathy    2. Spondylolisthesis, lumbar region    3. Spondylosis of lumbar region without myelopathy or radiculopathy    4. Sacroiliac joint dysfunction of right side    . As per information/history obtained from the PADT(patient assessment and documentation tool) - She complains of pain in the lower back with radiation to the buttocks and upper leg Right She rates the pain 4/10 and describes it as aching. Pain is made worse by: sitting, lying down. She denies side effects from the current pain regimen. Patient reports that since the last follow up visit the physical functioning is better, family/social relationships are unchanged, mood is unchanged and sleep patterns are worse, and that the overall functioning is better. Patient denies neurological bowel or bladder. The patient presents for follow up of ongoing low back and right leg pain. She has been in physical therapy and has continued naprosyn as needed. She has seen some improvement of pain with physical therapy however she continues to have lower back and right buttock pain with sitting and lying down. Pain sometimes radiates down the posterior aspect of the right leg to the proximal hamstring. She has attended 11 visits of PT so far from 6/15/21-8/9-21.      Associated signs and symptoms:   Neurogenic bowel or bladder symptoms:  no   Perceived weakness:  no   Difficulty walking:  no Past Medical History:   Past Medical History:   Diagnosis Date    Arthritis     Hyperlipidemia     Vitamin D deficiency       Past Surgical History:     Past Surgical History:   Procedure Laterality Date     SECTION  1991    EYE SURGERY Bilateral     EYE SURGERY      FOOT SURGERY Right     MANDIBLE SURGERY  1983    TONSILLECTOMY      TUMOR REMOVAL       Current Medications:     Current Outpatient Medications:     cyclobenzaprine (FLEXERIL) 10 MG tablet, Take 1 tablet by mouth nightly as needed for Muscle spasms, Disp: 30 tablet, Rfl: 0    Cholecalciferol (VITAMIN D3) 50 MCG (2000 UT) CAPS, Take 1 capsule by mouth daily, Disp: 30 capsule, Rfl: 3    naproxen (NAPROSYN) 500 MG tablet, Take 1 tablet by mouth 2 times daily as needed for Pain, Disp: 20 tablet, Rfl: 0    predniSONE (DELTASONE) 10 MG tablet, Take 1 tablet by mouth daily [id number]; [id number]; 7fwnxLLXAUw8vina; 1pillPOQD (Patient not taking: Reported on 2021), Disp: 26 tablet, Rfl: 0    lidocaine (LIDODERM) 5 %, Place 1 patch onto the skin daily 12 hours on, 12 hours off., Disp: 12 patch, Rfl: 0    senna (SENOKOT) 8.6 MG tablet, Take 1 tablet by mouth daily, Disp: , Rfl:     NONFORMULARY, BIOMED COMPOUND FOR KNEE, Disp: , Rfl:     triamcinolone (KENALOG) 0.025 % cream, APPLY TOPICALLY TO RASH TWO TIMES A DAY AS NEEDED, Disp: 1 g, Rfl: 1    Multiple Vitamins-Minerals (THERAPEUTIC MULTIVITAMIN-MINERALS) tablet, Take 1 tablet by mouth daily, Disp: , Rfl:     Chromium Picolinate 200 MCG CAPS, Take by mouth 2 times daily , Disp: , Rfl:   Allergies:  Sulfa antibiotics  Social History:    reports that she has never smoked. She has never used smokeless tobacco. She reports current alcohol use. She reports that she does not use drugs.   Family History:   Family History   Problem Relation Age of Onset    High Blood Pressure Mother     Stroke Brother     Diabetes Maternal Grandfather     Diabetes Other NEPHEW    Heart Disease Other     Cancer Other     Breast Cancer Other        REVIEW OF SYSTEMS:   CONSTITUTIONAL: Denies unexplained weight loss, fevers, chills or fatigue  NEUROLOGICAL: Denies unsteady gait or progressive weakness  MUSCULOSKELETAL: Denies joint swelling or redness  GI: Denies nausea, vomiting, diarrhea   : Denies bowel or bladder issues       PHYSICAL EXAM:    Vitals: Height 5' 1\" (1.549 m), weight 212 lb (96.2 kg), not currently breastfeeding. GENERAL EXAM:  · General Apparence: Patient is adequately groomed with no evidence of malnutrition. · Psychiatric: Orientation: The patient is oriented to time, place and person. The patient's mood and affect are appropriate   · Vascular: Examination reveals no swelling and palpation reveals no tenderness in upper or lower extremities. Good capillary refill. · The lymphatic examination of the neck, axillae and groin reveals all areas to be without enlargement or induration  · Sensation is intact without deficit in the upper and lower extremities to light touch and pinprick  · Coordination of the upper and lower extremities are normal.  · RIGHT UPPER EXTREMITY:  Inspection/examination of the right upper extremity does not show any tenderness, deformity or injury. Range of motion is unremarkable and pain-free. There is no gross instability. There are no rashes, ulcerations or lesions. Strength and tone are normal. No atrophy or abnormal movements are noted. · LEFT UPPER EXTREMITY: Inspection/examination of the left upper extremity does not show any tenderness, deformity or injury. Range of motion is unremarkable and pain-free. There is no gross instability. There are no rashes, ulcerations or lesions. Strength and tone are normal. No atrophy or abnormal movements are noted. LUMBAR/SACRAL EXAMINATION:  · Inspection: Local inspection shows no step-off or bruising. Lumbar alignment is normal. No instability is noted.   · Palpation:   No Urine Negative Negative    Leukocyte Esterase, Urine TRACE (A) Negative    Microscopic Examination YES     Urine Type Cleancatch     Urine Reflex to Culture Not Indicated    Lipid Panel   Result Value Ref Range    Cholesterol, Total 188 0 - 199 mg/dL    Triglycerides 56 0 - 150 mg/dL    HDL 59 40 - 60 mg/dL    LDL Calculated 118 (H) <100 mg/dL    VLDL Cholesterol Calculated 11 Not Established mg/dL   Hemoglobin A1C   Result Value Ref Range    Hemoglobin A1C 5.6 See comment %    eAG 114.0 mg/dL   Vitamin D 25 Hydroxy   Result Value Ref Range    Vit D, 25-Hydroxy 33.1 >=30 ng/mL   COMPREHENSIVE METABOLIC PANEL   Result Value Ref Range    Sodium 141 136 - 145 mmol/L    Potassium 4.4 3.5 - 5.1 mmol/L    Chloride 106 99 - 110 mmol/L    CO2 26 21 - 32 mmol/L    Anion Gap 9 3 - 16    Glucose 74 70 - 99 mg/dL    BUN 12 7 - 20 mg/dL    CREATININE 0.6 0.6 - 1.1 mg/dL    GFR Non-African American >60 >60    GFR African American >60 >60    Calcium 9.4 8.3 - 10.6 mg/dL    Total Protein 6.5 6.4 - 8.2 g/dL    Albumin 4.1 3.4 - 5.0 g/dL    Albumin/Globulin Ratio 1.7 1.1 - 2.2    Total Bilirubin 0.4 0.0 - 1.0 mg/dL    Alkaline Phosphatase 36 (L) 40 - 129 U/L    ALT 24 10 - 40 U/L    AST 33 15 - 37 U/L    Globulin 2.4 g/dL   Microscopic Urinalysis   Result Value Ref Range    Mucus, UA 1+ (A) None Seen /LPF    RBC, UA 0-2 0 - 4 /HPF    Bacteria, UA 4+ (A) None Seen /HPF    Urinalysis Comments see below     Hyaline Casts, UA 3 0 - 8 /LPF    WBC, UA 2 0 - 5 /HPF    Epithelial Cells, UA 8 (H) 0 - 5 /HPF     Impression:       1. Lumbar radiculopathy    2. Spondylolisthesis, lumbar region    3. Spondylosis of lumbar region without myelopathy or radiculopathy    4. Sacroiliac joint dysfunction of right side        Plan:  Clinical Course: shows no change    I discussed the diagnosis and the treatment options with Nini Su today.      In Summary:  The various treatment options were outlined and discussed with Nini Su including:  Conservative care options: physical therapy, ice, medications, bracing, and activity modification. The indications for therapeutic injections. The indications for additional imaging/laboratory studies. The indications for (possible future) interventions. After considering the various options discussed, Saint Maya elected to pursue a course of treatment that includes the followin. Medications:  I will add a flexeril 10  mg  to the current regimen. Counseled on risks, benefits and alternatives and recommended not to take the medicine and drive or operate heavy machinery. 2. PT:  Encouraged to continue with HEP. 3. Further studies:  I will obtain a Lumbar MR without contrast to evaluate for soft tissue pathology or stenosis contributing to the back pain and paresthesias. 4. Interventional:  After further imaging is obtained, interventional options will be reviewed and recommended. Discussed PHILIP vs SI joint injection. 5. Follow up:  1-2 weeks      Saint Maya was instructed to call the office if her symptoms worsen or if new symptoms appear prior to the next scheduled visit. She is specifically instructed to contact the office between now & her scheduled appointment if she has concerns related to her condition or if she needs assistance in scheduling the above tests. She is welcome to call for an appointment sooner if she has any additional concerns or questions. Daniel Rodriguez PA-C   Board Certified by the [employer] on Certification of [address] and Orthopaedics                This dictation was performed with a verbal recognition program [facility]) and it was checked for errors. It is possible that there are still dictated errors within this office note. If so, please bring any errors to my attention for an addendum. All efforts were made to ensure that this office note is accurate.

## 2021-08-11 ENCOUNTER — HOSPITAL ENCOUNTER (OUTPATIENT)
Dept: PHYSICAL THERAPY | Age: 56
Setting detail: THERAPIES SERIES
Discharge: HOME | End: 2021-08-11
Payer: COMMERCIAL

## 2021-08-11 PROCEDURE — 97140 MANUAL THERAPY 1/> REGIONS: CPT | Performed by: PHYSICAL THERAPIST

## 2021-08-11 PROCEDURE — 97110 THERAPEUTIC EXERCISES: CPT | Performed by: PHYSICAL THERAPIST

## 2021-08-11 NOTE — PLAN OF CARE
[facility] [address], [address] [facility], [address] [place], [place]    Physical Therapy Treatment Note/ Progress Report:           Date:  2021    Patient Name:  Milton Wakefield  \"NAZANIN\"  :  1965  MRN: [id number]  Restrictions/Precautions:    Medical/Treatment Diagnosis Information:  · Diagnosis: M54.16 (ICD-10-CM) - Lumbar radiculopathy, M43.16 (ICD-10-CM) - Spondylolisthesis, lumbar region, M47.816 (ICD-10-CM) - Spondylosis of lumbar region without myelopathy or radiculopathy  · Treatment Diagnosis: M54.16 (ICD-10-CM) - Lumbar radiculopathy  Insurance/Certification information:  PT Insurance Information: LOB[employer] w/ [employer] auth, $40 cp  Physician Information:  Referring Practitioner: VAZQUEZ Ferrara  Has the plan of care been signed (Y/N):        [x]  Yes  []  No     Date of Patient follow up with Physician: 21      Is this a Progress Report:     [x]  Yes  []  No        If Yes:  Date Range for reporting period:  Beginnin21  Endin21    Progress report will be due (10 Rx or 30 days whichever is less): 55       Recertification will be due (POC Duration  / 90 days whichever is less): 21        Visit # Insurance Allowable Auth Required   In-person  Quantum auth through 8/15/21 []  Yes []  No    Telehealth   []  Yes []  No    Total            Functional Scale: modODI 30%    Date assessed:  21    ModODI 34%       21    Therapy Diagnosis/Practice Pattern:F, conservative      Number of Comorbidities:  []0     [x]1-2    []3+    Latex Allergy:  [x]NO      []YES  Preferred Language for Healthcare:   [x]English       []other:      Pain level: eval 4/10   Current pain 4/10 average w/o meds    SUBJECTIVE:  Pt reports she had her f/u w/ Danita Shilohaimee and she is being sent for an MRI. Approved, but she has not been called to schedule. She will call [facility] tomorrow if she still hasn't heard from them.     OBJECTIVE: request more visits NV   Observation:    Test measurements:      RESTRICTIONS/PRECAUTIONS: none    Exercises/Interventions:     Therapeutic Ex (24079) Sets/sec/reps Notes/CUES   SL open book S w/ top elbow bent HEP   SL clam w/ TA HEP   Supine LTR HEP   Supine bridge w/ spinal articulation  Bridge w/ alt march   HEP   Supine SLR w/ TA  HEP   Supine piriformis S foot on knee  HEP   Prone hip ext w/ knee flexed     Supine quad S   HEP        3D dynamic HS S  UE support on table   SLS w/ DB fwd reach (mod RDL) 3# x15 R/L Stability cues R stance, NV fatigued   1/4 squat, w/ SB w/ wt lift x10 5# HEP   Standing lumbar flex S 5x15\" HEP   Standing hip abd, post-lat kick  Posture cues   Seated SB march, kick  Seated SB TB row    HEP   Self thoracic mob over chair  HEP    SWB seated row  SWB seated TB ext, LPD  TB anti-rotation press  SWB LT pull, HAB       RTB 2x10 ea    SWB prone hip ext  x20 R/L Air-ex pad for transition   FSU  LSD  Hip hike     2\" x10 R/L Light UE support, hip cues  R hip cramp after   Mini squat w/ MB grab from stool    TB hip ext from front to neutral (tendu)  Light UE support        Self distraction 5x20\"    Pt ed: , , ad, , , , , lumbar support     Manual Intervention (09888)     STM L>R PSM and post hip  ART R piriformis  SL sacral distraction   Supine HS S, piriformis S R/L x14'        2x30\" ea     Prone thoracic rotation mobs T4-T9, PA's T4-5-6 gr [id number]     R LE inf mob, lat glide                    NMR re-education (69851)  CUES NEEDED                                                Therapeutic Activity (66902)                                       Therapeutic Exercise and NMR EXR  [x] (88765) Provided verbal/tactile cueing for activities related to strengthening, flexibility, endurance, ROM  for improvements in proximal hip and core control with self care, mobility, lifting and ambulation.   [x] (75869) Provided verbal/tactile cueing for activities related to improving balance, coordination, kinesthetic sense, posture, motor skill, proprioception  to assist with core control in self care, mobility, lifting, and ambulation. Therapeutic Activities:    [] (67205 or 82158) Provided verbal/tactile cueing for activities related to improving balance, coordination, kinesthetic sense, posture, motor skill, proprioception and motor activation to allow for proper function  with self care and ADLs  [] (62220) Provided training and instruction to the patient for proper core and proximal hip recruitment and positioning with ambulation re-education     Home Exercise Program:    [x] (92191) Reviewed/Progressed HEP activities related to strengthening, flexibility, endurance, ROM of core, proximal hip and LE for functional self-care, mobility, lifting and ambulation   [] (48388) Reviewed/Progressed HEP activities related to improving balance, coordination, kinesthetic sense, posture, motor skill, proprioception of core, proximal hip and LE for self care, mobility, lifting, and ambulation      Manual Treatments:  PROM / STM / Oscillations-Mobs:  G-I, II, III, IV (PA's, Inf., Post.)  [x] (47437) Provided manual therapy to mobilize proximal hip and LS spine soft tissue/joints for the purpose of modulating pain, promoting relaxation,  increasing ROM, reducing/eliminating soft tissue swelling/inflammation/restriction, improving soft tissue extensibility and allowing for proper ROM for normal function with self care, mobility, lifting and ambulation.      Modalities:   CP in L SL x15'    Charges  Timed Code Treatment Minutes: 39'   Total Treatment Minutes: 61'     [] EVAL (LOW) 79818   [] EVAL (MOD) 82596   [] EVAL (HIGH) 64828   [] RE-EVAL     [x] HQ(73778) x 2   [] IONTO  [] NMR (72934) x     [] VASO  [x] Manual (13123) x 1     [] Other:  [] TA x      [] Mech Traction (02997)  [] ES(attended) (42674)      [] ES (un) (13134):     Goals:   Patient stated goal: able to resume exercising without pain  [x] Progressing: [] Met: [] Not Met: [] Adjusted    Therapist goals for Patient:   Short Term Goals: To be achieved in: 2 weeks  1. Independent in HEP and progression per patient tolerance, in order to prevent re-injury. [] Progressing: [x] Met: [] Not Met: [] Adjusted  2. Patient will have a decrease in pain to facilitate improvement in movement, function, and ADLs as indicated by Functional Deficits. [] Progressing: [x] Met: [] Not Met: [] Adjusted      Long Term Goals: To be achieved by: 9/8/21  1. Disability index score of 15% or less for the modODI  to assist with reaching prior level of function. [] Progressing: [] Met: [x] Not Met: [] Adjusted  2. Patient will demonstrate increased AROM to WNL, good LS mobility, good hip ROM to allow for proper joint functioning as indicated by patients Functional Deficits. [x] Progressing: [] Met: [] Not Met: [] Adjusted  3. Patient will demonstrate an increase in Strength to good proximal hip and core activation to allow for proper functional mobility as indicated by patients Functional Deficits. [x] Progressing: [] Met: [] Not Met: [] Adjusted  4. Patient will return to lifting 20# floor to waist without increased symptoms or restriction. [x] Progressing: [] Met: [] Not Met: [] Adjusted  5. Pt will be indep with return to safe gym routine for healthy lifestyle. [x] Progressing: [] Met: [] Not Met: [] Adjusted     Progression Towards Functional goals:  [x] Patient is progressing as expected towards functional goals listed. [] Progression is slowed due to complexities listed. [] Progression has been slowed due to co-morbidities. [] Plan just implemented, too soon to assess goals progression  [] Other:     Overall Progression Towards Functional goals/ Treatment Progress Update:  [x] Patient is progressing as expected towards functional goals listed. [] Progression is slowed due to complexities/Impairments listed. [] Progression has been slowed due to co-morbidities.   [] Plan just implemented, too soon to assess goals progression <30days   [] Goals require adjustment due to lack of progress  [] Patient is not progressing as expected and requires additional follow up with physician  [] Other    Prognosis for POC: [x] Good [] Fair  [] Poor      Patient requires continued skilled intervention: [x] Yes  [] No    Treatment/Activity Tolerance:  [x] Patient able to complete treatment  [] Patient limited by fatigue  [] Patient limited by pain    [] Patient limited by other medical complications  [] Other:     ASSESSMENT: continued soreness across LB entering clinic. Trial of LAD and self traction holding table with pt reporting good stretch during. Progressed SB wall squats with addition of DB lift for progressed core strength. Decreased R hip fatigue c/o's this session. Return to Play: (if applicable)   []  Stage 1: Intro to Strength   []  Stage 2: Return to Run and Strength   []  Stage 3: Return to Jump and Strength   []  Stage 4: Dynamic Strength and Agility   []  Stage 5: Sport Specific Training     []  Ready to Return to Play, Meets All Above Stages   []  Not Ready for Return to Sports   Comments:                         PLAN: req more visits  [x] Continue per plan of care [] Alter current plan (see comments above)  [] Plan of care initiated [] Hold pending MD visit [] Discharge      Electronically signed by:  Elsa Jerome PT    Note: If patient does not return for scheduled/ recommended follow up visits, this note will serve as a discharge from care along with most recent update on progress. Access Code: [email or web address]  URL: [email or web address]  Date: 06/30/2021  Prepared by: Elsa Jerome    Exercises  Sidelying Thoracic Rotation with Open Book - 1-2 x daily - 7 x weekly - 1 sets - 5-10 reps - 5-10 seconds hold  Clamshell - 1-2 x daily - 7 x weekly - 2-3 sets - 10 reps  Supine Lower Trunk Rotation - 1-2 x daily - 7 x weekly - 1-2 sets - 10 reps - 5 seconds hold  Supine Bridge with Spinal Articulation - 1-2 x daily - 7 x weekly - 1-3 sets - 5-10 reps  Supine Active Straight Leg Raise - 1 x daily - 7 x weekly - 2-3 sets - 5-10 reps  Supine Piriformis Stretch with Foot on Ground - 1 x daily - 7 x weekly - 3-5 sets - 20 seconds hold  Wall Quarter Squat - 1 x daily - 7 x weekly - 1-3 sets - 10 reps  Wall Quarter Squat with Swiss Ball - 1 x daily - 7 x weekly - 1-3 sets - 8-10 reps  Standing Lumbar Spine Flexion Stretch Counter - 1 x daily - 7 x weekly - 3-5 sets - 10-15 seconds hold  Seated Thoracic Lumbar Extension with Pectoralis Stretch - 1 x daily - 7 x weekly - 1-2 sets - 5-10 reps  Swiss Ball March - 1 x daily - 7 x weekly - 2-3 sets - 10 reps  Swiss Ball Knee Extension - 1 x daily - 7 x weekly - 2-3 sets - 10 reps

## 2021-08-18 ENCOUNTER — TELEPHONE (OUTPATIENT)
Dept: ORTHOPEDIC SURGERY | Age: 56
End: 2021-08-18

## 2021-08-18 NOTE — TELEPHONE ENCOUNTER
VM patient in regards to scheduling a follow up visit for patients MRI LUMBAR  test results. Results will not be given over the phone and require an office visit for discussion. Please schedule an office visit with VAZQUEZ Maciel. Our office number is [phone].

## 2021-08-24 ENCOUNTER — OFFICE VISIT (OUTPATIENT)
Dept: ORTHOPEDIC SURGERY | Age: 56
End: 2021-08-24
Payer: COMMERCIAL

## 2021-08-24 VITALS — WEIGHT: 210 LBS | BODY MASS INDEX: 39.65 KG/M2 | HEIGHT: 61 IN

## 2021-08-24 DIAGNOSIS — M54.16 LUMBAR RADICULOPATHY: Primary | ICD-10-CM

## 2021-08-24 DIAGNOSIS — M51.26 HNP (HERNIATED NUCLEUS PULPOSUS), LUMBAR: ICD-10-CM

## 2021-08-24 DIAGNOSIS — M48.061 LUMBAR FORAMINAL STENOSIS: ICD-10-CM

## 2021-08-24 PROCEDURE — 99213 OFFICE O/P EST LOW 20 MIN: CPT | Performed by: STUDENT IN AN ORGANIZED HEALTH CARE EDUCATION/TRAINING PROGRAM

## 2021-08-24 NOTE — PROGRESS NOTES
Follow up: Ernestine Junior  1965  [id number]      CHIEF COMPLAINT:    Chief Complaint   Patient presents with    Follow-up     FU LUMBAR MRI TR, pt unchanged         HISTORY OF PRESENT ILLNESS:  Ms. Erwin Otoole is a 63 y.o. female returns for a follow up visit for multiple medical problems. Her current presenting problems are   1. Lumbar radiculopathy    2. HNP (herniated nucleus pulposus), lumbar    3. Lumbar foraminal stenosis    . As per information/history obtained from the PADT(patient assessment and documentation tool) - She complains of pain in the lower back with radiation to the buttocks and upper leg Right She rates the pain 5/10 and describes it as aching. Pain is made worse by: sitting, lying down. She denies side effects from the current pain regimen. Patient reports that since the last follow up visit the physical functioning is unchanged, family/social relationships are unchanged, mood is unchanged and sleep patterns are unchanged, and that the overall functioning is unchanged. Patient denies neurological bowel or bladder. The patient presents for follow-up of ongoing low back and right leg pain. She has undergone 12 visits of physical therapy since 6/21/2021. She continues to complain of low back pain with radiation to the right buttock and proximal hamstring. Pain is worse with sitting and lying down. She has relief of pain with standing. She has continued taking Naprosyn as needed for pain which helps. She had good temporary relief with a prednisone taper in June. She also utilizes Flexeril as needed at night. She can sit for an hour, stand for an hour and walk for 30 to 40 minutes with minimal to no symptoms.       Associated signs and symptoms:   Neurogenic bowel or bladder symptoms:  no   Perceived weakness:  no   Difficulty walking:  no              Past Medical History:   Past Medical History:   Diagnosis Date    Arthritis     Hyperlipidemia     Vitamin D deficiency Past Surgical History:     Past Surgical History:   Procedure Laterality Date     SECTION      EYE SURGERY Bilateral     EYE SURGERY      FOOT SURGERY Right     MANDIBLE SURGERY      TONSILLECTOMY      TUMOR REMOVAL  1965     Current Medications:     Current Outpatient Medications:     cyclobenzaprine (FLEXERIL) 10 MG tablet, Take 1 tablet by mouth nightly as needed for Muscle spasms, Disp: 30 tablet, Rfl: 0    Cholecalciferol (VITAMIN D3) 50 MCG (2000 UT) CAPS, Take 1 capsule by mouth daily, Disp: 30 capsule, Rfl: 3    naproxen (NAPROSYN) 500 MG tablet, Take 1 tablet by mouth 2 times daily as needed for Pain, Disp: 20 tablet, Rfl: 0    predniSONE (DELTASONE) 10 MG tablet, Take 1 tablet by mouth daily [id number]; [id number]; [id number]; [id number] (Patient not taking: Reported on 2021), Disp: 26 tablet, Rfl: 0    lidocaine (LIDODERM) 5 %, Place 1 patch onto the skin daily 12 hours on, 12 hours off., Disp: 12 patch, Rfl: 0    senna (SENOKOT) 8.6 MG tablet, Take 1 tablet by mouth daily, Disp: , Rfl:     NONFORMULARY, BIOMED COMPOUND FOR KNEE, Disp: , Rfl:     triamcinolone (KENALOG) 0.025 % cream, APPLY TOPICALLY TO RASH TWO TIMES A DAY AS NEEDED, Disp: 1 g, Rfl: 1    Multiple Vitamins-Minerals (THERAPEUTIC MULTIVITAMIN-MINERALS) tablet, Take 1 tablet by mouth daily, Disp: , Rfl:     Chromium Picolinate 200 MCG CAPS, Take by mouth 2 times daily , Disp: , Rfl:   Allergies:  Sulfa antibiotics  Social History:    reports that she has never smoked. She has never used smokeless tobacco. She reports current alcohol use. She reports that she does not use drugs.   Family History:   Family History   Problem Relation Age of Onset    High Blood Pressure Mother     Stroke Brother     Diabetes Maternal Grandfather     Diabetes Other         NEPHEW    Heart Disease Other     Cancer Other     Breast Cancer Other        REVIEW OF SYSTEMS:   CONSTITUTIONAL: Denies unexplained weight loss, fevers, chills or fatigue  NEUROLOGICAL: Denies unsteady gait or progressive weakness  MUSCULOSKELETAL: Denies joint swelling or redness  GI: Denies nausea, vomiting, diarrhea   : Denies bowel or bladder issues       PHYSICAL EXAM:    Vitals: Height 5' 1\" (1.549 m), weight 210 lb (95.3 kg), not currently breastfeeding. GENERAL EXAM:  · General Apparence: Patient is adequately groomed with no evidence of malnutrition. · Psychiatric: Orientation: The patient is oriented to time, place and person. The patient's mood and affect are appropriate   · Vascular: Examination reveals no swelling and palpation reveals no tenderness in upper or lower extremities. Good capillary refill. · The lymphatic examination of the neck, axillae and groin reveals all areas to be without enlargement or induration  · Sensation is intact without deficit in the upper and lower extremities to light touch and pinprick  · Coordination of the upper and lower extremities are normal.  · RIGHT UPPER EXTREMITY:  Inspection/examination of the right upper extremity does not show any tenderness, deformity or injury. Range of motion is unremarkable and pain-free. There is no gross instability. There are no rashes, ulcerations or lesions. Strength and tone are normal. No atrophy or abnormal movements are noted. · LEFT UPPER EXTREMITY: Inspection/examination of the left upper extremity does not show any tenderness, deformity or injury. Range of motion is unremarkable and pain-free. There is no gross instability. There are no rashes, ulcerations or lesions. Strength and tone are normal. No atrophy or abnormal movements are noted. LUMBAR/SACRAL EXAMINATION:  · Inspection: Local inspection shows no step-off or bruising. Lumbar alignment is normal. No instability is noted. · Palpation:   No evidence of tenderness at the midline.   Lumbar paraspinal tenderness: Mild L4/5 and L5/S1 tenderness  Bursal tenderness No tenderness bilaterally  There is no paraspinal spasm. · Range of Motion: limited by 25% in all planes due to pain  · Strength:   Strength testing is 5/5 in all muscle groups tested. · Special Tests:   Straight leg raise positive right and crossed SLR negative. Herbert's testing is negative bilaterally. FADIR's testing is negative bilaterally. · Skin: There are no rashes, ulcerations or lesions. · Reflexes: Reflexes are symmetrically 2+ at the patellar and ankle tendons. Clonus absent bilaterally at the feet. · Gait & station: normal, patient ambulates without assistance  · Additional Examinations:  · RIGHT LOWER EXTREMITY: Inspection/examination of the right lower extremity does not show any tenderness, deformity or injury. Range of motion is normal and pain-free. There is no gross instability. There are no rashes, ulcerations or lesions. Strength and tone are normal. No atrophy or abnormal movements are noted. · LEFT LOWER EXTREMITY:  Inspection/examination of the left lower extremity does not show any tenderness, deformity or injury. Range of motion is normal and pain-free. There is no gross instability. There are no rashes, ulcerations or lesions. Strength and tone are normal. No atrophy or abnormal movements are noted.       Diagnostic Testing:    MR Lumbar spine shows    FINDINGS:  The L5-S1 level shows concentric bulging disc without focal disc herniation.  Facet    arthropathy.  Moderate bilateral L5-S1 foraminal stenosis.       The L4-5 level shows severe central spinal stenosis and moderate bilateral L4-5 foraminal    stenosis secondary to concentric bulging disc, ligamentous hypertrophy and facet arthropathy.     5 mm of anterolisthesis of L4 on L5.       The L3-4 level shows a right foraminal protrusion type disc herniation projecting into the    right L4-5 foramen.  No central spinal stenosis with left L3-4 foramen is patent.       The L2-3 level shows a central protrusion type disc herniation indenting the anterior thecal    sac.  The neural foramina patent.       The L1-2 level shows a left paracentral protrusion type disc herniation indenting the anterior    thecal sac.  The neural foramina patent.       T12-L1 and T11-12 and T10-11 levels show disc space narrowing concentric bulging disc without    focal disc herniation or central spinal stenosis.  The neural foramina patent.       The conus and cauda equina are normal.       Paravertebral soft tissues are normal.       No fracture dislocation identified.                                   CONCLUSION:   1. Severe L4-5 central spinal stenosis secondary to concentric bulging disc, ligamentous    hypertrophy and facet arthropathy. 2. Right foraminal protrusion type L3-4 disc herniation projecting into the right L3-4 foramen. 3. Central protrusion type L2-3 disc herniation indenting the anterior thecal sac. 4. Left paracentral protrusion type L1-2 disc herniation projecting into the left L1-2 foramen. 5. Moderate bilateral L4-5 and moderate bilateral L5-S1 foraminal stenosis. 6. 5 mm of anterolisthesis of L4 on L5.        Results for orders placed or performed in visit on 08/02/21   URINE RT REFLEX TO CULTURE    Specimen: Urine, clean catch   Result Value Ref Range    Color, UA Yellow Straw/Yellow    Clarity, UA SL CLOUDY (A) Clear    Glucose, Ur Negative Negative mg/dL    Bilirubin Urine Negative Negative    Ketones, Urine Negative Negative mg/dL    Specific Gravity, UA 1.020 1.005 - 1.030    Blood, Urine Negative Negative    pH, UA 7.0 5.0 - 8.0    Protein, UA Negative Negative mg/dL    Urobilinogen, Urine 1.0 <2.0 E.U./dL    Nitrite, Urine Negative Negative    Leukocyte Esterase, Urine TRACE (A) Negative    Microscopic Examination YES     Urine Type Cleancatch     Urine Reflex to Culture Not Indicated    Lipid Panel   Result Value Ref Range    Cholesterol, Total 188 0 - 199 mg/dL    Triglycerides 56 0 - 150 mg/dL    HDL 59 40 - 60 mg/dL    LDL Calculated 118 (H) <100 mg/dL    VLDL Cholesterol Calculated 11 Not Established mg/dL   Hemoglobin A1C   Result Value Ref Range    Hemoglobin A1C 5.6 See comment %    eAG 114.0 mg/dL   Vitamin D 25 Hydroxy   Result Value Ref Range    Vit D, 25-Hydroxy 33.1 >=30 ng/mL   COMPREHENSIVE METABOLIC PANEL   Result Value Ref Range    Sodium 141 136 - 145 mmol/L    Potassium 4.4 3.5 - 5.1 mmol/L    Chloride 106 99 - 110 mmol/L    CO2 26 21 - 32 mmol/L    Anion Gap 9 3 - 16    Glucose 74 70 - 99 mg/dL    BUN 12 7 - 20 mg/dL    CREATININE 0.6 0.6 - 1.1 mg/dL    GFR Non-African American >60 >60    GFR African American >60 >60    Calcium 9.4 8.3 - 10.6 mg/dL    Total Protein 6.5 6.4 - 8.2 g/dL    Albumin 4.1 3.4 - 5.0 g/dL    Albumin/Globulin Ratio 1.7 1.1 - 2.2    Total Bilirubin 0.4 0.0 - 1.0 mg/dL    Alkaline Phosphatase 36 (L) 40 - 129 U/L    ALT 24 10 - 40 U/L    AST 33 15 - 37 U/L    Globulin 2.4 g/dL   Microscopic Urinalysis   Result Value Ref Range    Mucus, UA 1+ (A) None Seen /LPF    RBC, UA 0-2 0 - 4 /HPF    Bacteria, UA 4+ (A) None Seen /HPF    Urinalysis Comments see below     Hyaline Casts, UA 3 0 - 8 /LPF    WBC, UA 2 0 - 5 /HPF    Epithelial Cells, UA 8 (H) 0 - 5 /HPF     Impression:       1. Lumbar radiculopathy    2. HNP (herniated nucleus pulposus), lumbar    3. Lumbar foraminal stenosis        Plan:  Clinical Course: Above diagnoses are worsening    I discussed the diagnosis and the treatment options with Yared Ann today. In Summary:  The various treatment options were outlined and discussed with Modesta Rios including:  Conservative care options: physical therapy, ice, medications, bracing, and activity modification. The indications for therapeutic injections. The indications for additional imaging/laboratory studies. The indications for (possible future) interventions. After considering the various options discussed, Yared Ann elected to pursue a course of treatment that includes the followin. Medications:  Continue anti-inflammatories with appropriate GI Precautions including to stop if develop dark tarry stools or GI upset and to take with food. 2. PT:  Encouraged to continue with HEP. 3. Further studies:  No further studies. 4. Interventional:  We discussed pursuing a right L4 and L5 epidural steroid injection to address the pain. Radiologic imaging and symptoms confirm the pain etiology. Risks, benefits and alternatives of interventional options were discussed. These include and are not limited to bleeding, infection, spinal headache, nerve injury and lack of pain relief. The patient verbalized understanding and would like to proceed. The patient will be scheduled accordingly. 5. Follow up:  2-3 weeks      Moisés Cavanaugh was instructed to call the office if her symptoms worsen or if new symptoms appear prior to the next scheduled visit. She is specifically instructed to contact the office between now & her scheduled appointment if she has concerns related to her condition or if she needs assistance in scheduling the above tests. She is welcome to call for an appointment sooner if she has any additional concerns or questions. Arabella Cid PA-C   Board Certified by the [employer] on Certification of [address] and [facility]                This dictation was performed with a verbal recognition program [facility]) and it was checked for errors. It is possible that there are still dictated errors within this office note. If so, please bring any errors to my attention for an addendum. All efforts were made to ensure that this office note is accurate.

## 2021-09-14 ENCOUNTER — HOSPITAL ENCOUNTER (OUTPATIENT)
Dept: INTERVENTIONAL RADIOLOGY/VASCULAR | Age: 56
Discharge: HOME | End: 2021-09-14
Payer: COMMERCIAL

## 2021-09-14 DIAGNOSIS — M54.16 LUMBAR RADICULOPATHY: ICD-10-CM

## 2021-09-14 PROCEDURE — 6360000002 HC RX W HCPCS

## 2021-09-14 PROCEDURE — 64483 NJX AA&/STRD TFRM EPI L/S 1: CPT | Performed by: RADIOLOGY

## 2021-09-14 PROCEDURE — 2500000003 HC RX 250 WO HCPCS

## 2021-09-14 PROCEDURE — 6360000004 HC RX CONTRAST MEDICATION: Performed by: RADIOLOGY

## 2021-09-14 PROCEDURE — 2709999900 HC NON-CHARGEABLE SUPPLY

## 2021-09-14 RX ADMIN — IOHEXOL 10 ML: 180 INJECTION INTRAVENOUS at 10:00

## 2021-10-08 ENCOUNTER — OFFICE VISIT (OUTPATIENT)
Dept: ORTHOPEDIC SURGERY | Age: 56
End: 2021-10-08
Payer: COMMERCIAL

## 2021-10-08 VITALS — HEIGHT: 61 IN | BODY MASS INDEX: 39.65 KG/M2 | WEIGHT: 210 LBS

## 2021-10-08 DIAGNOSIS — M70.61 GREATER TROCHANTERIC BURSITIS OF RIGHT HIP: ICD-10-CM

## 2021-10-08 DIAGNOSIS — M43.16 SPONDYLOLISTHESIS, LUMBAR REGION: ICD-10-CM

## 2021-10-08 DIAGNOSIS — M54.16 LUMBAR RADICULOPATHY: Primary | ICD-10-CM

## 2021-10-08 PROCEDURE — 99213 OFFICE O/P EST LOW 20 MIN: CPT | Performed by: STUDENT IN AN ORGANIZED HEALTH CARE EDUCATION/TRAINING PROGRAM

## 2021-10-08 RX ORDER — PREDNISONE 10 MG/1
10 TABLET ORAL DAILY
Qty: 26 TABLET | Refills: 0 | Status: SHIPPED | OUTPATIENT
Start: 2021-10-08

## 2021-10-08 NOTE — PROGRESS NOTES
Follow up: Walt Chopra  1965  [id number]      CHIEF COMPLAINT:    Chief Complaint   Patient presents with    Follow-up     FU LUMBAR, patient was at the foot of the bed while her daughter was having her  and the nursing student to her left fainted knocking the patient over and the patient landed on her right side. HISTORY OF PRESENT ILLNESS:  Ms. Martha Reich is a 63 y.o. female returns for a follow up visit for multiple medical problems. Her current presenting problems are   1. Lumbar radiculopathy    2. Spondylolisthesis, lumbar region    3. Greater trochanteric bursitis of right hip    . As per information/history obtained from the PADT(patient assessment and documentation tool) - She complains of pain in the lower back with radiation to the buttocks, hips Right and lower leg Right She rates the pain 6/10 and describes it as sharp, aching. Pain is made worse by: sitting, lying down. She denies side effects from the current pain regimen. Patient reports that since the last follow up visit the physical functioning is worse, family/social relationships are unchanged, mood is unchanged and sleep patterns are worse, and that the overall functioning is worse. Patient denies neurological bowel or bladder. The patient presents for follow up of ongoing low back and right leg pain. She had a fall on to her right side 10/4/21 and subsequently presented to the ED for worsening low back, right hip and right leg pain. Aggravating factors include sitting and lying down. Standing helps alleviating her pain. Pain radiates to the lateral hip and down the lateral aspect of the right upper leg to the calf. She describes the calf pain as burning. The hip pain is most bothersome when lying on that side or with pressure. She can sit for 15-20 minutes, stand for 30 minutes and walk for 15-20 minutes until she needs to change positions.      The patient had a right L4 and L5 transforaminal epidural steroid injection on 21 which provided great relief, however the fall has exacerbated the pain. Associated signs and symptoms:   Neurogenic bowel or bladder symptoms:  no   Perceived weakness:  no   Difficulty walking:  no              Past Medical History:   Past Medical History:   Diagnosis Date    Arthritis     Hyperlipidemia     Vitamin D deficiency       Past Surgical History:     Past Surgical History:   Procedure Laterality Date     SECTION      EYE SURGERY Bilateral     EYE SURGERY      FOOT SURGERY Right     MANDIBLE SURGERY      TONSILLECTOMY      TUMOR REMOVAL  1965     Current Medications:     Current Outpatient Medications:     predniSONE (DELTASONE) 10 MG tablet, Take 1 tablet by mouth daily USE THIS SIG->[id number]; [id number]; [id number]; 1pillPOQD, Disp: 26 tablet, Rfl: 0    Cholecalciferol (VITAMIN D3) 50 MCG (2000 UT) CAPS, Take 1 capsule by mouth daily, Disp: 30 capsule, Rfl: 3    naproxen (NAPROSYN) 500 MG tablet, Take 1 tablet by mouth 2 times daily as needed for Pain, Disp: 20 tablet, Rfl: 0    lidocaine (LIDODERM) 5 %, Place 1 patch onto the skin daily 12 hours on, 12 hours off., Disp: 12 patch, Rfl: 0    senna (SENOKOT) 8.6 MG tablet, Take 1 tablet by mouth daily, Disp: , Rfl:     NONFORMULARY, BIOMED COMPOUND FOR KNEE, Disp: , Rfl:     triamcinolone (KENALOG) 0.025 % cream, APPLY TOPICALLY TO RASH TWO TIMES A DAY AS NEEDED, Disp: 1 g, Rfl: 1    Multiple Vitamins-Minerals (THERAPEUTIC MULTIVITAMIN-MINERALS) tablet, Take 1 tablet by mouth daily, Disp: , Rfl:     Chromium Picolinate 200 MCG CAPS, Take by mouth 2 times daily , Disp: , Rfl:   Allergies:  Sulfa antibiotics  Social History:    reports that she has never smoked. She has never used smokeless tobacco. She reports current alcohol use. She reports that she does not use drugs.   Family History:   Family History   Problem Relation Age of Onset    High Blood Pressure Mother     Stroke Brother     Diabetes Maternal Grandfather     Diabetes Other         NEPHEW    Heart Disease Other     Cancer Other     Breast Cancer Other        REVIEW OF SYSTEMS:   CONSTITUTIONAL: Denies unexplained weight loss, fevers, chills or fatigue  NEUROLOGICAL: Denies unsteady gait or progressive weakness  MUSCULOSKELETAL: Denies joint swelling or redness  GI: Denies nausea, vomiting, diarrhea   : Denies bowel or bladder issues       PHYSICAL EXAM:    Vitals: Height 5' 1\" (1.549 m), weight 210 lb (95.3 kg), not currently breastfeeding. GENERAL EXAM:  · General Apparence: Patient is adequately groomed with no evidence of malnutrition. · Psychiatric: Orientation: The patient is oriented to time, place and person. The patient's mood and affect are appropriate   · Vascular: Examination reveals no swelling and palpation reveals no tenderness in upper or lower extremities. Good capillary refill. · The lymphatic examination of the neck, axillae and groin reveals all areas to be without enlargement or induration  · Sensation is intact without deficit in the upper and lower extremities to light touch and pinprick  · Coordination of the upper and lower extremities are normal.  · RIGHT UPPER EXTREMITY:  Inspection/examination of the right upper extremity does not show any tenderness, deformity or injury. Range of motion is unremarkable and pain-free. There is no gross instability. There are no rashes, ulcerations or lesions. Strength and tone are normal. No atrophy or abnormal movements are noted. · LEFT UPPER EXTREMITY: Inspection/examination of the left upper extremity does not show any tenderness, deformity or injury. Range of motion is unremarkable and pain-free. There is no gross instability. There are no rashes, ulcerations or lesions. Strength and tone are normal. No atrophy or abnormal movements are noted.     LUMBAR/SACRAL EXAMINATION:  · Inspection: Local inspection shows no step-off or bruising. Lumbar alignment is normal. No instability is noted. · Palpation:   No evidence of tenderness at the midline. Lumbar paraspinal tenderness: Mild L4/5 and L5/S1 tenderness  Bursal tenderness Right greater trochanteric tenderness  There is no paraspinal spasm. · Range of Motion: limited by 25% in all planes due to pain  · Strength:   Strength testing is 5/5 in all muscle groups tested. · Special Tests:   Straight leg raise mildly positive right and crossed SLR negative. Herbert's testing is negative bilaterally. FADIR's testing is negative bilaterally. · Skin: There are no rashes, ulcerations or lesions. · Reflexes: Reflexes are symmetrically 2+ at the patellar and ankle tendons. Clonus absent bilaterally at the feet. · Gait & station: normal, patient ambulates without assistance  · Additional Examinations:  · RIGHT LOWER EXTREMITY: Inspection/examination of the right lower extremity does not show any tenderness, deformity or injury. Range of motion is normal and pain-free. There is no gross instability. There are no rashes, ulcerations or lesions. Strength and tone are normal. No atrophy or abnormal movements are noted. · LEFT LOWER EXTREMITY:  Inspection/examination of the left lower extremity does not show any tenderness, deformity or injury. Range of motion is normal and pain-free. There is no gross instability. There are no rashes, ulcerations or lesions. Strength and tone are normal. No atrophy or abnormal movements are noted.       Diagnostic Testing:    MR Lumbar spine shows    FINDINGS:  The L5-S1 level shows concentric bulging disc without focal disc herniation.  Facet    arthropathy.  Moderate bilateral L5-S1 foraminal stenosis.       The L4-5 level shows severe central spinal stenosis and moderate bilateral L4-5 foraminal    stenosis secondary to concentric bulging disc, ligamentous hypertrophy and facet arthropathy.     5 mm of anterolisthesis of L4 on L5.       The L3-4 level shows a right foraminal protrusion type disc herniation projecting into the    right L4-5 foramen.  No central spinal stenosis with left L3-4 foramen is patent.       The L2-3 level shows a central protrusion type disc herniation indenting the anterior thecal    sac.  The neural foramina patent.       The L1-2 level shows a left paracentral protrusion type disc herniation indenting the anterior    thecal sac.  The neural foramina patent.       T12-L1 and T11-12 and T10-11 levels show disc space narrowing concentric bulging disc without    focal disc herniation or central spinal stenosis.  The neural foramina patent.       The conus and cauda equina are normal.       Paravertebral soft tissues are normal.       No fracture dislocation identified.               Results for orders placed or performed in visit on 08/02/21   URINE RT REFLEX TO CULTURE    Specimen: Urine, clean catch   Result Value Ref Range    Color, UA Yellow Straw/Yellow    Clarity, UA SL CLOUDY (A) Clear    Glucose, Ur Negative Negative mg/dL    Bilirubin Urine Negative Negative    Ketones, Urine Negative Negative mg/dL    Specific Gravity, UA 1.020 1.005 - 1.030    Blood, Urine Negative Negative    pH, UA 7.0 5.0 - 8.0    Protein, UA Negative Negative mg/dL    Urobilinogen, Urine 1.0 <2.0 E.U./dL    Nitrite, Urine Negative Negative    Leukocyte Esterase, Urine TRACE (A) Negative    Microscopic Examination YES     Urine Type Cleancatch     Urine Reflex to Culture Not Indicated    Lipid Panel   Result Value Ref Range    Cholesterol, Total 188 0 - 199 mg/dL    Triglycerides 56 0 - 150 mg/dL    HDL 59 40 - 60 mg/dL    LDL Calculated 118 (H) <100 mg/dL    VLDL Cholesterol Calculated 11 Not Established mg/dL   Hemoglobin A1C   Result Value Ref Range    Hemoglobin A1C 5.6 See comment %    eAG 114.0 mg/dL   Vitamin D 25 Hydroxy   Result Value Ref Range    Vit D, 25-Hydroxy 33.1 >=30 ng/mL   COMPREHENSIVE METABOLIC PANEL   Result Value Ref Range    Sodium 141 136 - 145 mmol/L    Potassium 4.4 3.5 - 5.1 mmol/L    Chloride 106 99 - 110 mmol/L    CO2 26 21 - 32 mmol/L    Anion Gap 9 3 - 16    Glucose 74 70 - 99 mg/dL    BUN 12 7 - 20 mg/dL    CREATININE 0.6 0.6 - 1.1 mg/dL    GFR Non-African American >60 >60    GFR African American >60 >60    Calcium 9.4 8.3 - 10.6 mg/dL    Total Protein 6.5 6.4 - 8.2 g/dL    Albumin 4.1 3.4 - 5.0 g/dL    Albumin/Globulin Ratio 1.7 1.1 - 2.2    Total Bilirubin 0.4 0.0 - 1.0 mg/dL    Alkaline Phosphatase 36 (L) 40 - 129 U/L    ALT 24 10 - 40 U/L    AST 33 15 - 37 U/L    Globulin 2.4 g/dL   Microscopic Urinalysis   Result Value Ref Range    Mucus, UA 1+ (A) None Seen /LPF    RBC, UA 0-2 0 - 4 /HPF    Bacteria, UA 4+ (A) None Seen /HPF    Urinalysis Comments see below     Hyaline Casts, UA 3 0 - 8 /LPF    WBC, UA 2 0 - 5 /HPF    Epithelial Cells, UA 8 (H) 0 - 5 /HPF     Impression:       1. Lumbar radiculopathy    2. Spondylolisthesis, lumbar region    3. Greater trochanteric bursitis of right hip        Plan:  Clinical Course: Above diagnoses are worsening    I discussed the diagnosis and the treatment options with Sanna Penaloza today. In Summary:  The various treatment options were outlined and discussed with Modesta Rios including:  Conservative care options: physical therapy, ice, medications, bracing, and activity modification. The indications for therapeutic injections. The indications for additional imaging/laboratory studies. The indications for (possible future) interventions. After considering the various options discussed, Sanna Penaloza elected to pursue a course of treatment that includes the followin. Medications:  I will prescribe prednisone to help with inflammation and reduce pain. CV, GI and Nephro risks were reviewed. 2. PT:  Encouraged to continue with HEP. 3. Further studies:  No further studies.     4. Interventional:  We discussed pursuing a right L4 and L5 transforaminal epidural steroid injection to address the pain. I also offered a greater trochanteric bursa injection. The patient would like to try more conservative management through medication first.     5. Follow up:  1-2 weeks      Ariel Marinelli was instructed to call the office if her symptoms worsen or if new symptoms appear prior to the next scheduled visit. She is specifically instructed to contact the office between now & her scheduled appointment if she has concerns related to her condition or if she needs assistance in scheduling the above tests. She is welcome to call for an appointment sooner if she has any additional concerns or questions. Opal Maurice PA-C   Board Certified by the [employer] on Certification of [address] and Orthopaedics                This dictation was performed with a verbal recognition program [facility]) and it was checked for errors. It is possible that there are still dictated errors within this office note. If so, please bring any errors to my attention for an addendum. All efforts were made to ensure that this office note is accurate.

## 2021-10-26 ENCOUNTER — NURSE TRIAGE (OUTPATIENT)
Dept: OTHER | Facility: CLINIC | Age: 56
End: 2021-10-26

## 2021-10-26 NOTE — TELEPHONE ENCOUNTER
Received call from [facility] at [facility] with Red Flag Complaint. Brief description of triage: Racing heartbeat, woke 114 - 177. States heart rate now is 82. A couple episodes last week as well. Slight chest pain/difficulty breathing when it happens, none right now. Has been on prednisone but finished it last week. Triage indicates for patient to be seen within 24 hours. Will go to [facility] if no appt. Care advice provided, patient verbalizes understanding; denies any other questions or concerns; instructed to call back for any new or worsening symptoms. Writer provided warm transfer to [place] at [facility] for appointment scheduling. Attention Provider: Thank you for allowing me to participate in the care of your patient. The patient was connected to triage in response to information provided to the ECC/PSC. Please do not respond through this encounter as the response is not directed to a shared pool. Reason for Disposition   Heart beating very rapidly (e.g., > 140 / minute) and not present now (Exception: during exercise)    Answer Assessment - Initial Assessment Questions  1. DESCRIPTION: \"Please describe your heart rate or heart beat that you are having\" (e.g., fast/slow, regular/irregular, skipped or extra beats, \"palpitations\")      Racing heart, irregular, woke her up at times    2. ONSET: \"When did it start? \" (Minutes, hours or days)       Last week    3. DURATION: \"How long does it last\" (e.g., seconds, minutes, hours)      A couple minutes    4. PATTERN \"Does it come and go, or has it been constant since it started? \"  \"Does it get worse with exertion? \"   \"Are you feeling it now? \"      Comes and goes    5. TAP: \"Using your hand, can you tap out what you are feeling on a chair or table in front of you, so that I can hear? \" (Note: not all patients can do this)        N/A    6. HEART RATE: \"Can you tell me your heart rate? \" \"How many beats in 15 seconds? \"  (Note: not all patients can do this)        82 now    7. RECURRENT SYMPTOM: \"Have you ever had this before? \" If so, ask: \"When was the last time? \" and \"What happened that time? \"       Stress test years ago, nothing since    8. CAUSE: \"What do you think is causing the palpitations? \"      No    9. CARDIAC HISTORY: \"Do you have any history of heart disease? \" (e.g., heart attack, angina, bypass surgery, angioplasty, arrhythmia)       No    10. OTHER SYMPTOMS: \"Do you have any other symptoms? \" (e.g., dizziness, chest pain, sweating, difficulty breathing)        No    11. PREGNANCY: \"Is there any chance you are pregnant? \" \"When was your last menstrual period? \"        N/A    Protocols used: HEART RATE AND HEARTBEAT QUESTIONS-ADULT-[place]

## 2021-10-27 ENCOUNTER — OFFICE VISIT (OUTPATIENT)
Dept: INTERNAL MEDICINE CLINIC | Age: 56
End: 2021-10-27
Payer: COMMERCIAL

## 2021-10-27 VITALS
OXYGEN SATURATION: 98 % | HEIGHT: 61 IN | HEART RATE: 90 BPM | DIASTOLIC BLOOD PRESSURE: 80 MMHG | WEIGHT: 212 LBS | BODY MASS INDEX: 40.02 KG/M2 | TEMPERATURE: 96.6 F | SYSTOLIC BLOOD PRESSURE: 130 MMHG

## 2021-10-27 DIAGNOSIS — M54.50 ACUTE RIGHT-SIDED LOW BACK PAIN, UNSPECIFIED WHETHER SCIATICA PRESENT: ICD-10-CM

## 2021-10-27 DIAGNOSIS — F43.9 SITUATIONAL STRESS: ICD-10-CM

## 2021-10-27 DIAGNOSIS — M17.10 ARTHRITIS OF KNEE: ICD-10-CM

## 2021-10-27 DIAGNOSIS — E66.01 MORBID OBESITY (HCC): ICD-10-CM

## 2021-10-27 DIAGNOSIS — R00.2 PALPITATIONS: Primary | ICD-10-CM

## 2021-10-27 DIAGNOSIS — E78.49 OTHER HYPERLIPIDEMIA: ICD-10-CM

## 2021-10-27 DIAGNOSIS — R21 RASH: ICD-10-CM

## 2021-10-27 DIAGNOSIS — E55.9 VITAMIN D DEFICIENCY: ICD-10-CM

## 2021-10-27 PROCEDURE — 93000 ELECTROCARDIOGRAM COMPLETE: CPT | Performed by: INTERNAL MEDICINE

## 2021-10-27 PROCEDURE — 99214 OFFICE O/P EST MOD 30 MIN: CPT | Performed by: INTERNAL MEDICINE

## 2021-10-27 RX ORDER — ACETAMINOPHEN 160 MG
1 TABLET,DISINTEGRATING ORAL DAILY
Qty: 30 CAPSULE | Refills: 3 | Status: SHIPPED | OUTPATIENT
Start: 2021-10-27

## 2021-10-27 ASSESSMENT — PATIENT HEALTH QUESTIONNAIRE - PHQ9
SUM OF ALL RESPONSES TO PHQ QUESTIONS 1-9: 0
SUM OF ALL RESPONSES TO PHQ QUESTIONS 1-9: 0
SUM OF ALL RESPONSES TO PHQ9 QUESTIONS 1 & 2: 0
1. LITTLE INTEREST OR PLEASURE IN DOING THINGS: 0
SUM OF ALL RESPONSES TO PHQ QUESTIONS 1-9: 0
2. FEELING DOWN, DEPRESSED OR HOPELESS: 0

## 2021-10-27 NOTE — PROGRESS NOTES
SUBJECTIVE:  Garcia Abraham is a 61 y.o. female [address] [place]   Chief Complaint   Patient presents with    Follow-up     needs ekg     Hyperlipidemia           PT HERE FOR EVAL          C/O PALPITATIONS -  INTERMITTENT PAST 1 + WEEK. NO SYNCOPE  LOW BACK PAIN - PERSISTENT. SHARP PAIN. RT SIDE, ? RAD TO RLE. PAIN SCALE 4/10. NO NUMBNESS / NO TINGLING. C/O STRESS - ? DURATION. DENIES DEPRESSION  HLP - NOT ON MED. ? EXERCISE / DIET COMPLIANCE . LAB D/W PT  ARTHRITIS - RADHA KNEES. LT > RT.  SHARP PAIN, ? RAD, PAIN SCALE 6/10. NO SWELLING. NO RECENT TRAUMA. S/P ORTHO EVAL  VIT D DEF -  TAKING MED. LAST  LABS D/W PT  C/O RASH - RT UE - ? DURATION. + ITCHING, NO PAIN. NO CHANGE IN SKIN CARE PRODUCTS  MORBID OBESITY - DIET / EXERCISE REVIEWED. WT NOTED        DENIES CP, ? No SOB, + PALPITATIONS, No COUGH, NO F/C  No ABD PAIN, No N/V, No DIARRHEA, No CONSTIPATION, No MELENA, No HEMATOCHEZIA. No DYSURIA, No FREQ, No URGENCY, No HEMATURIA    PMH: REVIEWED AND UPDATED TODAY    PSH: REVIEWED AND UPDATED TODAY    SOCIAL HX: REVIEWED AND UPDATED TODAY    FAMILY HX: REVIEWED AND UPDATED TODAY    ALLERGY:  Sulfa antibiotics    MEDS: REVIEWED  Prior to Visit Medications    Medication Sig Taking? Authorizing Provider   Cholecalciferol (VITAMIN D3) 50 MCG (2000 UT) CAPS Take 1 capsule by mouth daily Yes Gato Bustamante MD   naproxen (NAPROSYN) 500 MG tablet Take 1 tablet by mouth 2 times daily as needed for Pain Yes Gato Bustamante MD   lidocaine (LIDODERM) 5 % Place 1 patch onto the skin daily 12 hours on, 12 hours off.  Yes Farnaz Milton MD   senna (SENOKOT) 8.6 MG tablet Take 1 tablet by mouth daily Yes Historical Provider, MD   NONFORMULARY BIOMED COMPOUND FOR KNEE Yes Historical Provider, MD   triamcinolone (KENALOG) 0.025 % cream APPLY TOPICALLY TO RASH TWO TIMES A DAY AS NEEDED Yes Tan Scott, APRN - CNP   Multiple Vitamins-Minerals (THERAPEUTIC MULTIVITAMIN-MINERALS) tablet Take 1 tablet by mouth daily Yes Historical Provider, MD   Chromium Picolinate 200 MCG CAPS Take by mouth 2 times daily  Yes Historical Provider, MD   predniSONE (DELTASONE) 10 MG tablet Take 1 tablet by mouth daily USE THIS SIG->[id number]; [id number]; [id number]; 1pillPOQD  Patient not taking: Reported on 10/27/2021  VAZQUEZ Jon       ROS: COMPREHENSIVE ROS AS IN HX, REST -VE  History obtained from chart review and the patient       OBJECTIVE:   NURSING NOTE AND VITALS REVIEWED  /88 (Site: Left Upper Arm, Position: Sitting, Cuff Size: Large Adult)   Pulse 90   Ht 5' 1\" (1.549 m)   Wt 212 lb (96.2 kg)   SpO2 98%   Breastfeeding No   BMI 40.06 kg/m²     NO ACUTE DISTRESS    REPEAT BP:  130/80 (LT), NO ORTHOSTASIS     Body mass index is 40.06 kg/m². HEENT: NO PALLOR, ANICTERIC, PERRLA, EOMI, NO CONJUNCTIVAL ERYTHEMA,                 NO SINUS TENDERNESS  NECK:  SUPPLE, TRACHEA MIDLINE, NT, NO JVD, NO CB, NO LA, NO TM, NO STIFFNESS  CHEST: RESPY EFFORT NL, GOOD AE, NO W/R/C  HEART: S1S2+ REG, NO M/G/R  ABD: OBESE, SOFT, NT, NO HSM, BS+  EXT: NO EDEMA, NT, PULSES +. JOSE MARTIN'S -VE  NEURO: ALERT AND ORIENTED X 3, NO MENINGEAL SIGNS, NO TREMORS, NL GAIT, NO FOCAL DEFICITS  PSYCH: FAIRLY GOOD AFFECT  BACK: NT, NO ROM, NO CVA TENDERNESS  KNEE ? NT, NO ROM  SKIN: MACULOPAPULAR  ERYTHEMATOUS RASH POSTLAT INF RT ARM       PREVIOUS LABS REVIEWED AND D/W PT    EKG: SINUS RHYTHM HR 78, NO ACUTE FINDINGS    ASSESSMENT / PLAN:     Diagnosis Orders   1. Palpitations  COUNSELLED. EKG UNREMARKABLE  AVOID CAFFEINE. TSH TO EVAL  ADVISED ON STRESS MGT. MONITOR. MAKE CHANGES AS NEEDED. 2. Acute right-sided low back pain, unspecified whether sciatica present  COUNSELLED. ? OA. EXERCISES. ANALGESICS PRN. MONITOR. MAKE CHANGES AS NEEDED. 3. Situational stress  COUNSELLED. SITUATIONAL. PT COUNSELLED  ON RELAXATION TECHNIQUES. ADDRESSED STRESS MGT. MONITOR  PT NOT SUICIDAL/ NOT HOMICIDAL  MAKE CHANGES AS NEEDED. 4. Other hyperlipidemia  COUNSELLED. ADVISED LOW FAT / CHOL DIET/ EXERCISE.  MONITOR. GOALS D/W PT.  MAKE CHANGES AS NEEDED. 5. Arthritis of knee  COUNSELLED. EXERCISES. ANALGESICS PRN. MONITOR. ADVISED HOME EXERCISES, WT LOSS  MAKE CHANGES AS NEEDED. 6. Vitamin D deficiency  COUNSELLED. MONITOR ON VIT D SUPPLEMENT. MAKE CHANGES AS NEEDED. 7. Rash  COUNSELLED. MAINTAIN HYGIENE  LIDEX 0.05 % cream PRN. MONITOR  MAKE CHANGES AS NEEDED. 8. Morbid obesity ([address]  COUNSELLED. DIET/ EXERCISE ADVISED. LIFESTYLE MODIFICATION. WT LOSS ADVISED. MONITOR AND MAKE CHANGES AS NEEDED.                          MEDICATION SIDE EFFECTS D/W PATIENT    RETURN TO CLINIC WITHIN 3 MONTHS / PRN    FOLLOW UP FOR LAB, MAMMOGRAM

## 2021-12-07 ENCOUNTER — E-VISIT (OUTPATIENT)
Dept: PRIMARY CARE CLINIC | Age: 56
End: 2021-12-07
Payer: COMMERCIAL

## 2021-12-07 DIAGNOSIS — R05.9 COUGH: ICD-10-CM

## 2021-12-07 DIAGNOSIS — J01.90 ACUTE NON-RECURRENT SINUSITIS, UNSPECIFIED LOCATION: Primary | ICD-10-CM

## 2021-12-07 PROCEDURE — 99422 OL DIG E/M SVC 11-20 MIN: CPT | Performed by: NURSE PRACTITIONER

## 2021-12-07 RX ORDER — AMOXICILLIN AND CLAVULANATE POTASSIUM 875; 125 MG/1; MG/1
1 TABLET, FILM COATED ORAL 2 TIMES DAILY
Qty: 20 TABLET | Refills: 0 | Status: SHIPPED | OUTPATIENT
Start: 2021-12-07 | End: 2021-12-17

## 2021-12-07 ASSESSMENT — LIFESTYLE VARIABLES: SMOKING_STATUS: NO, I'VE NEVER SMOKED

## 2021-12-07 NOTE — PROGRESS NOTES
Reviewed questionnaire  Reviewed previous encounters, medications, allergies and history     Dx sinusitis  Cough     Plan   rx for augmentin     1. Water: Drink 1/2 of body weight (lb) in ounces,  Up to 90 Ounces of water per day. This will loosen mucus in the head and chest & improve the weak feeling of dehydration, allow the body to get germ fighting resources to the infection. Half can be juice or sugar free powerade or garorate. Don't count drinks with caffeine or carbonation. Infants can have Pedialyte liquid or freezer pops. Avoid salt if you have high Blood Pressure, swelling in the feet or ankles or have heart problems. 2. Humidity: Humidify the air to 35-50% ( or until the windows fog over slightly). Summer use of an air conditioner turned down too far and can result in dry air. Can use a humidifier, vaporizer, boil water on the stove or put a coffee can full of water on the heater vents. This will loosen mucus from infections and allergies. 3. Sleep: Get 8-10 hours a night and rest during the evening after work or school. If you have trouble sleeping, adults can take Melatonin 5mg up to 2 tabs at bedtime ( not for children or pregnant women). If Mono is suspected then sleep during 9PM to 9AM time span (if possible.)   4. Cough: Take cough medicines with Guaifenesin ( to loosen chest or head congestion) and Dextromethorphan ( to decrease excess cough). Robitussin D.M. Syrup every 4-6 hrs or Mucinex D. M. pills twice a day. Use the pediatric formulations for children over 6 months making sure they are alcohol & sugar free for children, pregnant women, and diabetics. 5. Pain And Fevers: Take Acetaminophen ( Tylenol) for fevers, aches, and headaches. 2-500 mg every 8 hours for adults. Appropriate doses at bedtime for children may help them sleep better. Ibuprofen may be used if not pregnant, but should be given with food to avoid nausea.  Avoid Ibuprofen if you have high blood pressure, CHF, or kidney problems. 6.Gargle: (DAY ONE OF SYMPTOMS) Gargle in the back of the throat with the head tilted back and to the sides with a strong mouthwash  ( Listerine or Scope) after meals and at bedtime at least 4 -5 times a day. This helps kill bacteria and viruses in the back of the throat and will shorten the duration and decrease the severity of your symptoms: sore throat, cough, ear popping,/ear pain, and possibly dizziness. 7. Smoking: Avoid smoking or exposure to second hand smoke. 8. Zinc: (DAY ONE OF SYMPTOMS)  Zinc lozenges such as Cold Jos (available most stores), or Basic ([employer] brand) will help shorten the duration and lessen symptoms such as sore throat, cough, nasal congestion, runny nose, and post nasal drip. Use 1 lozenge every 2-4 hours ( after meals if stomach is sensitive). Children can use 10-15 mg or less 3-4 times a day or Zinc lollypops. In pregnancy limit to 50-60 mg a day for 7 days as prenatals have Zinc also. With diarrhea use zinc pills 50 mg 1/2 to 1 pill 2x/day. 9. Vitamins: Vitamin C 500 mg with breakfast and dinner. Children and pregnant women should drink citrus juices. This speeds healing and strengthens immune system. 10. Chest Symptoms: Vicks Vapor rub to the chest at bedtime. 11. Decongestants: Avoid all decongestants if you have high blood pressure. Safe to take if you do not have high blood pressure. Try all of the above starting with day 1 of symptoms. If Strep throat symptoms appear call to be seen in the office as soon as possible and don't gargle on that day. Newborns, infants, or anyone with earaches or influenza may need to be seen quickly. Adults with fevers over 103 degrees or shortness of breath should call the office immediately. 12. Nasal saline spray or rinse. There are many different ways to do this OTC. I hope that you feel better. If you do not feel better after treatment, please f/u with pcp.       Time spent 11-20 minutes

## 2022-01-17 ENCOUNTER — OFFICE VISIT (OUTPATIENT)
Dept: ORTHOPEDIC SURGERY | Age: 57
End: 2022-01-17
Payer: COMMERCIAL

## 2022-01-17 VITALS — HEIGHT: 61 IN | BODY MASS INDEX: 40.02 KG/M2 | WEIGHT: 212 LBS

## 2022-01-17 DIAGNOSIS — M65.319 ACQUIRED TRIGGER THUMB: Primary | ICD-10-CM

## 2022-01-17 PROCEDURE — 20550 NJX 1 TENDON SHEATH/LIGAMENT: CPT | Performed by: ORTHOPAEDIC SURGERY

## 2022-01-17 RX ORDER — TRIAMCINOLONE ACETONIDE 40 MG/ML
20 INJECTION, SUSPENSION INTRA-ARTICULAR; INTRAMUSCULAR ONCE
Status: COMPLETED | OUTPATIENT
Start: 2022-01-17 | End: 2022-01-17

## 2022-01-17 RX ORDER — LIDOCAINE HYDROCHLORIDE 10 MG/ML
0.5 INJECTION, SOLUTION INFILTRATION; PERINEURAL ONCE
Status: COMPLETED | OUTPATIENT
Start: 2022-01-17 | End: 2022-01-17

## 2022-01-17 RX ADMIN — LIDOCAINE HYDROCHLORIDE 0.5 ML: 10 INJECTION, SOLUTION INFILTRATION; PERINEURAL at 13:40

## 2022-01-17 RX ADMIN — TRIAMCINOLONE ACETONIDE 20 MG: 40 INJECTION, SUSPENSION INTRA-ARTICULAR; INTRAMUSCULAR at 13:41

## 2022-01-17 NOTE — PROGRESS NOTES
I last examined this patient 2+ years ago at which time I injected the right thumb for treatment of trigger finger. She obtained prompt and complete relief of all symptoms. Unfortunately, the condition has recurred and the patient returns to the office requesting additional treatment. She complains of pain, swelling, catching and stiffness of the digit for the past 1 week. Symptoms have become worse recently. The patient's social history, past medical history, family history, medications, allergies and review of systems have been reviewed, dated 1/17/22 and are recorded in the chart. I have personally examined and reviewed all previous imaging studies, laboratory tests(TSH, CMP, HbA1c, Lipids) and medical encounters pertinent to this patient's visit today. The patient is seen today, in the presence of their daughter , who has participated in the obtaining an accurate medical history. On examination there is mild soft tissue swelling of the digit. There is no deformity. There is tenderness, thickening and nodularity at the base of the flexor tendon sheath. Range of motion is slightly limited in flexion and extension. The digit sticks in flexion and pops into extension, accompanied by some pain. Skin is intact without lesions. Distal circulation and sensation are intact. Muscle strength and coordination are normal.  Reflexes and present bilaterally. Joints are stable. There are no subcutaneous nodules or enlarged epitrochlear lymph nodes. Impression: Recurrent right thumb trigger digit. The nature of this medical problem is fully discussed with the patient, including all treatment options. All questions are answered. The right  hand is prepared with Betadine and alcohol and the flexor tendon sheath of the right thumb is injected with 1/2 milliliter of 1% lidocaine and 20 mg.of triamcinalone, with good filling.   The patient is advised regarding the expected response and possible reactions from the injection. The patient is asked to call me if full, painless function has not returned within 4 weeks. The possibility of recurrence of the problem is discussed.

## 2022-02-11 ENCOUNTER — OFFICE VISIT (OUTPATIENT)
Dept: ORTHOPEDIC SURGERY | Age: 57
End: 2022-02-11
Payer: COMMERCIAL

## 2022-02-11 VITALS — BODY MASS INDEX: 40.01 KG/M2 | HEIGHT: 61 IN | WEIGHT: 211.9 LBS

## 2022-02-11 DIAGNOSIS — M48.062 SPINAL STENOSIS OF LUMBAR REGION WITH NEUROGENIC CLAUDICATION: Primary | ICD-10-CM

## 2022-02-11 PROCEDURE — 99214 OFFICE O/P EST MOD 30 MIN: CPT | Performed by: STUDENT IN AN ORGANIZED HEALTH CARE EDUCATION/TRAINING PROGRAM

## 2022-02-11 RX ORDER — NAPROXEN 500 MG/1
500 TABLET ORAL 2 TIMES DAILY WITH MEALS
Qty: 60 TABLET | Refills: 0 | Status: SHIPPED | OUTPATIENT
Start: 2022-02-11

## 2022-02-11 RX ORDER — GABAPENTIN 300 MG/1
300 CAPSULE ORAL NIGHTLY
Qty: 30 CAPSULE | Refills: 0 | Status: SHIPPED | OUTPATIENT
Start: 2022-02-11 | End: 2022-03-13

## 2022-02-11 NOTE — PROGRESS NOTES
Follow up: Aris Brittle  1965  <[id number]>      CHIEF COMPLAINT:    Chief Complaint   Patient presents with    Follow-up     FU LUMBAR, pt is here to discus treatment options and possible injection         HISTORY OF PRESENT ILLNESS:  Ms. Orvis Hashimoto is a 61 y.o. female returns for a follow up visit for multiple medical problems. Her current presenting problems are   1. Spinal stenosis of lumbar region with neurogenic claudication    . As per information/history obtained from the PADT(patient assessment and documentation tool) - She complains of pain in the lower back with radiation to the upper leg Bilateral She rates the pain 6/10 and describes it as dull, aching, throbbing. Pain is made worse by: walking, standing. She denies side effects from the current pain regimen. Patient reports that since the last follow up visit the physical functioning is worse, family/social relationships are unchanged, mood is unchanged and sleep patterns are unchanged, and that the overall functioning is worse. Patient denies neurological bowel or bladder. The patient presents for follow-up of ongoing low back and bilateral leg pain. She had a right L4-L5 transforaminal epidural steroid injection on 9/14/2021. This provided greater than 50% relief until about 2 weeks ago. The patient states the pain then worsened last Monday. She is having pain with transitional movements, standing for long periods or walking for long periods. Pain radiates down the posterior aspect of the legs to the hamstrings. She had a stay out of work for 3 consecutive days last week due to the severity of pain. She is interested in discussing surgery.       Associated signs and symptoms:   Neurogenic bowel or bladder symptoms:  no   Perceived weakness:  no   Difficulty walking:  no              Past Medical History:   Past Medical History:   Diagnosis Date    Arthritis     Hyperlipidemia     Vitamin D deficiency       Past Surgical History:     Past Surgical History:   Procedure Laterality Date     SECTION      EYE SURGERY Bilateral     EYE SURGERY      FOOT SURGERY Right     MANDIBLE SURGERY      TONSILLECTOMY      TUMOR REMOVAL       Current Medications:     Current Outpatient Medications:     naproxen (NAPROSYN) 500 MG tablet, Take 1 tablet by mouth 2 times daily (with meals), Disp: 60 tablet, Rfl: 0    gabapentin (NEURONTIN) 300 MG capsule, Take 1 capsule by mouth nightly for 30 days. Intended supply: 30 days, Disp: 30 capsule, Rfl: 0    Cholecalciferol (VITAMIN D3) 50 MCG (2000) CAPS, Take 1 capsule by mouth daily, Disp: 30 capsule, Rfl: 3    fluocinonide (LIDEX) 0.05 % cream, Apply topically 2 times daily / PRN., Disp: 30 g, Rfl: 0    predniSONE (DELTASONE) 10 MG tablet, Take 1 tablet by mouth daily USE THIS SIG->[id number]; [id number]; [id number]; 1pillPOQD (Patient not taking: Reported on 2022), Disp: 26 tablet, Rfl: 0    naproxen (NAPROSYN) 500 MG tablet, Take 1 tablet by mouth 2 times daily as needed for Pain (Patient not taking: Reported on 2022), Disp: 20 tablet, Rfl: 0    lidocaine (LIDODERM) 5 %, Place 1 patch onto the skin daily 12 hours on, 12 hours off. (Patient not taking: Reported on 2022), Disp: 12 patch, Rfl: 0    senna (SENOKOT) 8.6 MG tablet, Take 1 tablet by mouth daily, Disp: , Rfl:     NONFORMULARY, BIOMED COMPOUND FOR KNEE, Disp: , Rfl:     Multiple Vitamins-Minerals (THERAPEUTIC MULTIVITAMIN-MINERALS) tablet, Take 1 tablet by mouth daily, Disp: , Rfl:     Chromium Picolinate 200 MCG CAPS, Take by mouth 2 times daily , Disp: , Rfl:   Allergies:  Sulfa antibiotics  Social History:    reports that she has never smoked. She has never used smokeless tobacco. She reports current alcohol use. She reports that she does not use drugs.   Family History:   Family History   Problem Relation Age of Onset    High Blood Pressure Mother     Stroke Brother     Diabetes Maternal Grandfather     Diabetes Other         NEPHEW    Heart Disease Other     Cancer Other     Breast Cancer Other        REVIEW OF SYSTEMS:   CONSTITUTIONAL: Denies unexplained weight loss, fevers, chills or fatigue  NEUROLOGICAL: Denies unsteady gait or progressive weakness  MUSCULOSKELETAL: Denies joint swelling or redness  GI: Denies nausea, vomiting, diarrhea   : Denies bowel or bladder issues       PHYSICAL EXAM:    Vitals: Height 5' 1\" (1.549 m), weight 211 lb 14.4 oz (96.1 kg), not currently breastfeeding. GENERAL EXAM:  · General Apparence: Patient is adequately groomed with no evidence of malnutrition. · Psychiatric: Orientation: The patient is oriented to time, place and person. The patient's mood and affect are appropriate   · Vascular: Examination reveals no swelling and palpation reveals no tenderness in upper or lower extremities. Good capillary refill. · The lymphatic examination of the neck, axillae and groin reveals all areas to be without enlargement or induration  · Sensation is intact without deficit in the upper and lower extremities to light touch and pinprick  · Coordination of the upper and lower extremities are normal.  · RIGHT UPPER EXTREMITY:  Inspection/examination of the right upper extremity does not show any tenderness, deformity or injury. Range of motion is unremarkable and pain-free. There is no gross instability. There are no rashes, ulcerations or lesions. Strength and tone are normal. No atrophy or abnormal movements are noted. · LEFT UPPER EXTREMITY: Inspection/examination of the left upper extremity does not show any tenderness, deformity or injury. Range of motion is unremarkable and pain-free. There is no gross instability. There are no rashes, ulcerations or lesions. Strength and tone are normal. No atrophy or abnormal movements are noted. LUMBAR/SACRAL EXAMINATION:  · Inspection: Local inspection shows no step-off or bruising. Lumbar alignment is normal. No instability is noted. · Palpation:   No evidence of tenderness at the midline. Lumbar paraspinal tenderness: Mild L4/5 and L5/S1 tenderness  Bursal tenderness No tenderness bilaterally  There is no paraspinal spasm. · Range of Motion: limited by 25% in all planes due to pain - worse with extension   · Strength:   Strength testing is 5/5 in all muscle groups tested. · Special Tests:   Straight leg raise and crossed SLR negative. Herbert's testing is negative bilaterally. FADIR's testing is negative bilaterally. · Skin: There are no rashes, ulcerations or lesions. · Reflexes: Reflexes are symmetrically 2+ at the patellar and ankle tendons. Clonus absent bilaterally at the feet. · Gait & station: normal, patient ambulates without assistance  · Additional Examinations:  · RIGHT LOWER EXTREMITY: Inspection/examination of the right lower extremity does not show any tenderness, deformity or injury. Range of motion is normal and pain-free. There is no gross instability. There are no rashes, ulcerations or lesions. Strength and tone are normal. No atrophy or abnormal movements are noted. · LEFT LOWER EXTREMITY:  Inspection/examination of the left lower extremity does not show any tenderness, deformity or injury. Range of motion is normal and pain-free. There is no gross instability. There are no rashes, ulcerations or lesions. Strength and tone are normal. No atrophy or abnormal movements are noted. Diagnostic Testing:    MR Lumbar spine shows    1. Severe L4-5 central spinal stenosis secondary to concentric bulging disc, ligamentous    hypertrophy and facet arthropathy. 2. Right foraminal protrusion type L3-4 disc herniation projecting into the right L3-4 foramen. 3. Central protrusion type L2-3 disc herniation indenting the anterior thecal sac. 4. Left paracentral protrusion type L1-2 disc herniation projecting into the left L1-2 foramen.    5. Moderate bilateral L4-5 and moderate bilateral L5-S1 foraminal stenosis. 6. 5 mm of anterolisthesis of L4 on L5.        Results for orders placed or performed in visit on 08/02/21   URINE RT REFLEX TO CULTURE    Specimen: Urine, clean catch   Result Value Ref Range    Color, UA Yellow Straw/Yellow    Clarity, UA SL CLOUDY (A) Clear    Glucose, Ur Negative Negative mg/dL    Bilirubin Urine Negative Negative    Ketones, Urine Negative Negative mg/dL    Specific Gravity, UA 1.020 1.005 - 1.030    Blood, Urine Negative Negative    pH, UA 7.0 5.0 - 8.0    Protein, UA Negative Negative mg/dL    Urobilinogen, Urine 1.0 <2.0 E.U./dL    Nitrite, Urine Negative Negative    Leukocyte Esterase, Urine TRACE (A) Negative    Microscopic Examination YES     Urine Type Cleancatch     Urine Reflex to Culture Not Indicated    Lipid Panel   Result Value Ref Range    Cholesterol, Total 188 0 - 199 mg/dL    Triglycerides 56 0 - 150 mg/dL    HDL 59 40 - 60 mg/dL    LDL Calculated 118 (H) <100 mg/dL    VLDL Cholesterol Calculated 11 Not Established mg/dL   Hemoglobin A1C   Result Value Ref Range    Hemoglobin A1C 5.6 See comment %    eAG 114.0 mg/dL   Vitamin D 25 Hydroxy   Result Value Ref Range    Vit D, 25-Hydroxy 33.1 >=30 ng/mL   COMPREHENSIVE METABOLIC PANEL   Result Value Ref Range    Sodium 141 136 - 145 mmol/L    Potassium 4.4 3.5 - 5.1 mmol/L    Chloride 106 99 - 110 mmol/L    CO2 26 21 - 32 mmol/L    Anion Gap 9 3 - 16    Glucose 74 70 - 99 mg/dL    BUN 12 7 - 20 mg/dL    CREATININE 0.6 0.6 - 1.1 mg/dL    GFR Non-African American >60 >60    GFR African American >60 >60    Calcium 9.4 8.3 - 10.6 mg/dL    Total Protein 6.5 6.4 - 8.2 g/dL    Albumin 4.1 3.4 - 5.0 g/dL    Albumin/Globulin Ratio 1.7 1.1 - 2.2    Total Bilirubin 0.4 0.0 - 1.0 mg/dL    Alkaline Phosphatase 36 (L) 40 - 129 U/L    ALT 24 10 - 40 U/L    AST 33 15 - 37 U/L    Globulin 2.4 g/dL   Microscopic Urinalysis   Result Value Ref Range    Mucus, UA 1+ (A) None Seen /LPF    RBC, UA 0-2 0 - 4 /HPF    Bacteria, UA 4+ (A) None Seen /HPF    Urinalysis Comments see below     Hyaline Casts, UA 3 0 - 8 /LPF    WBC, UA 2 0 - 5 /HPF    Epithelial Cells, UA 8 (H) 0 - 5 /HPF     Impression:       1. Spinal stenosis of lumbar region with neurogenic claudication        Plan:  Clinical Course: Above diagnoses are worsening    I discussed the diagnosis and the treatment options with Pat Cueto today. In Summary:  The various treatment options were outlined and discussed with Modesta Rios including:  Conservative care options: physical therapy, ice, medications, bracing, and activity modification. The indications for therapeutic injections. The indications for additional imaging/laboratory studies. The indications for (possible future) interventions. After considering the various options discussed, Pat Cueto elected to pursue a course of treatment that includes the followin. Medications:  I will prescribe naproxen 500 mg BID  to help with inflammation and reduce pain. CV, GI and Nephro risks were reviewed. I will add a gabapentin 300 mg  to the current regimen. Counseled on risks, benefits and alternatives and recommended not to take the medicine and drive or operate heavy machinery. 2. PT:  Encouraged to continue with HEP. 3. Further studies:  Patient requests referral to surgery. 4. Interventional:  We discussed pursuing a bilateral L5 transforaminal epidural steroid injection to address the pain. The patient would like to hold off until she discusses surgery with Dr. Diamante Lepe. [id number] [address] for FMLA up to 3-4 days per month due to chronic pain related to spinal stenosis with neurogenic claudication. 5. Follow up:  As needed      Pat Cueto was instructed to call the office if her symptoms worsen or if new symptoms appear prior to the next scheduled visit.  She is specifically instructed to contact the office between now & her scheduled appointment if she has concerns related to her condition or if she needs assistance in scheduling the above tests. She is welcome to call for an appointment sooner if she has any additional concerns or questions. Rebecca Courtney PA-C   Board Certified by the [employer] on Certification of [address] and Orthopaedics                This dictation was performed with a verbal recognition program [facility]) and it was checked for errors. It is possible that there are still dictated errors within this office note. If so, please bring any errors to my attention for an addendum. All efforts were made to ensure that this office note is accurate.

## 2022-02-25 ENCOUNTER — TELEPHONE (OUTPATIENT)
Dept: ORTHOPEDIC SURGERY | Age: 57
End: 2022-02-25

## 2022-02-25 NOTE — TELEPHONE ENCOUNTER
Faxed completed fmla to [facility] @ [phone]    Received fmla paperwork for completion. Need to contact the patient as some of the pages are missing.

## 2022-02-28 ENCOUNTER — OFFICE VISIT (OUTPATIENT)
Dept: ORTHOPEDIC SURGERY | Age: 57
End: 2022-02-28
Payer: COMMERCIAL

## 2022-02-28 VITALS — BODY MASS INDEX: 39.84 KG/M2 | WEIGHT: 211 LBS | HEIGHT: 61 IN

## 2022-02-28 DIAGNOSIS — M48.062 LUMBAR STENOSIS WITH NEUROGENIC CLAUDICATION: Primary | ICD-10-CM

## 2022-02-28 DIAGNOSIS — M43.16 SPONDYLOLISTHESIS OF LUMBAR REGION: ICD-10-CM

## 2022-02-28 PROCEDURE — 99214 OFFICE O/P EST MOD 30 MIN: CPT | Performed by: ORTHOPAEDIC SURGERY

## 2022-02-28 NOTE — PROGRESS NOTES
New Patient: LUMBAR SPINE    Referring Provider:  VAZQUEZ Mcgill    CHIEF COMPLAINT:    Chief Complaint   Patient presents with    Back Pain     lumbar       HISTORY OF PRESENT ILLNESS:     Ms. Chriss Santos is a pleasant 61 y.o. female here for consultation regarding her LBP. She states her pain began insidiously about 1 year ago. Her pain improved some with PT and an epidural injection about 5 months ago, then increased about 2 weeks ago. She rates her back pain 6/10 and leg pain 0/10. She describes the pain as aching. Pain is worse with sitting or lying down and improved some with standing and walking. At this time, pain is localized to her lower lumbar spine midline and sacrum. She denies lower extremity radicular pain or numbness. She initially had some pain into right buttock/posterior hip but states this resolved with epidural injection. She denies weakness of her legs and denies bowel or bladder dysfunction. The pain occasionally disrupts her sleep. Current/Past Treatment:   · Physical Therapy: 2021-2021  · Chiropractic:  No   · Injection:  PHILIP x1 2021  · Medications:  Naproxen, Neurontin, Lidoderm patch     Past Medical History:   Past Medical History:   Diagnosis Date    Arthritis     Hyperlipidemia     Vitamin D deficiency         Past Surgical History:     Past Surgical History:   Procedure Laterality Date     SECTION      EYE SURGERY Bilateral     EYE SURGERY      FOOT SURGERY Right     MANDIBLE SURGERY      TONSILLECTOMY      TUMOR REMOVAL         Current Medications:     Current Outpatient Medications:     naproxen (NAPROSYN) 500 MG tablet, Take 1 tablet by mouth 2 times daily (with meals), Disp: 60 tablet, Rfl: 0    gabapentin (NEURONTIN) 300 MG capsule, Take 1 capsule by mouth nightly for 30 days.  Intended supply: 30 days, Disp: 30 capsule, Rfl: 0    Cholecalciferol (VITAMIN D3) 50 MCG (2000) CAPS, Take 1 capsule by mouth daily, Disp: 30 capsule, Rfl: 3    fluocinonide (LIDEX) 0.05 % cream, Apply topically 2 times daily / PRN., Disp: 30 g, Rfl: 0    predniSONE (DELTASONE) 10 MG tablet, Take 1 tablet by mouth daily USE THIS SIG->[id number]; [id number]; [id number]; 1pillPOQD (Patient not taking: Reported on 1/17/2022), Disp: 26 tablet, Rfl: 0    naproxen (NAPROSYN) 500 MG tablet, Take 1 tablet by mouth 2 times daily as needed for Pain (Patient not taking: Reported on 1/17/2022), Disp: 20 tablet, Rfl: 0    lidocaine (LIDODERM) 5 %, Place 1 patch onto the skin daily 12 hours on, 12 hours off. (Patient not taking: Reported on 1/17/2022), Disp: 12 patch, Rfl: 0    senna (SENOKOT) 8.6 MG tablet, Take 1 tablet by mouth daily, Disp: , Rfl:     NONFORMULARY, BIOMED COMPOUND FOR KNEE, Disp: , Rfl:     Multiple Vitamins-Minerals (THERAPEUTIC MULTIVITAMIN-MINERALS) tablet, Take 1 tablet by mouth daily, Disp: , Rfl:     Chromium Picolinate 200 MCG CAPS, Take by mouth 2 times daily , Disp: , Rfl:     Allergies:  Sulfa antibiotics    Social History:    reports that she has never smoked. She has never used smokeless tobacco. She reports current alcohol use. She reports that she does not use drugs.     Family History:   Family History   Problem Relation Age of Onset    High Blood Pressure Mother     Stroke Brother     Diabetes Maternal Grandfather     Diabetes Other         NEPHEW    Heart Disease Other     Cancer Other     Breast Cancer Other        REVIEW OF SYSTEMS: Full ROS noted & scanned   CONSTITUTIONAL: Denies unexplained weight loss, fevers, chills or fatigue  NEUROLOGICAL: Denies unsteady gait or progressive weakness  MUSCULOSKELETAL: Denies joint swelling or redness  PSYCHOLOGICAL: Denies anxiety, depression   SKIN: Denies skin changes, delayed healing, rash, itching   HEMATOLOGIC: Denies easy bleeding or bruising  ENDOCRINE: Denies excessive thirst, urination, heat/cold  RESPIRATORY: Denies current dyspnea, cough  GI: Denies nausea, vomiting, diarrhea   : Denies bowel or bladder issues      PHYSICAL EXAM:    Vitals: Height 5' 0.98\" (1.549 m), weight 211 lb (95.7 kg), not currently breastfeeding. GENERAL EXAM:  · General Apparence: Patient is adequately groomed with no evidence of malnutrition. · Orientation: The patient is oriented to time, place and person. · Mood & Affect:The patient's mood and affect are appropriate. · Vascular: Examination reveals no swelling tenderness in upper or lower extremities. Good capillary refill. · Lymphatic: The lymphatic examination bilaterally reveals all areas to be without enlargement or induration  · Sensation: Sensation is intact without deficit  · Coordination/Balance: Good coordination. Tandem walking normal.     LUMBAR/SACRAL EXAMINATION:  · Inspection: Local inspection shows no step-off or bruising. Lumbar alignment is normal.  Sagittal and Coronal balance is neutral.      · Palpation:   No evidence of tenderness at the midline. No tenderness bilaterally at the paraspinal or trochanters. There is no step-off or paraspinal spasm. · Range of Motion: Lumbar flexion, extension and rotation are mildly limited due to pain. · Strength:   Strength testing is 5/5 in all muscle groups tested. · Special Tests:   Straight leg raise and crossed SLR negative. Leg length and pelvis level. · Skin: There are no rashes, ulcerations or lesions. · Reflexes: Reflexes are symmetrically 2+ at the patellar and ankle tendons. Clonus absent bilaterally at the feet. · Gait & station: normal, patient ambulates without assistance    · Additional Examinations:   · RIGHT LOWER EXTREMITY: Inspection/examination of the right lower extremity does not show any tenderness, deformity or injury. Range of motion is unremarkable. There is no gross instability. There are no rashes, ulcerations or lesions.  Strength and tone are normal.  · LEFT LOWER EXTREMITY:  Inspection/examination of the left lower extremity does not show any tenderness, deformity or injury. Range of motion is unremarkable. There is no gross instability. There are no rashes, ulcerations or lesions. Strength and tone are normal.    Diagnostic Testing:    I reviewed MRI images of her lumbar spine from 8/17/21. They show grade 1 spondylolisthesis with severe stenosis L4-5, and a right foraminal disc protrusion L3-4 and left paracentral disc protrusion L1-2. Impression:   Lumbar stenosis with neurogenic claudication    Plan:    We discussed treatment options including observation, additional oral steroids, physical therapy, epidural injections and additional imaging. She wishes to proceed with a 2nd epidural injection or medial branch block. Patient examined and note dictated by Farooq Santamaria PA-C. Patient also seen and examined by Dr. Rosa Giang.

## 2022-03-15 DIAGNOSIS — R00.2 PALPITATIONS: ICD-10-CM

## 2022-03-16 LAB — TSH REFLEX: 1.22 UIU/ML (ref 0.27–4.2)

## 2022-05-11 NOTE — PROGRESS NOTES
Subjective:      Patient ID: Mariana Lewis is a 46 y.o. female who presents for problem visit c/oedema of right thumb x 1 week. Rates pain 6/10. Patient denies any injury or trauma to affected area. Patient past medical history, family history, social, and smoking reviewed and updated as pertinent. Health maintenance has been reviewed. History obtained from thepatient. Chief Complaint   Patient presents with    Other     pt here for swolling thumb for about a week pt also wants hep a        Vitals:    05/09/19 1324   BP: 112/80   Pulse: 66   Resp: 18   Temp: 98.1 °F (36.7 °C)   SpO2: 96%       Current Outpatient Medications   Medication Sig Dispense Refill    indomethacin (INDOCIN) 50 MG capsule Take 1 capsule by mouth 3 times daily for 10 days 30 capsule 0    Cholecalciferol (VITAMIN D3) 1000 units TABS Take 1 tablet by mouth daily 30 tablet 3    triamcinolone (KENALOG) 0.025 % cream Apply Topically to rash twice daily as needed. 1 g 2    Multiple Vitamins-Minerals (THERAPEUTIC MULTIVITAMIN-MINERALS) tablet Take 1 tablet by mouth daily      Probiotic Product (PROBIOTIC DAILY) CAPS Take by mouth      Chromium Picolinate 200 MCG CAPS Take by mouth daily      FIBER ADULT GUMMIES PO Take by mouth daily       No current facility-administered medications for this visit. Orders Placed This Encounter   Procedures    Uric Acid     Standing Status:   Future     Standing Expiration Date:   5/9/2020   Joanie Osborne MD, Hand Surgery (Hand, Wrist, Upper Extremity), [facility]     Referral Priority:   Routine     Referral Type:   Eval and Treat     Referral Reason:   Specialty Services Required     Requested Specialty:   Orthopedic Surgery     Number of Visits Requested:   1       Other   This is a new (Edema/ pain  of right thumb ) problem. The current episode started 1 to 4 weeks ago. The problem occurs constantly. The problem has been gradually worsening.  Associated symptoms include arthralgias. Nothing aggravates the symptoms. She has tried NSAIDs for the symptoms. Review of Systems   Constitutional: Negative. Musculoskeletal: Positive for arthralgias. Skin: Negative. Psychiatric/Behavioral: Negative. All other systems reviewed and are negative. Objective:     Physical Exam   Constitutional: She appears well-developed and well-nourished. She is active and cooperative. She is easily aroused. Non-toxic appearance. She does not have a sickly appearance. She does not appear ill. No distress. Neck: Trachea normal, normal range of motion, full passive range of motion without pain and phonation normal. Neck supple. Cardiovascular: Normal rate. Pulmonary/Chest: Effort normal.   Neurological: She is alert and easily aroused. Skin: Skin is warm and dry. Capillary refill takes less than 2 seconds. She is not diaphoretic. No pallor. Psychiatric: She has a normal mood and affect. Her speech is normal and behavior is normal. Thought content normal.   Nursing note and vitals reviewed. Assessment:     Edema of right thumb, worsening. Dietary counseling    Dangelo Garcia on the following healthy behaviors: nutrition, exercise and medication adherence    Patient given educational materials on Nutrition, Exercise and Joint swelling      I have instructed Modesta to complete a self tracking handouton Weights and instructed them to bring it with them to her next appointment. Discussed use,benefit, and side effects of prescribed medications. Barriers to medication compliance addressed. All patient questions answered. Pt voiced understanding. Patient is in no acute distress at time of departure. Plan:      DIET / EXERCISE     PATIENT TO SCHEDULE APPOINTMENT WITH ORTHOPAEDIC SPECIALIST. LAB    CALL 911 OR GO TO THE NEAREST EMERGENCY ROOM FOR CHEST PAIN / SHORTNESS OF BREATH. RETURN IN 3 MONTHS FOR F/U Vitamin D def / AS NEEDED. no

## 2022-06-16 ENCOUNTER — TELEPHONE (OUTPATIENT)
Dept: OTHER | Facility: CLINIC | Age: 57
End: 2022-06-16

## 2022-06-16 NOTE — TELEPHONE ENCOUNTER
Yared Ann, Was contacted today as part of [address] [facility] to schedule a mammogram.         Left VM for pt to return call to this nurse regarding routine health screenings. [employer] message also sent.            [facility], LPN

## 2022-08-25 ENCOUNTER — OFFICE VISIT (OUTPATIENT)
Dept: ORTHOPEDIC SURGERY | Age: 57
End: 2022-08-25
Payer: COMMERCIAL

## 2022-08-25 VITALS — HEIGHT: 62 IN | WEIGHT: 210 LBS | BODY MASS INDEX: 38.64 KG/M2

## 2022-08-25 DIAGNOSIS — M25.512 LEFT SHOULDER PAIN, UNSPECIFIED CHRONICITY: Primary | ICD-10-CM

## 2022-08-25 DIAGNOSIS — M75.82 ROTATOR CUFF TENDONITIS, LEFT: ICD-10-CM

## 2022-08-25 PROCEDURE — 20610 DRAIN/INJ JOINT/BURSA W/O US: CPT | Performed by: PHYSICIAN ASSISTANT

## 2022-08-25 RX ORDER — BUPIVACAINE HYDROCHLORIDE 2.5 MG/ML
2 INJECTION, SOLUTION INFILTRATION; PERINEURAL ONCE
Status: COMPLETED | OUTPATIENT
Start: 2022-08-25 | End: 2022-08-25

## 2022-08-25 RX ORDER — TRIAMCINOLONE ACETONIDE 40 MG/ML
40 INJECTION, SUSPENSION INTRA-ARTICULAR; INTRAMUSCULAR ONCE
Status: COMPLETED | OUTPATIENT
Start: 2022-08-25 | End: 2022-08-25

## 2022-08-25 RX ADMIN — BUPIVACAINE HYDROCHLORIDE 5 MG: 2.5 INJECTION, SOLUTION INFILTRATION; PERINEURAL at 14:04

## 2022-08-25 RX ADMIN — TRIAMCINOLONE ACETONIDE 40 MG: 40 INJECTION, SUSPENSION INTRA-ARTICULAR; INTRAMUSCULAR at 14:04

## 2022-08-25 NOTE — PROGRESS NOTES
Yes     Comment: Rare    Drug use: No    Sexual activity: Not Currently       Current Outpatient Medications   Medication Sig Dispense Refill    naproxen (NAPROSYN) 500 MG tablet Take 1 tablet by mouth 2 times daily (with meals) 60 tablet 0    Cholecalciferol (VITAMIN D3) 50 MCG (2000 UT) CAPS Take 1 capsule by mouth daily 30 capsule 3    fluocinonide (LIDEX) 0.05 % cream Apply topically 2 times daily / PRN. 30 g 0    naproxen (NAPROSYN) 500 MG tablet Take 1 tablet by mouth 2 times daily as needed for Pain 20 tablet 0    lidocaine (LIDODERM) 5 % Place 1 patch onto the skin daily 12 hours on, 12 hours off. 12 patch 0    senna (SENOKOT) 8.6 MG tablet Take 1 tablet by mouth daily      NONFORMULARY BIOMED COMPOUND FOR KNEE      Multiple Vitamins-Minerals (THERAPEUTIC MULTIVITAMIN-MINERALS) tablet Take 1 tablet by mouth daily      Chromium Picolinate 200 MCG CAPS Take by mouth 2 times daily       gabapentin (NEURONTIN) 300 MG capsule Take 1 capsule by mouth nightly for 30 days. Intended supply: 30 days 30 capsule 0    predniSONE (DELTASONE) 10 MG tablet Take 1 tablet by mouth daily USE THIS SIG->0vdzadPLPQXr3sfeq; 9pjydtOZBDQl5xfij; 7ltyyJERTCd0kznm; 1pillPOQD (Patient not taking: Reported on 8/25/2022) 26 tablet 0     No current facility-administered medications for this visit. Objective:     She is alert, oriented x 3, pleasant, well nourished, developed and in no   acute distress. Ht 5' 2\" (1.575 m)   Wt 210 lb (95.3 kg)   BMI 38.41 kg/m²      Examination of the left shoulder: There is no deformity. There is no erythema or soft tissue swelling. There IS/IS NOT:[id number]} significant joint effusion. Deltoid region is  tender to palpation. AC Joint is not tender to palpation. Shoulder Active ROM -      IR to L5 ER w/ arm at side 60. Shoulder Passive ROM-    IR to L1 ER w/ arm at side 70. Belly Press Test positive. Bear Hug Test positive. Drop Arm Test negative.   Cross Arm Abduction Test negative. Supraspinatus strength 4+/5. Upper extremities:     Her biceps, triceps, bracheoradialis, quadriceps and achilles reflexes are 2+, bilaterally. Sensation is intact to light touchfrom C6 to C8. She has no clonus and negative Peña's bilaterally. Examination of the upper extremities shows intact perfusion to all extremities. She has no cyanosis and digigts are warm to touch, capillary refill is less than 2 seconds. She has no edema noted. She has intact skin without lacerations or abrasions, no significant erythema, rashes or skin lesions. X Rays: were performed in the office today:   AP, Y and Axillary views of the left shoulder. Normal radiographic examination. There is no evidence of fracture or dislocation. The subacromial space is normal in appearance without narrowing. There is not evidence of AC joint arthritis. There is not evidence of Gleno-Humeral arthritis. Additional Tests reviewed: none  Additional Outside Records reviewed: none    Diagnosis:       ICD-10-CM    1. Left shoulder pain, unspecified chronicity  M25.512 XR SHOULDER LEFT (MIN 2 VIEWS)      2. Rotator cuff tendonitis, left  M75.82 TN ARTHROCENTESIS ASPIR&/INJ MAJOR JT/BURSA W/O US     bupivacaine (MARCAINE) 0.25 % injection 5 mg     triamcinolone acetonide (KENALOG-40) injection 40 mg           Assessment and Plan:       Assessment:  Left shoulder pain felt to be related to rotator cuff tendinitis possible partial rotator cuff tear. I had an extensive discussion with Ms. Jillian You regarding the natural history, etiology, and long term consequences of her condition. I have presented reasonable alternatives to the patient's proposed care, treatment, and services. Risks and benefits of the treatment options also reviewed in detail. I have outlined a treatment plan with them. She has had full opportunity to ask her questions. I have answered them all to her satisfaction.   I feel that Ms. Cedric Hassan understands our discussion today     Plan:  Medications-   OTC NSAIDS discussed. The most common side effects from NSAIDs are stomachaches, heartburn, and nausea. NSAIDs may irritate the stomach lining. If the medicine upsets your stomach, you can try taking it with food. But if that doesn't help, talk with your doctor to make sure it's not a more serious problem, such as a stomach ulcer or bleeding in the stomach or intestines. Using NSAIDs may:  Lead to high blood pressure. Make symptoms of heart failure worse. Raise the risk of heart attack, stroke, kidney damage, and skin reactions. Your risks are greater if you take NSAIDs at higher doses or for longer than the label says. People who are older than 72 or who have heart, stomach, or intestinal disease have a higher risk for problems. PT- A home exercise program was instructed today including ROM exercises and strengthening exercises. The patient verbalized understanding of these exercises as well as the importance of the exercise program to promote return of normal function. If pain intensifies or other problems arise you are to notify the office. Further Imaging-discussed possible MRI if symptoms fail to improve with conservative treatment. Procedures- Cortisone  Injection  PROCEDURE NOTE:  Pre op Diagnosis: Shoulder pain    With Cedric Hassan permission, her left shoulder was prepped  in standard sterile fashion with Alcohol. 2 cc of 0.25% Marcaine and 1 cc of Kenalog 40 mg was injected into the left lateral subacromial space without difficulty. She  tolerated this well without difficulty. A band-aid was applied. She was advised to ice the shoulder for 15-20 minutes to relieve any injection site related pain. The total time spent on today's visit including reviewing test results, history, performance of physical exam, counseling/ education, ordering of medications, tests or procedures was 35 minutes.   This time does include completion of the medical record. This time excludes any time spent performing procedures or tests in the office. Follow up- 4 weeks. Call or return to clinic if these symptoms worsen or fail to improve as anticipated. Hieu Garcia PA-C   Senior Physician Assistant   [facility]                                         Disclaimer: This note was generated with use of a verbal recognition program (DRAGON) and an attempt was made to check for errors. It is possible that there are still dictated errors within this office note. If so, please bring any significant errors to my attention for an addendum. All efforts were made to ensure that this office note is accurate.

## 2022-08-31 ENCOUNTER — OFFICE VISIT (OUTPATIENT)
Dept: ORTHOPEDIC SURGERY | Age: 57
End: 2022-08-31
Payer: COMMERCIAL

## 2022-08-31 VITALS — WEIGHT: 210 LBS | HEIGHT: 62 IN | BODY MASS INDEX: 38.64 KG/M2

## 2022-08-31 DIAGNOSIS — M65.319 ACQUIRED TRIGGER THUMB: Primary | ICD-10-CM

## 2022-08-31 PROBLEM — M65.30 TRIGGER FINGER, ACQUIRED: Status: ACTIVE | Noted: 2022-08-31

## 2022-08-31 PROCEDURE — 99213 OFFICE O/P EST LOW 20 MIN: CPT | Performed by: ORTHOPAEDIC SURGERY

## 2022-08-31 PROCEDURE — 20550 NJX 1 TENDON SHEATH/LIGAMENT: CPT | Performed by: ORTHOPAEDIC SURGERY

## 2022-08-31 RX ORDER — TRIAMCINOLONE ACETONIDE 40 MG/ML
20 INJECTION, SUSPENSION INTRA-ARTICULAR; INTRAMUSCULAR ONCE
Status: COMPLETED | OUTPATIENT
Start: 2022-08-31 | End: 2022-08-31

## 2022-08-31 RX ORDER — LIDOCAINE HYDROCHLORIDE 10 MG/ML
0.5 INJECTION, SOLUTION INFILTRATION; PERINEURAL ONCE
Status: COMPLETED | OUTPATIENT
Start: 2022-08-31 | End: 2022-08-31

## 2022-08-31 RX ADMIN — LIDOCAINE HYDROCHLORIDE 0.5 ML: 10 INJECTION, SOLUTION INFILTRATION; PERINEURAL at 12:41

## 2022-08-31 RX ADMIN — TRIAMCINOLONE ACETONIDE 20 MG: 40 INJECTION, SUSPENSION INTRA-ARTICULAR; INTRAMUSCULAR at 12:42

## 2022-08-31 NOTE — PROGRESS NOTES
I last examined this patient 7 months ago at which time I injected the right thumb for treatment of trigger finger. She obtained prompt and complete relief of all symptoms. Unfortunately, the condition has recurred and the patient returns to the office requesting additional treatment. She complains of pain, swelling, catching and stiffness of the digit for the past 1 month. Symptoms have become worse recently. The patient's social history, past medical history, family history, medications, allergies and review of systems have been reviewed, dated 1/17/22 and are recorded in the chart. I have personally examined and reviewed all previous imaging studies, laboratory tests and medical encounters pertinent to this patient's visit today. On examination there is mild soft tissue swelling of the digit. There is no deformity. There is tenderness, thickening and nodularity at the base of the flexor tendon sheath. Range of motion is slightly limited in flexion and extension. The digit sticks in flexion and pops into extension, accompanied by some pain. Skin is intact without lesions. Distal circulation and sensation are intact. Muscle strength and coordination are normal.  Reflexes and present bilaterally. Joints are stable. There are no subcutaneous nodules or enlarged epitrochlear lymph nodes. Impression:  Recurrent right thumb trigger digit. The nature of this medical problem is fully discussed with the patient, including all treatment options. All questions are answered. The right  hand is prepared with Betadine and alcohol and the flexor tendon sheath of the right thumb is injected with 1/2 milliliter of 1% lidocaine and 20 mg.of triamcinalone, with good filling. The patient is advised regarding the expected response and possible reactions from the injection. Acetaminophen or ibuprofen are prescribed for any significant post injection pain.   The patient is asked to call me if full,

## 2022-09-08 ENCOUNTER — TELEPHONE (OUTPATIENT)
Dept: ORTHOPEDIC SURGERY | Age: 57
End: 2022-09-08

## 2022-09-08 ENCOUNTER — OFFICE VISIT (OUTPATIENT)
Dept: ORTHOPEDIC SURGERY | Age: 57
End: 2022-09-08
Payer: COMMERCIAL

## 2022-09-08 VITALS — HEIGHT: 62 IN | WEIGHT: 210 LBS | BODY MASS INDEX: 38.64 KG/M2 | RESPIRATION RATE: 18 BRPM

## 2022-09-08 DIAGNOSIS — G56.02 CARPAL TUNNEL SYNDROME OF LEFT WRIST: ICD-10-CM

## 2022-09-08 DIAGNOSIS — M75.82 ROTATOR CUFF TENDONITIS, LEFT: Primary | ICD-10-CM

## 2022-09-08 PROCEDURE — 99213 OFFICE O/P EST LOW 20 MIN: CPT | Performed by: PHYSICIAN ASSISTANT

## 2022-09-08 RX ORDER — METHYLPREDNISOLONE 4 MG/1
TABLET ORAL
Qty: 1 KIT | Refills: 0 | Status: SHIPPED | OUTPATIENT
Start: 2022-09-08

## 2022-09-08 NOTE — PROGRESS NOTES
Subjective:      Patient ID: Pantera Ortiz is a 61 y.o. female who is here for follow up evaluation of left shoulder pain. She has had symptoms of left shoulder pain for approximately 1 year. She was initially seen on 2022 at that time diagnosed with left shoulder rotator cuff tendinitis. She underwent a left shoulder subacromial injection on that visit. She is here today for follow-up. She states that the left shoulder pain has significantly improved since steroid injection. 2 to 3 days after injection she began to notice numbness and tingling in the left upper extremity radiating down into her left hand. She denies any new injury or other complaints. She does have occasional neck pain which has been there for some time. She denies weakness in the upper extremity. Review Of Systems:   Negative for fever or chills. Negative for poly-joint pain, swelling and stiffness. Past Medical History:   Diagnosis Date    Arthritis     Hyperlipidemia     Vitamin D deficiency        Family History   Problem Relation Age of Onset    High Blood Pressure Mother     Stroke Brother     Diabetes Maternal Grandfather     Diabetes Other         NEPHEW    Heart Disease Other     Cancer Other     Breast Cancer Other        Past Surgical History:   Procedure Laterality Date     SECTION      EYE SURGERY Bilateral     EYE SURGERY      FOOT SURGERY Right     MANDIBLE SURGERY      TONSILLECTOMY      TUMOR REMOVAL  1964       Social History     Occupational History    Occupation: Health Unit Coordinator     Employer: [employer]     Comment: Fulltime   Tobacco Use    Smoking status: Never    Smokeless tobacco: Never   Substance and Sexual Activity    Alcohol use: Yes     Comment: Rare    Drug use: No    Sexual activity: Not Currently       Current Outpatient Medications   Medication Sig Dispense Refill    methylPREDNISolone (MEDROL, DARRIAN,) 4 MG tablet Take by mouth.  6 po day one 5 po day 2 4 po day 3 3 po day 4 2 po day 5 1 po day 6. 1 kit 0    naproxen (NAPROSYN) 500 MG tablet Take 1 tablet by mouth 2 times daily (with meals) 60 tablet 0    gabapentin (NEURONTIN) 300 MG capsule Take 1 capsule by mouth nightly for 30 days. Intended supply: 30 days 30 capsule 0    Cholecalciferol (VITAMIN D3) 50 MCG (2000 UT) CAPS Take 1 capsule by mouth daily 30 capsule 3    fluocinonide (LIDEX) 0.05 % cream Apply topically 2 times daily / PRN. 30 g 0    predniSONE (DELTASONE) 10 MG tablet Take 1 tablet by mouth daily USE THIS SIG->[id number]; 1jwparYPIZFy1skup; 2ywhdWVKARz5wvge; 1pillPOQD (Patient not taking: Reported on 8/25/2022) 26 tablet 0    naproxen (NAPROSYN) 500 MG tablet Take 1 tablet by mouth 2 times daily as needed for Pain 20 tablet 0    lidocaine (LIDODERM) 5 % Place 1 patch onto the skin daily 12 hours on, 12 hours off. 12 patch 0    senna (SENOKOT) 8.6 MG tablet Take 1 tablet by mouth daily      NONFORMULARY BIOMED COMPOUND FOR KNEE      Multiple Vitamins-Minerals (THERAPEUTIC MULTIVITAMIN-MINERALS) tablet Take 1 tablet by mouth daily      Chromium Picolinate 200 MCG CAPS Take by mouth 2 times daily        No current facility-administered medications for this visit. Objective:     She is alert, oriented x 3, pleasant, well nourished, developed and in no   acute distress. Resp 18   Ht 5' 2\" (1.575 m)   Wt 210 lb (95.3 kg)   BMI 38.41 kg/m²      Left Shoulder   Active ROM:   forward flexion 160-180, external rotation 90, internal rotation T-8.     Passive ROM:  forward flexion 160-180    Joint Tenderness:   none   Neer:   negative   Kaur:   negative   Strength:   5/5   Drop-arm test:   negative   Belly-press test:   negative   Bear-hug test:   negative   Speed's test:   negative   Bicipital groove tenderness:  negative   Lyn's test:   negative   Cross-body adduction test:   negative    AC joint tenderness:   negative   Scapular dyskinesis:   absent   Atrophy:   none noted Cervical Spine Exam:  There is not deformity. There is not loss of motion. There is not muscular spasm. There is not trapezius/ rhomboid tenderness. There is not spinous process tenderness. Spurling Test is Negative. Upper extremities:  She has 5/5 strength of her interosseous muscles, wrist dorsiflexors and volarflexors, biceps, triceps, deltoids, and internal and external rotators of her shoulders, bilaterally. Her biceps, triceps, bracheoradialis, quadriceps and achilles reflexes are 2+, bilaterally. Sensation is intact to light touchfrom C6 to C8. She has no clonus and negative Peña's bilaterally. Phalen's and Tinel's at the left wrist positive. Tinel's left elbow negative. Adson's test negative. Examination of the upper extremities shows intact perfusion to all extremities. She has no cyanosis and digigts are warm to touch, capillary refill is less than 2 seconds. She has no edema noted. She has intact skin without lacerations or abrasions, no significant erythema, rashes or skin lesions. X Rays: not performed in the office today:       Diagnosis:       ICD-10-CM    1. Rotator cuff tendonitis, left  M75.82       2. Carpal tunnel syndrome of left wrist  G56.02            Assessment and Plan:       Assessment:  Left shoulder rotator cuff tendinitis improved with recent left shoulder subacromial steroid injection performed 8/25/2022. New onset left upper extremity numbness and tingling. May be carpal tunnel but also could be cervical radicular nature. I had an extensive discussion with Ms. Puja Alcantara regarding the natural history, etiology, and long term consequences of her condition. I have presented reasonable alternatives to the patient's proposed care, treatment, and services. Risks and benefits of the treatment options also reviewed in detail. I have outlined a treatment plan with them. She has had full opportunity to ask her questions.   I have answered them all to her satisfaction. I feel that Ms. Bety See understands our discussion today. Plan:  Medications-Medrol Dosepak. Procedures-discussed use of cock up wrist splint at night. Will consider EMG left upper extremity if symptoms persist.      Follow up- 3-4 weeks. Inocencio Bishop PA-C   Senior Physician Assistant   [facility]                                         Disclaimer: This note was generated with use of a verbal recognition program (DRAGON) and an attempt was made to check for errors. It is possible that there are still dictated errors within this office note. If so, please bring any significant errors to my attention for an addendum. All efforts were made to ensure that this office note is accurate.

## 2023-01-09 ENCOUNTER — OFFICE VISIT (OUTPATIENT)
Dept: INTERNAL MEDICINE CLINIC | Age: 58
End: 2023-01-09
Payer: COMMERCIAL

## 2023-01-09 VITALS
BODY MASS INDEX: 39.46 KG/M2 | WEIGHT: 209 LBS | OXYGEN SATURATION: 99 % | DIASTOLIC BLOOD PRESSURE: 80 MMHG | HEIGHT: 61 IN | HEART RATE: 61 BPM | SYSTOLIC BLOOD PRESSURE: 136 MMHG | TEMPERATURE: 97.3 F

## 2023-01-09 DIAGNOSIS — Z00.00 PHYSICAL EXAM: Primary | ICD-10-CM

## 2023-01-09 DIAGNOSIS — Z12.31 SCREENING MAMMOGRAM FOR BREAST CANCER: ICD-10-CM

## 2023-01-09 DIAGNOSIS — E66.01 SEVERE OBESITY (HCC): ICD-10-CM

## 2023-01-09 DIAGNOSIS — M17.10 ARTHRITIS OF KNEE: ICD-10-CM

## 2023-01-09 DIAGNOSIS — E78.49 OTHER HYPERLIPIDEMIA: ICD-10-CM

## 2023-01-09 DIAGNOSIS — Z01.30 BP CHECK: ICD-10-CM

## 2023-01-09 DIAGNOSIS — M54.50 CHRONIC BILATERAL LOW BACK PAIN WITHOUT SCIATICA: ICD-10-CM

## 2023-01-09 DIAGNOSIS — E55.9 VITAMIN D DEFICIENCY: ICD-10-CM

## 2023-01-09 DIAGNOSIS — G89.29 CHRONIC BILATERAL LOW BACK PAIN WITHOUT SCIATICA: ICD-10-CM

## 2023-01-09 DIAGNOSIS — R00.2 PALPITATIONS: ICD-10-CM

## 2023-01-09 LAB
BILIRUBIN, POC: NEGATIVE
BLOOD URINE, POC: NEGATIVE
CLARITY, POC: NORMAL
COLOR, POC: NORMAL
GLUCOSE URINE, POC: NEGATIVE
KETONES, POC: NORMAL
LEUKOCYTE EST, POC: NEGATIVE
NITRITE, POC: NEGATIVE
PH, POC: 6.5
PROTEIN, POC: NEGATIVE
SPECIFIC GRAVITY, POC: 1.02
UROBILINOGEN, POC: NORMAL

## 2023-01-09 PROCEDURE — 81002 URINALYSIS NONAUTO W/O SCOPE: CPT | Performed by: INTERNAL MEDICINE

## 2023-01-09 PROCEDURE — 93000 ELECTROCARDIOGRAM COMPLETE: CPT | Performed by: INTERNAL MEDICINE

## 2023-01-09 PROCEDURE — 99396 PREV VISIT EST AGE 40-64: CPT | Performed by: INTERNAL MEDICINE

## 2023-01-09 RX ORDER — ACETAMINOPHEN 160 MG
1 TABLET,DISINTEGRATING ORAL DAILY
Qty: 30 CAPSULE | Refills: 3 | Status: SHIPPED | OUTPATIENT
Start: 2023-01-09

## 2023-01-09 SDOH — ECONOMIC STABILITY: FOOD INSECURITY: WITHIN THE PAST 12 MONTHS, YOU WORRIED THAT YOUR FOOD WOULD RUN OUT BEFORE YOU GOT MONEY TO BUY MORE.: NEVER TRUE

## 2023-01-09 SDOH — ECONOMIC STABILITY: FOOD INSECURITY: WITHIN THE PAST 12 MONTHS, THE FOOD YOU BOUGHT JUST DIDN'T LAST AND YOU DIDN'T HAVE MONEY TO GET MORE.: NEVER TRUE

## 2023-01-09 ASSESSMENT — PATIENT HEALTH QUESTIONNAIRE - PHQ9
SUM OF ALL RESPONSES TO PHQ QUESTIONS 1-9: 0
SUM OF ALL RESPONSES TO PHQ9 QUESTIONS 1 & 2: 0
1. LITTLE INTEREST OR PLEASURE IN DOING THINGS: 0
SUM OF ALL RESPONSES TO PHQ QUESTIONS 1-9: 0
2. FEELING DOWN, DEPRESSED OR HOPELESS: 0
SUM OF ALL RESPONSES TO PHQ QUESTIONS 1-9: 0
SUM OF ALL RESPONSES TO PHQ QUESTIONS 1-9: 0
DEPRESSION UNABLE TO ASSESS: FUNCTIONAL CAPACITY MOTIVATION LIMITS ACCURACY

## 2023-01-09 ASSESSMENT — SOCIAL DETERMINANTS OF HEALTH (SDOH): HOW HARD IS IT FOR YOU TO PAY FOR THE VERY BASICS LIKE FOOD, HOUSING, MEDICAL CARE, AND HEATING?: NOT HARD AT ALL

## 2023-01-09 NOTE — PATIENT INSTRUCTIONS
TAKE MED AS ADVISED    DIET/ EXERCISE. FOLLOW UP WITHIN 4 MONTHS / AS NEEDED    FOLLOW UP FOR FASTING LABS, MAMMOGRAM    [facility] Locations - No appointment necessary. @ indicates the location is open Saturdays in addition to Monday through Friday. Call your preferred location for test preparation, business hours and other information you need. [employer] accepts [employer]. [facility]    @ [place] [facility] [address] [facility] [id number] [place] [address]. [place], [address]   Ph: [phone] [facility] Svcs. [address]   Ph: [phone]  @ [facility] [address] [facility]   Ph: [phone]    [facility] S[facility]. [address]   Ph: [phone] @ [place] [facility] [address]  Ph: [phone] @ [facility] S[facility]. [address] [facility] [address]aco G[address]   Ph: [phone]     [facility]. [place] [address]  Ph: [phone]    [place]   @ [place] [facility] [address] [place], [place] [address]   Ph: [phone] [facility] [address] [facility], [address] [place] [address]  Ph: [phone] [facility] [id number] [place] [address] [address] Lab Sv[address] [facility]   Ph: [phone] [facility]. Lab Svcs.   [address] [facility], [facility] [facility]   Ph: [phone]

## 2023-01-09 NOTE — PROGRESS NOTES
SUBJECTIVE:  Elkin Mendes is a 61 y.o. female HERE FOR   Chief Complaint   Patient presents with    Annual Exam      PT HERE FOR EVAL / PHYSICAL   LAST SEEN 2021    LAST PHYSICAL > 1 YR. LABS D/W PT         HLP - NOT ON MED. ? EXERCISE / DIET COMPLIANCE . LAB D/W PT   PALPITATIONS -  NO RECURRENCE. NO SYNCOPE  LOW BACK PAIN - PERSISTENT. SHARP PAIN. STATES RADHA, ? RAD . PAIN SCALE 8/10. NO NUMBNESS / NO TINGLING. FOLLOWING WITH ORTHO  ARTHRITIS - RADHA KNEES MOSTLY LT .  SHARP PAIN, ? RAD, PAIN SCALE 6/10. NO SWELLING. NO RECENT TRAUMA. FOLLOWING WITH ORTHO   VIT D DEF -  ? MED COMPLIANCE. PREVIOUS LABS D/W PT  SEVERE OBESITY - DIET / EXERCISE REVIEWED. WT NOTED  SCREENING BREAST CA - MAMMOGRAM D/W PT  BP CHECK - RAE NOTED. NO H/O HTN. NO HA, NO DIZZINESS     DENIES CP,  No SOB, NO PALPITATIONS, No COUGH, NO F/C  No ABD PAIN, No N/V, No DIARRHEA, No CONSTIPATION, No MELENA, No HEMATOCHEZIA. No DYSURIA, No FREQ, No URGENCY, No HEMATURIA       PMH: REVIEWED AND UPDATED TODAY    PSH: REVIEWED AND UPDATED TODAY    SOCIAL HX: REVIEWED AND UPDATED TODAY    FAMILY HX: REVIEWED AND UPDATED TODAY    ALLERGY:  Sulfa antibiotics    MEDS: REVIEWED  Prior to Visit Medications    Medication Sig Taking? Authorizing Provider   naproxen (NAPROSYN) 500 MG tablet Take 1 tablet by mouth 2 times daily (with meals) Yes VAZQUEZ Jose   gabapentin (NEURONTIN) 300 MG capsule Take 1 capsule by mouth nightly for 30 days. Intended supply: 30 days Yes VAZQUEZ Marin   Cholecalciferol (VITAMIN D3) 50 MCG (2000 UT) CAPS Take 1 capsule by mouth daily Yes Marija Alcala MD   fluocinonide (LIDEX) 0.05 % cream Apply topically 2 times daily / PRN. Yes Marija Alcala MD   naproxen (NAPROSYN) 500 MG tablet Take 1 tablet by mouth 2 times daily as needed for Pain Yes Marija Alcala MD   lidocaine (LIDODERM) 5 % Place 1 patch onto the skin daily 12 hours on, 12 hours off.  Yes Michelle Blank MD   senna (SENOKOT) 8.6 MG tablet Take 1 tablet by mouth daily Yes Historical Provider, MD   [facility] [address] Yes Historical Provider, MD   Multiple Vitamins-Minerals (THERAPEUTIC MULTIVITAMIN-MINERALS) tablet Take 1 tablet by mouth daily Yes Historical Provider, MD   Chromium Picolinate 200 MCG CAPS Take by mouth 2 times daily  Yes Historical Provider, MD   methylPREDNISolone (MEDROL, DARRIAN,) 4 MG tablet Take by mouth. 6 po day one 5 po day 2 4 po day 3 3 po day 4 2 po day 5 1 po day 6. VAZQUEZ Sampson   predniSONE (DELTASONE) 10 MG tablet Take 1 tablet by mouth daily USE THIS SIG->[id number]; 0fxrzcPYTSZa7sgic; 1sfsqEJCOOg6ndod; 1pillPOQD  Patient not taking: Reported on 8/25/2022  VAZQUEZ Mattson       ROS: COMPREHENSIVE ROS AS IN HX, REST -VE  History obtained from chart review and the patient       OBJECTIVE:   NURSING NOTE AND VITALS REVIEWED  BP (!) 140/89 (Site: Left Upper Arm, Position: Sitting, Cuff Size: Large Adult)   Pulse 61   Temp 97.3 °F (36.3 °C) (Oral)   Ht 5' 1\" (1.549 m)   Wt 209 lb (94.8 kg)   SpO2 99%   BMI 39.49 kg/m²     NO ACUTE DISTRESS    REPEAT BP: 136/80 (LT), NO ORTHOSTASIS     Body mass index is 39.49 kg/m². WAIST CIRCUMFERENCE: 42    HEENT: NO PALLOR, ANICTERIC, PERRLA, EOMI, NO CONJUNCTIVAL ERYTHEMA,                 NO SINUS TENDERNESS, NO CERUMEN IMPACTION, NO TM ERYTHEMA  NECK:  SUPPLE, TRACHEA MIDLINE, NT, NO JVD, NO CB, NO LA, NO TM, NO STIFFNESS  CHEST: RESPY EFFORT NL, GOOD AE, NO W/R/C  HEART: S1S2+ REG, NO M/G/R  ABD: OBESE, SOFT, NT, NO HSM, BS+  EXT: NO EDEMA, NT, PULSES +.  JOSE MATRIN'S -VE  NEURO: ALERT AND ORIENTED X 3, NO MENINGEAL SIGNS, NO TREMORS, NL GAIT, NO FOCAL DEFICITS  PSYCH: FAIRLY GOOD AFFECT  BACK: NT, OCC PAIN WITH MVT, NO ROM, NO CVA TENDERNESS  KNEE NO SWELLING, NT, + PAIN WITH MVT - LT, OCC ROM - LT       PREVIOUS LABS REVIEWED AND D/W PT    UA: TRACE KETONES OTHERWISE NEGATIVE FOR UTI    EKG: SINUS RHYTHM HR 62, NO ACUTE FINDINGS    ASSESSMENT / PLAN:     Diagnosis Orders   1. Physical exam  COUNSELLED. HEALTH / SAFETY EDUCATION REVIEWED AND ADVISED. EKG DONE - UNREMARKABLE  HEALTH MAINTENANCE UPDATED  F/U FASTING LABS - HIV WITH LABS - OK WITH PT  IMMUNIZATION REVIEWED - READDRESS  ADVISED ON OVERALL HEALTH AND WELLNESS  MAKE CHANGES AS NEEDED. 2. Other hyperlipidemia  COUNSELLED. ADVISED LOW FAT / CHOL DIET/ EXERCISE.  MONITOR. F/U LABS  GOALS D/W PT.  MAKE CHANGES AS NEEDED. 3. Palpitations  COUNSELLED. ADVISED DECREASE CAFFEINE. MONITOR. PREVIOUS TSH - NL. MONITOR. MAKE CHANGES AS NEEDED. 4. Chronic bilateral low back pain without sciatica  COUNSELLED. ? OA. EXERCISES. ANALGESICS PRN. MONITOR. ADVISED HOME EXERCISES, WT LOSS  F/U ORTHO  MAKE CHANGES AS NEEDED. 5. Arthritis of knee  COUNSELLED. EXERCISES. ANALGESICS PRN. MONITOR. ADVISED HOME EXERCISES, ADVISED WT LOSS  F/U ORTHO  MAKE CHANGES AS NEEDED. 6. Vitamin D deficiency  COUNSELLED. ADVISED MED COMPLIANCE - ADVISED VIT D 2000 U DAILY. MONITOR AND MAKE CHANGES AS NEEDED. 7. Severe obesity ([address] COUNSELLED. DIET/ EXERCISE ADVISED. LIFESTYLE MODIFICATION. WT LOSS ADVISED. LABS TO EVAL. R/O DM, R/O THYROID D/O. MONITOR AND MAKE CHANGES AS NEEDED. 8. Screening mammogram for breast cancer  COUNSELLED. KUSHAL DIGITAL SCREEN W OR WO CAD BILATERAL TO EVAL. MONITOR  MAKE CHANGES AS NEEDED. 9. BP check  COUNSELLED. REPEAT BP AS ABOVE  ADVISED ON DIET / EXERCISE/ WT LOSS  MONITOR. MAKE CHANGES AS NEEDED.                          MEDICATION SIDE EFFECTS D/W PATIENT    RETURN TO CLINIC WITHIN 4 MONTHS / PRN    FOLLOW UP FOR FASTING LABS, MAMMOGRAM

## 2023-01-19 DIAGNOSIS — R00.2 PALPITATIONS: ICD-10-CM

## 2023-01-19 DIAGNOSIS — E78.49 OTHER HYPERLIPIDEMIA: ICD-10-CM

## 2023-01-19 DIAGNOSIS — E55.9 VITAMIN D DEFICIENCY: ICD-10-CM

## 2023-01-19 DIAGNOSIS — Z00.00 PHYSICAL EXAM: ICD-10-CM

## 2023-01-19 LAB
A/G RATIO: 1.9 (ref 1.1–2.2)
ALBUMIN SERPL-MCNC: 4.2 G/DL (ref 3.4–5)
ALP BLD-CCNC: 42 U/L (ref 40–129)
ALT SERPL-CCNC: 18 U/L (ref 10–40)
ANION GAP SERPL CALCULATED.3IONS-SCNC: 11 MMOL/L (ref 3–16)
AST SERPL-CCNC: 22 U/L (ref 15–37)
BASOPHILS ABSOLUTE: 0 K/UL (ref 0–0.2)
BASOPHILS RELATIVE PERCENT: 0.5 %
BILIRUB SERPL-MCNC: 0.5 MG/DL (ref 0–1)
BUN BLDV-MCNC: 11 MG/DL (ref 7–20)
CALCIUM SERPL-MCNC: 9.7 MG/DL (ref 8.3–10.6)
CHLORIDE BLD-SCNC: 103 MMOL/L (ref 99–110)
CHOLESTEROL, TOTAL: 199 MG/DL (ref 0–199)
CO2: 26 MMOL/L (ref 21–32)
CREAT SERPL-MCNC: 0.6 MG/DL (ref 0.6–1.1)
EOSINOPHILS ABSOLUTE: 0 K/UL (ref 0–0.6)
EOSINOPHILS RELATIVE PERCENT: 1 %
ESTIMATED AVERAGE GLUCOSE: 105.4 MG/DL
GFR SERPL CREATININE-BSD FRML MDRD: >60 ML/MIN/{1.73_M2}
GLUCOSE BLD-MCNC: 85 MG/DL (ref 70–99)
HBA1C MFR BLD: 5.3 %
HCT VFR BLD CALC: 38.4 % (ref 36–48)
HDLC SERPL-MCNC: 67 MG/DL (ref 40–60)
HEMOGLOBIN: 12.5 G/DL (ref 12–16)
HEPATITIS C ANTIBODY INTERPRETATION: NORMAL
LDL CHOLESTEROL CALCULATED: 119 MG/DL
LYMPHOCYTES ABSOLUTE: 1.8 K/UL (ref 1–5.1)
LYMPHOCYTES RELATIVE PERCENT: 47.7 %
MCH RBC QN AUTO: 29.5 PG (ref 26–34)
MCHC RBC AUTO-ENTMCNC: 32.4 G/DL (ref 31–36)
MCV RBC AUTO: 90.9 FL (ref 80–100)
MONOCYTES ABSOLUTE: 0.4 K/UL (ref 0–1.3)
MONOCYTES RELATIVE PERCENT: 9.8 %
NEUTROPHILS ABSOLUTE: 1.5 K/UL (ref 1.7–7.7)
NEUTROPHILS RELATIVE PERCENT: 41 %
PDW BLD-RTO: 13.9 % (ref 12.4–15.4)
PLATELET # BLD: 294 K/UL (ref 135–450)
PMV BLD AUTO: 7.6 FL (ref 5–10.5)
POTASSIUM SERPL-SCNC: 4.5 MMOL/L (ref 3.5–5.1)
RBC # BLD: 4.23 M/UL (ref 4–5.2)
SODIUM BLD-SCNC: 140 MMOL/L (ref 136–145)
TOTAL PROTEIN: 6.4 G/DL (ref 6.4–8.2)
TRIGL SERPL-MCNC: 66 MG/DL (ref 0–150)
TSH REFLEX: 1.33 UIU/ML (ref 0.27–4.2)
VITAMIN D 25-HYDROXY: 28.9 NG/ML
VLDLC SERPL CALC-MCNC: 13 MG/DL
WBC # BLD: 3.8 K/UL (ref 4–11)

## 2023-01-20 LAB
HIV AG/AB: NORMAL
HIV ANTIGEN: NORMAL
HIV-1 ANTIBODY: NORMAL
HIV-2 AB: NORMAL

## 2023-02-06 ENCOUNTER — TELEMEDICINE (OUTPATIENT)
Dept: INTERNAL MEDICINE CLINIC | Age: 58
End: 2023-02-06
Payer: COMMERCIAL

## 2023-02-06 DIAGNOSIS — E78.49 OTHER HYPERLIPIDEMIA: ICD-10-CM

## 2023-02-06 DIAGNOSIS — J01.00 ACUTE MAXILLARY SINUSITIS, RECURRENCE NOT SPECIFIED: Primary | ICD-10-CM

## 2023-02-06 DIAGNOSIS — D70.8 OTHER NEUTROPENIA (HCC): ICD-10-CM

## 2023-02-06 DIAGNOSIS — M54.50 CHRONIC BILATERAL LOW BACK PAIN WITHOUT SCIATICA: ICD-10-CM

## 2023-02-06 DIAGNOSIS — E55.9 VITAMIN D DEFICIENCY: ICD-10-CM

## 2023-02-06 DIAGNOSIS — G89.29 CHRONIC BILATERAL LOW BACK PAIN WITHOUT SCIATICA: ICD-10-CM

## 2023-02-06 PROCEDURE — 99214 OFFICE O/P EST MOD 30 MIN: CPT | Performed by: INTERNAL MEDICINE

## 2023-02-06 RX ORDER — FEXOFENADINE HCL 180 MG/1
180 TABLET ORAL DAILY PRN
Qty: 30 TABLET | Refills: 0 | Status: SHIPPED | OUTPATIENT
Start: 2023-02-06 | End: 2023-03-08

## 2023-02-06 RX ORDER — FLUTICASONE PROPIONATE 50 MCG
2 SPRAY, SUSPENSION (ML) NASAL DAILY PRN
Qty: 1 EACH | Refills: 0 | Status: SHIPPED | OUTPATIENT
Start: 2023-02-06

## 2023-02-06 RX ORDER — AMOXICILLIN 500 MG/1
500 CAPSULE ORAL 3 TIMES DAILY
Qty: 30 CAPSULE | Refills: 0 | Status: SHIPPED | OUTPATIENT
Start: 2023-02-06 | End: 2023-02-16

## 2023-02-06 SDOH — ECONOMIC STABILITY: INCOME INSECURITY: HOW HARD IS IT FOR YOU TO PAY FOR THE VERY BASICS LIKE FOOD, HOUSING, MEDICAL CARE, AND HEATING?: NOT HARD AT ALL

## 2023-02-06 SDOH — ECONOMIC STABILITY: HOUSING INSECURITY
IN THE LAST 12 MONTHS, WAS THERE A TIME WHEN YOU DID NOT HAVE A STEADY PLACE TO SLEEP OR SLEPT IN A SHELTER (INCLUDING NOW)?: NO

## 2023-02-06 SDOH — ECONOMIC STABILITY: FOOD INSECURITY: WITHIN THE PAST 12 MONTHS, YOU WORRIED THAT YOUR FOOD WOULD RUN OUT BEFORE YOU GOT MONEY TO BUY MORE.: NEVER TRUE

## 2023-02-06 SDOH — ECONOMIC STABILITY: FOOD INSECURITY: WITHIN THE PAST 12 MONTHS, THE FOOD YOU BOUGHT JUST DIDN'T LAST AND YOU DIDN'T HAVE MONEY TO GET MORE.: NEVER TRUE

## 2023-02-06 NOTE — PROGRESS NOTES
2023    TELEHEALTH EVALUATION -- Audio/Visual (During FTBML-59 public health emergency)    PLACE OF SERVICE: PATIENT'S HOME    HPI: SEE BELOW    Akira Champagne (:  1965) has requested an audio/video evaluation for the following concern(s):    C/O NASAL CONGESTION - PAST 2+ WEEKS. + POSTNASAL DRAINAGE, + SINUS PRESSURE, + OCC HA, NO SNEEZING, NO WATERY ITCHY EYES, NO F/C. NO BODY ACHES, NO SORE THROAT. NO KNOWN SICK CONTACTS. STATES OTC CLARITIN NOT HELPING    HLP - NOT ON MED. ? EXERCISE / DIET COMPLIANCE . LAB D/W PT   LOW BACK PAIN - INTERMITTENT,   SHARP PAIN. STATES RADHA, ? RAD . PAIN SCALE 1/10. NO NUMBNESS / NO TINGLING. FOLLOWING WITH ORTHO  VIT D DEF -  ? MED COMPLIANCE. NOT AT GOAL -  LABS D/W PT  LEUCOPENIA - LAB D/W PT     DENIES CP,  No SOB, NO PALPITATIONS, OCC COUGH, NO F/C  No ABD PAIN, No N/V, No DIARRHEA, No CONSTIPATION, No MELENA, No HEMATOCHEZIA. No DYSURIA, No FREQ, No URGENCY, No HEMATURIA       Allergies   Allergen Reactions    Sulfa Antibiotics Hives         Prior to Visit Medications    Medication Sig Taking? Authorizing Provider   Cholecalciferol (VITAMIN D3) 50 MCG (2000 UT) CAPS Take 1 capsule by mouth daily Yes Abby Nelson MD   naproxen (NAPROSYN) 500 MG tablet Take 1 tablet by mouth 2 times daily (with meals) Yes VAZQUEZ [place]   senna (SENOKOT) 8.6 MG tablet Take 1 tablet by mouth daily Yes Historical Provider, MD   NONFORMULARY BIOMED COMPOUND FOR KNEE Yes Historical Provider, MD   Multiple Vitamins-Minerals (THERAPEUTIC MULTIVITAMIN-MINERALS) tablet Take 1 tablet by mouth daily Yes Historical Provider, MD   Chromium Picolinate 200 MCG CAPS Take by mouth 2 times daily  Yes Historical Provider, MD   gabapentin (NEURONTIN) 300 MG capsule Take 1 capsule by mouth nightly for 30 days. Intended supply: 30 days  VAZQUEZ [place]       Social History     Tobacco Use    Smoking status: Never    Smokeless tobacco: Never   Substance Use Topics    Alcohol use:  Yes Comment: Rare    Drug use: No        ROS: COMPREHENSIVE ROS AS IN HX, REST -VE  History obtained from chart review and the patient    PHYSICAL EXAMINATION:  [ INSTRUCTIONS:  \"[x]\" Indicates a positive item  \"[]\" Indicates a negative item  -- DELETE ALL ITEMS NOT EXAMINED]  Vital Signs: (As obtained by patient/caregiver or practitioner observation)    Blood pressure-  Heart rate-    Respiratory rate-    Temperature-  Pulse oximetry-   PT UNABLE TO CHECK BP / VITALS AT TIME OF VISIT    Constitutional: [x] Appears well-developed and well-nourished [x] No apparent distress      [] Abnormal-   Mental status   A[]lert and awake  [x] Oriented to person/place/time [x]Able to follow commands      Eyes:  EOM    [x]  Normal  [] Abnormal-  Sclera  [x]  Normal  [] Abnormal -         Discharge [x]  None visible  [] Abnormal -    HENT:   [x] Normocephalic, atraumatic. [x] Abnormal  SEE  BELOW  [x] Mouth/Throat: Mucous membranes are moist.     External Ears [x] Normal  [] Abnormal-     Neck: [x] No visualized mass     Pulmonary/Chest: [x] Respiratory effort normal.  [x] No visualized signs of difficulty breathing or respiratory distress        [] Abnormal-      Musculoskeletal:   [] Normal gait with no signs of ataxia         [x] Normal range of motion of neck        [] Abnormal-       Neurological:        [x] No Facial Asymmetry (Cranial nerve 7 motor function) (limited exam to video visit)          [x] No gaze palsy        [] Abnormal-         Skin:        [x] No significant exanthematous lesions or discoloration noted on facial skin         [] Abnormal-            Psychiatric:       [x] Normal Affect [x] No Hallucinations        [] Abnormal-     Other pertinent observable physical exam findings-  + NASAL CONGESTION, + RADHA MAXILLARY SINUS TENDERNESS    PREVIOUS LABS REVIEWED AND D/W PT     ASSESSMENT/PLAN:   Diagnosis Orders   1. Acute maxillary sinusitis, recurrence not specified  COUNSELLED.  SYMPTOMATIC RX  ADVISED CHANGE TO ALLEGRA 180 MG DAILY/PRN. FLONASE PRN  AMOXIL 500 MG TID X 10 DAYS  MONITOR. MAKE CHANGES AS NEEDED. 2. Other hyperlipidemia  COUNSELLED. ADVISED LOW FAT / CHOL DIET/ EXERCISE.  MONITOR. GOALS D/W PT.  MAKE CHANGES AS NEEDED. 3. Chronic bilateral low back pain without sciatica  COUNSELLED. EXERCISES. ANALGESICS PRN. MONITOR. HOME EXERCISES  MAKE CHANGES AS NEEDED. 4. Vitamin D deficiency  COUNSELLED. ADVISED MED COMPLIANCE - ADVISED VIT D 2000 U DAILY. MONITOR AND MAKE CHANGES AS NEEDED. 5. Other neutropenia ([address]  COUNSELLED. MILD. MONITOR. READDRESS REPEAT LAB  MAKE CHANGES AS NEEDED. MEDICATION SIDE EFFECTS D/W PATIENT     RETURN TO CLINIC PREVIOUS / PRN      [place], was evaluated through a synchronous (real-time) audio-video encounter. The patient (or guardian if applicable) is aware that this is a billable service, which includes applicable co-pays. This Virtual Visit was conducted with patient's (and/or legal guardian's) consent. The visit was conducted pursuant to the emergency declaration under the [address] [place] [address], [address] [place] [address] authority and the [employer] and [employer] General Act. Patient identification was verified, and a caregiver was present when appropriate. The patient was located at Home: [place] [id number]  Provider was located at [place] [address] (Appt Dept): [phone]          --Gerald Niño MD on 2/6/2023 at 7:58 PM    An electronic signature was used to authenticate this note.

## 2023-05-08 ENCOUNTER — OFFICE VISIT (OUTPATIENT)
Dept: ORTHOPEDIC SURGERY | Age: 58
End: 2023-05-08

## 2023-05-08 DIAGNOSIS — M25.511 RIGHT SHOULDER PAIN, UNSPECIFIED CHRONICITY: Primary | ICD-10-CM

## 2023-05-08 DIAGNOSIS — M25.562 LEFT KNEE PAIN, UNSPECIFIED CHRONICITY: ICD-10-CM

## 2023-05-08 DIAGNOSIS — M25.561 RIGHT KNEE PAIN, UNSPECIFIED CHRONICITY: ICD-10-CM

## 2023-05-08 RX ORDER — MELOXICAM 15 MG/1
15 TABLET ORAL DAILY PRN
Qty: 30 TABLET | Refills: 3 | Status: SHIPPED | OUTPATIENT
Start: 2023-05-08

## 2023-05-08 NOTE — PROGRESS NOTES
instability. No posterior instability. Special Tests: Impingement findings are negative. Labral findings are negative. Speed sign and Yergason signs are both negative. Crossover sign is negative. Belly press sign is negative. Lift off sign is negative. Other findings: The skin is warm dry and well perfused. 2+ radial pulse. Sensation is intact to light touch over the deltoid. Left comparison shoulder exam    Inspection:  Held in a normal posture. Normal contour at the acromioclavicular joint. No swelling, ecchymosis, or erythema about the shoulder. No atrophy appreciated. No scapular winging. Palpation:  No subacromial crepitus. No tenderness of the AC joint. No greater tuberosity tenderness. No tenderness in the bicipital groove. Range of Motion: Full passive and active ROM. Normal scapulothoracic rhythm. Strength:  Normal supraspinatus, infraspinatus, and subscapularis muscle strength. Stability: No anterior instability. No posterior instability. Special Tests: Impingement findings are negative. Labral findings are negative. Speed sign and Yergason signs are both negative. Crossover sign is negative. Belly press sign is negative. Lift off sign is negative. Other findings: The skin is warm dry and well perfused. 2+ radial pulse. Sensation is intact to light touch over the deltoid. Right knee exam    Gait: No use of assistive devices. No antalgic gait. Alignment: normal alignment. Inspection/skin: Skin is intact without erythema or ecchymosis. No gross deformity. Palpation: mild crepitus. Mild pain to palpation medial joint line    Range of Motion: There is full range of motion. Strength: Normal quadriceps development. Effusion: No effusion or swelling present. Ligamentous stability: No cruciate or collateral ligament instability. Neurologic and vascular: Skin is warm and well-perfused. Sensation is intact to light-touch.      Special tests: Negative

## 2024-03-05 ENCOUNTER — OFFICE VISIT (OUTPATIENT)
Dept: INTERNAL MEDICINE CLINIC | Age: 59
End: 2024-03-05
Payer: COMMERCIAL

## 2024-03-05 VITALS
DIASTOLIC BLOOD PRESSURE: 88 MMHG | BODY MASS INDEX: 38.73 KG/M2 | TEMPERATURE: 98.4 F | SYSTOLIC BLOOD PRESSURE: 130 MMHG | OXYGEN SATURATION: 97 % | WEIGHT: 205 LBS | HEART RATE: 60 BPM

## 2024-03-05 DIAGNOSIS — D70.8 OTHER NEUTROPENIA (HCC): ICD-10-CM

## 2024-03-05 DIAGNOSIS — H66.91 ACUTE OTITIS MEDIA, RIGHT: Primary | ICD-10-CM

## 2024-03-05 DIAGNOSIS — J32.8 OTHER CHRONIC SINUSITIS: ICD-10-CM

## 2024-03-05 DIAGNOSIS — L28.2 PRURITIC RASH: ICD-10-CM

## 2024-03-05 DIAGNOSIS — E55.9 VITAMIN D DEFICIENCY: ICD-10-CM

## 2024-03-05 DIAGNOSIS — E78.49 OTHER HYPERLIPIDEMIA: ICD-10-CM

## 2024-03-05 PROCEDURE — 99214 OFFICE O/P EST MOD 30 MIN: CPT | Performed by: INTERNAL MEDICINE

## 2024-03-05 RX ORDER — AMOXICILLIN 500 MG/1
500 CAPSULE ORAL 3 TIMES DAILY
Qty: 21 CAPSULE | Refills: 0 | Status: SHIPPED | OUTPATIENT
Start: 2024-03-05 | End: 2024-03-12

## 2024-03-05 SDOH — ECONOMIC STABILITY: INCOME INSECURITY: HOW HARD IS IT FOR YOU TO PAY FOR THE VERY BASICS LIKE FOOD, HOUSING, MEDICAL CARE, AND HEATING?: NOT HARD AT ALL

## 2024-03-05 SDOH — ECONOMIC STABILITY: FOOD INSECURITY: WITHIN THE PAST 12 MONTHS, YOU WORRIED THAT YOUR FOOD WOULD RUN OUT BEFORE YOU GOT MONEY TO BUY MORE.: NEVER TRUE

## 2024-03-05 SDOH — ECONOMIC STABILITY: FOOD INSECURITY: WITHIN THE PAST 12 MONTHS, THE FOOD YOU BOUGHT JUST DIDN'T LAST AND YOU DIDN'T HAVE MONEY TO GET MORE.: NEVER TRUE

## 2024-03-05 ASSESSMENT — PATIENT HEALTH QUESTIONNAIRE - PHQ9
SUM OF ALL RESPONSES TO PHQ QUESTIONS 1-9: 0
1. LITTLE INTEREST OR PLEASURE IN DOING THINGS: 0
SUM OF ALL RESPONSES TO PHQ QUESTIONS 1-9: 0
2. FEELING DOWN, DEPRESSED OR HOPELESS: 0
SUM OF ALL RESPONSES TO PHQ9 QUESTIONS 1 & 2: 0
SUM OF ALL RESPONSES TO PHQ QUESTIONS 1-9: 0
SUM OF ALL RESPONSES TO PHQ QUESTIONS 1-9: 0

## 2024-03-05 NOTE — PROGRESS NOTES
SYMPTOMATIC RX. MED PRN  MONITOR. MAKE CHANGES AS NEEDED.                         MEDICATION SIDE EFFECTS D/W PATIENT    RETURN TO CLINIC WITHIN 4 MONTHS / PRN    FOLLOW UP FOR FASTING LABS

## 2024-03-05 NOTE — PATIENT INSTRUCTIONS
TAKE MED AS ADVISED    DIET/ EXERCISE.    FOLLOW UP WITHIN 4 MONTHS / AS NEEDED    FOLLOW UP FOR FASTING LABS      [facility] Locations - No appointment necessary.  ? indicates the location is open Saturdays in addition to Monday through Friday.   Call your preferred location for test preparation, business hours and other information you need.   [facility] accepts all insurances.  CENTRAL  EAST  [place]    ? [place]   [address]   [address]   [place] [address]    Ph: [phone]  [facility]   [address] [place] [address]    Ph: [phone]   ? [place]   [address]    [place] [address]    Ph: [phone]     [facility]   [address] [place] [address]    Ph: [phone] ? [place]   [address] [place] [address]   Ph: [phone]  ? [facility]   [address] [place] [address]    Ph: [phone]      [place]   [address] [place] [address]   Ph: [phone]    NORTH    ? [facility]   [address] [place] [address]    Ph: [phone]  [facility]   [address] [place] [address]   Ph: [phone]  [place]   [address] [place], [place] [address]vd.   [facility], [address]    PH: [phone]    [facility]   [address] [place] [address]    Ph: [phone]  [place]  [address] [place] [address]  Ph: [phone]  [facility]   [address] [place] [address]    Ph: [phone]

## 2024-03-25 ENCOUNTER — OFFICE VISIT (OUTPATIENT)
Dept: ORTHOPEDIC SURGERY | Age: 59
End: 2024-03-25
Payer: COMMERCIAL

## 2024-03-25 DIAGNOSIS — M54.50 LOW BACK PAIN, UNSPECIFIED BACK PAIN LATERALITY, UNSPECIFIED CHRONICITY, UNSPECIFIED WHETHER SCIATICA PRESENT: ICD-10-CM

## 2024-03-25 DIAGNOSIS — M25.551 RIGHT HIP PAIN: ICD-10-CM

## 2024-03-25 DIAGNOSIS — M70.61 TROCHANTERIC BURSITIS, RIGHT HIP: Primary | ICD-10-CM

## 2024-03-25 PROCEDURE — 99204 OFFICE O/P NEW MOD 45 MIN: CPT | Performed by: ORTHOPAEDIC SURGERY

## 2024-03-25 RX ORDER — PREDNISONE 5 MG/1
5 TABLET ORAL DAILY
Qty: 26 TABLET | Refills: 0 | Status: SHIPPED | OUTPATIENT
Start: 2024-03-25

## 2024-03-25 NOTE — PROGRESS NOTES
plan.       Giorgio Ruelas MD  Orthopedic Surgery Sports Medicine Fellow  03/25/24  6:03 PM    I spent 46 minutes on patient education and coordinating care today. This included time examining the patient, reviewing the patient's chart and relevant imaging, and chart documentation. This excluded any separately billed procedures.    This dictation was performed with a verbal recognition program (DRAGON) and it was checked for errors.  It is possible that there are still dictated errors within this office note.  If so, please bring any errors to my attention for an addendum.  All efforts were made to ensure that this office note is accurate.       I attest that I met personally with the patient, performed the described exam, reviewed the radiographic studies and medical records associated with this patient and supervised the services that are described above.     Bubba Fernandez MD

## 2024-03-27 ENCOUNTER — OFFICE VISIT (OUTPATIENT)
Dept: ORTHOPEDIC SURGERY | Age: 59
End: 2024-03-27
Payer: COMMERCIAL

## 2024-03-27 VITALS — HEIGHT: 60 IN | BODY MASS INDEX: 40.25 KG/M2 | WEIGHT: 205 LBS

## 2024-03-27 DIAGNOSIS — M48.061 SPINAL STENOSIS OF LUMBAR REGION, UNSPECIFIED WHETHER NEUROGENIC CLAUDICATION PRESENT: ICD-10-CM

## 2024-03-27 DIAGNOSIS — M43.16 SPONDYLOLISTHESIS OF LUMBAR REGION: Primary | ICD-10-CM

## 2024-03-27 DIAGNOSIS — M51.36 DDD (DEGENERATIVE DISC DISEASE), LUMBAR: ICD-10-CM

## 2024-03-27 PROCEDURE — 99214 OFFICE O/P EST MOD 30 MIN: CPT | Performed by: PHYSICIAN ASSISTANT

## 2024-03-27 NOTE — PROGRESS NOTES
New Patient: SPINE    3/27/2024     CHIEF COMPLAINT:    Chief Complaint   Patient presents with    New Patient     LUMBAR/REF BY DR. FERNANDEZ       HISTORY OF PRESENT ILLNESS:              The patient is a 58 y.o. female history glaucoma referred by Dr. Bubba Fernandez MD for low back pain.  She reports a history of chronic worsening aching low back pain extending into the right buttock, lateral thigh.  Her symptoms have been increasing over the last few weeks.  She had a recent hip evaluation with Dr. Fernandez and was diagnosed with right greater trochanteric bursitis.  Her back pain is increased with any prolonged inactivity such as sitting.  She reports relief with standing.  Conservative care includes remote PHILIP with benefit, physical therapy, oral steroids, diclofenac, prior gabapentin.  She had a prior surgical consultation with Dr. Sainz who recommended continuing conservative care at that time.  She currently denies any contralateral radiating pain.  She denies any progressive numbness tingling or progressive extremity weakness.  She denies any recent bowel or bladder dysfunction or saddle anesthesia.  Denies any recent fevers chills or infections.    The pain assessment was noted & reviewed in the medical record today.     Current/Past Treatment:   Physical Therapy: Yes  Chiropractic:     Injection:     9/15/2021 Right L4/5 transforaminal epidural injection - Dr. Weir ([facility]) with benefit  Medications:            NSAIDS:             Muscle relaxer:              Steriods: Prednisone            Neuropathic medications: Prior gabapentin            Opioids:            Other:   Surgery/Consult: Surgical consult with Dr. 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

## 2024-04-18 ENCOUNTER — HOSPITAL ENCOUNTER (OUTPATIENT)
Dept: PHYSICAL THERAPY | Age: 59
Setting detail: THERAPIES SERIES
Discharge: HOME | End: 2024-04-18
Payer: COMMERCIAL

## 2024-04-18 DIAGNOSIS — D70.8 OTHER NEUTROPENIA (HCC): ICD-10-CM

## 2024-04-18 DIAGNOSIS — E78.49 OTHER HYPERLIPIDEMIA: ICD-10-CM

## 2024-04-18 DIAGNOSIS — E55.9 VITAMIN D DEFICIENCY: ICD-10-CM

## 2024-04-18 DIAGNOSIS — M53.86 DECREASED ROM OF INTERVERTEBRAL DISCS OF LUMBAR SPINE: ICD-10-CM

## 2024-04-18 DIAGNOSIS — R52 INCREASED PAIN: Primary | ICD-10-CM

## 2024-04-18 DIAGNOSIS — R52 PAIN AGGRAVATED BY WALKING: ICD-10-CM

## 2024-04-18 DIAGNOSIS — R53.1 DECREASED STRENGTH: ICD-10-CM

## 2024-04-18 LAB
BASOPHILS # BLD: 0 K/UL (ref 0–0.2)
BASOPHILS NFR BLD: 0.6 %
DEPRECATED RDW RBC AUTO: 14.2 % (ref 12.4–15.4)
EOSINOPHIL # BLD: 0.1 K/UL (ref 0–0.6)
EOSINOPHIL NFR BLD: 1.1 %
HCT VFR BLD AUTO: 38.2 % (ref 36–48)
HGB BLD-MCNC: 12.9 G/DL (ref 12–16)
LYMPHOCYTES # BLD: 2 K/UL (ref 1–5.1)
LYMPHOCYTES NFR BLD: 41.3 %
MCH RBC QN AUTO: 30.7 PG (ref 26–34)
MCHC RBC AUTO-ENTMCNC: 33.8 G/DL (ref 31–36)
MCV RBC AUTO: 90.9 FL (ref 80–100)
MONOCYTES # BLD: 0.5 K/UL (ref 0–1.3)
MONOCYTES NFR BLD: 9.6 %
NEUTROPHILS # BLD: 2.3 K/UL (ref 1.7–7.7)
NEUTROPHILS NFR BLD: 47.4 %
PLATELET # BLD AUTO: 273 K/UL (ref 135–450)
PMV BLD AUTO: 7.6 FL (ref 5–10.5)
RBC # BLD AUTO: 4.2 M/UL (ref 4–5.2)
WBC # BLD AUTO: 4.9 K/UL (ref 4–11)

## 2024-04-18 PROCEDURE — 97161 PT EVAL LOW COMPLEX 20 MIN: CPT | Performed by: PHYSICAL THERAPIST

## 2024-04-18 PROCEDURE — 97110 THERAPEUTIC EXERCISES: CPT | Performed by: PHYSICAL THERAPIST

## 2024-04-18 NOTE — THERAPY EVALUATION
expected and requires additional follow up with physician  [] Other:     TREATMENT PLAN     Frequency/Duration: 1-2x/week for 6 weeks for the following treatment interventions:    Interventions:  Therapeutic Exercise (01378) including: strength training, ROM, and functional mobility  Therapeutic Activities (49303) including: functional mobility training and education.  Neuromuscular Re-education (06123) activation and proprioception, including postural re-education.    Manual Therapy (75621) as indicated to include: Passive Range of Motion, Soft Tissue Mobilization, and Dry Needling/IASTM  Modalities as needed that may include: Cryotherapy, Electrical Stimulation, and Thermal Agents  Patient education on joint protection, postural re-education, activity modification, and progression of HEP    Plan: POC initiated as per evaluation    Electronically Signed by Natalie Magallon PT  Date: 04/18/2024     Note: Portions of this note have been templated and/or copied from initial evaluation, reassessments and prior notes for documentation efficiency.    Note: If patient does not return for scheduled/recommended follow up visits, this note will serve as a discharge from care along with the most recent update on progress.

## 2024-04-19 LAB
25(OH)D3 SERPL-MCNC: 29.5 NG/ML
ALBUMIN SERPL-MCNC: 4.4 G/DL (ref 3.4–5)
ALBUMIN/GLOB SERPL: 2 {RATIO} (ref 1.1–2.2)
ALP SERPL-CCNC: 46 U/L (ref 40–129)
ALT SERPL-CCNC: 18 U/L (ref 10–40)
ANION GAP SERPL CALCULATED.3IONS-SCNC: 10 MMOL/L (ref 3–16)
AST SERPL-CCNC: 21 U/L (ref 15–37)
BILIRUB SERPL-MCNC: 0.5 MG/DL (ref 0–1)
BUN SERPL-MCNC: 11 MG/DL (ref 7–20)
CALCIUM SERPL-MCNC: 9.4 MG/DL (ref 8.3–10.6)
CHLORIDE SERPL-SCNC: 106 MMOL/L (ref 99–110)
CHOLEST SERPL-MCNC: 229 MG/DL (ref 0–199)
CO2 SERPL-SCNC: 25 MMOL/L (ref 21–32)
CREAT SERPL-MCNC: 0.7 MG/DL (ref 0.6–1.1)
EST. AVERAGE GLUCOSE BLD GHB EST-MCNC: 108.3 MG/DL
GFR SERPLBLD CREATININE-BSD FMLA CKD-EPI: >90 ML/MIN/{1.73_M2}
GLUCOSE SERPL-MCNC: 79 MG/DL (ref 70–99)
HBA1C MFR BLD: 5.4 %
HDLC SERPL-MCNC: 75 MG/DL (ref 40–60)
LDLC SERPL CALC-MCNC: 140 MG/DL
POTASSIUM SERPL-SCNC: 4.5 MMOL/L (ref 3.5–5.1)
PROT SERPL-MCNC: 6.6 G/DL (ref 6.4–8.2)
SODIUM SERPL-SCNC: 141 MMOL/L (ref 136–145)
TRIGL SERPL-MCNC: 68 MG/DL (ref 0–150)
TSH SERPL DL<=0.005 MIU/L-ACNC: 0.85 UIU/ML (ref 0.27–4.2)
VLDLC SERPL CALC-MCNC: 14 MG/DL

## 2024-04-23 ENCOUNTER — HOSPITAL ENCOUNTER (OUTPATIENT)
Dept: PHYSICAL THERAPY | Age: 59
Setting detail: THERAPIES SERIES
Discharge: HOME | End: 2024-04-23
Payer: COMMERCIAL

## 2024-04-23 PROCEDURE — 97112 NEUROMUSCULAR REEDUCATION: CPT | Performed by: PHYSICAL THERAPIST

## 2024-04-23 PROCEDURE — 97110 THERAPEUTIC EXERCISES: CPT | Performed by: PHYSICAL THERAPIST

## 2024-04-23 NOTE — FLOWSHEET NOTE
[facility] [address], [place] [address] office: [phone] fax: [phone]      Physical Therapy: TREATMENT/PROGRESS NOTE   Patient: Modesta Rios (58 y.o. female)   Examination Date: 2024   :  1965 MRN: [id number]   Visit #: 2   Insurance Allowable Auth Needed   [place] []Yes    []No    Insurance: Payor: BCBS / Plan: BCBS - [place] PPO / Product Type: *No Product type* /   Insurance ID: [id number] - ([facility])  Secondary Insurance (if applicable):    Treatment Diagnosis:    No diagnosis found.     Medical Diagnosis: right hip bursistis  Spondylolisthesis of lumbar region [M43.16]  Spinal stenosis of lumbar region, unspecified whether neurogenic claudication present [M48.061]  DDD (degenerative disc disease), lumbar [M51.36]   Referring Physician: Sue Mary,*  PCP: Pamela Miguel MD     Plan of care signed (Y/N):     Date of Patient follow up with Physician:      POC update due: (10 visits /OR AUTH LIMITS, whichever is less)   2024                                             Precautions/ Contra-indications:           Latex allergy:  NO  Pacemaker:    NO  Contraindications for Manipulation: None  Date of Surgery: NA  Other:    Red Flags:  None    C-SSRS Triggered by Intake questionnaire:   [x] No, Questionnaire did not trigger screening.   [] Yes, Patient intake triggered further evaluation      [] C-SSRS Screening completed  [] PCP notified via Plan of Care  [] Emergency services notified     Preferred Language for Healthcare:   [x] English       [] other:    SUBJECTIVE EXAMINATION     Patient stated complaint: Patient feels some difference with stretching.         Test used Initial score  2024   Pain Summary VAS 10    Functional questionnaire Modified Oswestry 30%    Other:              Pain:  Pain location: low back and right hip  Patient describes pain to be intermittent and aching  Pain decreases

## 2024-04-29 ENCOUNTER — APPOINTMENT (OUTPATIENT)
Dept: PHYSICAL THERAPY | Age: 59
End: 2024-04-29
Payer: COMMERCIAL

## 2024-07-15 ENCOUNTER — OFFICE VISIT (OUTPATIENT)
Dept: ORTHOPEDIC SURGERY | Age: 59
End: 2024-07-15
Payer: COMMERCIAL

## 2024-07-15 DIAGNOSIS — M25.562 LEFT KNEE PAIN, UNSPECIFIED CHRONICITY: Primary | ICD-10-CM

## 2024-07-15 DIAGNOSIS — M17.12 PRIMARY OSTEOARTHRITIS OF LEFT KNEE: ICD-10-CM

## 2024-07-15 PROCEDURE — 99214 OFFICE O/P EST MOD 30 MIN: CPT | Performed by: STUDENT IN AN ORGANIZED HEALTH CARE EDUCATION/TRAINING PROGRAM

## 2024-07-15 PROCEDURE — 20610 DRAIN/INJ JOINT/BURSA W/O US: CPT | Performed by: STUDENT IN AN ORGANIZED HEALTH CARE EDUCATION/TRAINING PROGRAM

## 2024-07-15 RX ORDER — METHYLPREDNISOLONE ACETATE 40 MG/ML
40 INJECTION, SUSPENSION INTRA-ARTICULAR; INTRALESIONAL; INTRAMUSCULAR; SOFT TISSUE ONCE
Status: COMPLETED | OUTPATIENT
Start: 2024-07-15 | End: 2024-07-15

## 2024-07-15 RX ORDER — MELOXICAM 15 MG/1
15 TABLET ORAL DAILY
Qty: 30 TABLET | Refills: 3 | Status: SHIPPED | OUTPATIENT
Start: 2024-07-15

## 2024-07-15 RX ADMIN — Medication 4 ML: at 12:49

## 2024-07-15 RX ADMIN — METHYLPREDNISOLONE ACETATE 40 MG: 40 INJECTION, SUSPENSION INTRA-ARTICULAR; INTRALESIONAL; INTRAMUSCULAR; SOFT TISSUE at 12:49

## 2024-07-15 NOTE — PROGRESS NOTES
and surgical interventions were all described to the patient and questions were answered.    We discussed diagnosis treatment options patient.  Specifically, she does have left knee arthritis.  Due to the fact that she is so painful today we did recommend a steroid injection to help get the symptoms under control.  We also prescribe her meloxicam and refer to physical therapy for strengthening and stretching.  She will return to clinic in 4 to 6 weeks for repeat clinical evaluation.    The indications and risks of steroid injection as well as treatment alternatives were discussed with the patient who consented to the procedure. Under sterile conditions and after informed consent was obtained the patient was given an injection into the left knee. 2 cc of 40 mg Depo-Medrol (methylprednisolone) and 4 ml of 1.0% Lidocaine were placed in the knee after it was prepped with chlorhexidine.  This resulted in good relief of symptoms.  There were no complications. The patient was advised to ice the knee this evening and to avoid vigorous activities for the next 2 days.  They were advised to call us if there was any erythema, enduration, swelling or increasing pain.       Modesta Rios is in agreement with this plan. All questions were answered to patient's satisfaction and was encouraged to call with any further questions.     Yusuf Bruno MD  [facility] Fellow    This dictation was performed with a verbal recognition program (DRAGON) and it was checked for errors.  It is possible that there are still dictated errors within this office note.  If so, please bring any areas to my attention for an addendum.  All efforts were made to ensure that this office note is accurate.      I attest that I met personally with the patient, performed the described exam, reviewed the radiographic studies and medical records associated with this patient and supervised the services that are described above.     Bubba Fernandez MD

## 2024-11-20 ENCOUNTER — TELEPHONE (OUTPATIENT)
Dept: INTERNAL MEDICINE CLINIC | Age: 59
End: 2024-11-20

## 2024-11-20 NOTE — TELEPHONE ENCOUNTER
Patient called in to get an order for a Xray of her left Ankle and foot. Patient would like this as soon as possible.

## 2024-11-21 NOTE — TELEPHONE ENCOUNTER
Spoke with pt stated that she will go to urgent care to get the foot checked and get an xray. Stated that the pain has been keeping her up at night and she will make a follow up appointment after her visit to urgent care

## 2025-01-08 ASSESSMENT — PATIENT HEALTH QUESTIONNAIRE - PHQ9
2. FEELING DOWN, DEPRESSED OR HOPELESS: NOT AT ALL
1. LITTLE INTEREST OR PLEASURE IN DOING THINGS: NOT AT ALL
SUM OF ALL RESPONSES TO PHQ QUESTIONS 1-9: 0
SUM OF ALL RESPONSES TO PHQ9 QUESTIONS 1 & 2: 0

## 2025-01-09 ASSESSMENT — PATIENT HEALTH QUESTIONNAIRE - PHQ9
SUM OF ALL RESPONSES TO PHQ9 QUESTIONS 1 & 2: 0
1. LITTLE INTEREST OR PLEASURE IN DOING THINGS: NOT AT ALL
2. FEELING DOWN, DEPRESSED OR HOPELESS: NOT AT ALL

## 2025-01-22 ENCOUNTER — OFFICE VISIT (OUTPATIENT)
Dept: INTERNAL MEDICINE CLINIC | Age: 60
End: 2025-01-22

## 2025-01-22 VITALS
OXYGEN SATURATION: 93 % | DIASTOLIC BLOOD PRESSURE: 80 MMHG | RESPIRATION RATE: 14 BRPM | WEIGHT: 200 LBS | HEART RATE: 71 BPM | TEMPERATURE: 98.1 F | BODY MASS INDEX: 37.76 KG/M2 | HEIGHT: 61 IN | SYSTOLIC BLOOD PRESSURE: 118 MMHG

## 2025-01-22 DIAGNOSIS — J32.8 OTHER CHRONIC SINUSITIS: ICD-10-CM

## 2025-01-22 DIAGNOSIS — E55.9 VITAMIN D DEFICIENCY: ICD-10-CM

## 2025-01-22 DIAGNOSIS — H02.403 PTOSIS OF BOTH EYELIDS: ICD-10-CM

## 2025-01-22 DIAGNOSIS — Z01.818 PREOP EXAMINATION: Primary | ICD-10-CM

## 2025-01-22 DIAGNOSIS — L03.113 CELLULITIS OF RIGHT HAND: ICD-10-CM

## 2025-01-22 DIAGNOSIS — E78.49 OTHER HYPERLIPIDEMIA: ICD-10-CM

## 2025-01-22 DIAGNOSIS — R82.90 ABNORMAL URINALYSIS: ICD-10-CM

## 2025-01-22 LAB
BILIRUBIN, POC: NORMAL
BLOOD URINE, POC: NORMAL
CLARITY, POC: CLEAR
COLOR, POC: YELLOW
GLUCOSE URINE, POC: NORMAL MG/DL
KETONES, POC: NORMAL MG/DL
LEUKOCYTE EST, POC: NORMAL
NITRITE, POC: NORMAL
PH, POC: 6
PROTEIN, POC: NORMAL MG/DL
SPECIFIC GRAVITY, POC: 1.02
UROBILINOGEN, POC: 0.2 MG/DL

## 2025-01-22 RX ORDER — CEPHALEXIN 500 MG/1
500 CAPSULE ORAL 4 TIMES DAILY
Qty: 24 CAPSULE | Refills: 0 | Status: SHIPPED | OUTPATIENT
Start: 2025-01-22 | End: 2025-01-29

## 2025-01-22 RX ORDER — ACETAMINOPHEN 160 MG
2000 TABLET,DISINTEGRATING ORAL DAILY
Qty: 30 CAPSULE | Refills: 3 | Status: SHIPPED | OUTPATIENT
Start: 2025-01-22

## 2025-01-22 SDOH — ECONOMIC STABILITY: FOOD INSECURITY: WITHIN THE PAST 12 MONTHS, YOU WORRIED THAT YOUR FOOD WOULD RUN OUT BEFORE YOU GOT MONEY TO BUY MORE.: NEVER TRUE

## 2025-01-22 SDOH — ECONOMIC STABILITY: FOOD INSECURITY: WITHIN THE PAST 12 MONTHS, THE FOOD YOU BOUGHT JUST DIDN'T LAST AND YOU DIDN'T HAVE MONEY TO GET MORE.: NEVER TRUE

## 2025-01-22 NOTE — PATIENT INSTRUCTIONS
TAKE MED AS ADVISED    DIET/ EXERCISE.    FOLLOW UP WITHIN 3 MONTHS / AS NEEDED    FOLLOW UP FOR LABS, X RAY      [facility] Laboratory Locations - No appointment necessary.  ? indicates the location is open Saturdays in addition to Monday through Friday.   Call your preferred location for test preparation, business hours and other information you need.   [facility] accepts all insurances.  CENTRAL  EAST  [place]    ? [place]   [address]   [address]   [place] [address]    Ph: [phone]  [facility]   [address] [place] [address]    Ph: [phone]   ? [place]   [address]    [place] [address]    Ph: [phone]     [facility]   [address] [place] [address]    Ph: [phone] ? [place]   [address] [place] [address]   Ph: [phone]  ? [facility]   [address] [place] [address]    Ph: [phone]      [place]   [address] [place] [address]   Ph: [phone]     [facility]  [address] [place] [address]   Ph: [phone]    [place]    ? [facility]   [address] [place] [address]    Ph: [phone]  [facility]   [address] [place] [address]   Ph: [phone]  [place]   [address]   [facility] [address]    PH: [phone]    [facility]   [address] [place] [address]    Ph: [phone]  [place]  [address] [place] [address]  Ph: [phone]  [facility]   [address] [place] [address]    Ph: [phone]

## 2025-01-22 NOTE — PROGRESS NOTES
Picolinate 200 MCG CAPS Take by mouth 2 times daily  Yes Provider, MD Fab   predniSONE (DELTASONE) 5 MG tablet Take 1 tablet by mouth daily 12 day steroid taper. Please take medication as follows: 15 mg (3 tablets) for 4 days, 10 mg (Two tablets) for 4 days, 5 mg (1 tablet) for 4 days  Giorgio Ruelas MD       ROS: COMPREHENSIVE ROS AS IN HX, REST -VE  History obtained from chart review and the patient       OBJECTIVE:   NURSING NOTE AND VITALS REVIEWED  /82   Pulse 71   Temp 98.1 °F (36.7 °C) (Oral)   Wt 90.7 kg (200 lb)   SpO2 93%   BMI 38.1 kg/m²     NO ACUTE DISTRESS    REPEAT BP: 118/80 (LT), NO ORTHOSTASIS     RESP: 14    Ht: 5' 0.75\"    Body mass index is 38.1 kg/m².     HEENT: NO PALLOR, ANICTERIC, PERRLA, EOMI, NO CONJUNCTIVAL ERYTHEMA,                 NO SINUS TENDERNESS, DROOPING EYE LIDS NOTED  NECK:  SUPPLE, TRACHEA MIDLINE, NT, NO JVD, NO CB, NO LA, NO TM, NO STIFFNESS  CHEST: RESPY EFFORT NL, GOOD AE, NO W/R/C  HEART: S1S2+ REG, NO M/G/R  ABD: OBESE, SOFT, NT, NO HSM, BS+  EXT: NO EDEMA, NT, PULSES +. JOSE MARTIN'S -VE  NEURO: ALERT AND ORIENTED X 3, NO MENINGEAL SIGNS, NO TREMORS, NL GAIT, NO FOCAL DEFICITS  PSYCH: FAIRLY GOOD AFFECT  BACK: NT, NO ROM, NO CVA TENDERNESS  HANDS: LOCALIZED SWELLING DORSAL RT HAND, + TENDERNESS SAME, + WARMTH,  ? ERYTHEMA  PREVIOUS LABS REVIEWED AND D/W PT    UA: TRACE KETONES, TRACE LEUKOCYTES    EKG: SINUS RHYTHM HR 65, NO ACUTE FINDINGS    ASSESSMENT / PLAN:     Diagnosis Orders   1. Preop examination  COUNSELLED.   PREOP EVAL PER REQUEST OF DR. DÍAZ   ADVISED CLOSE FRANCE / POST OP MONITORING.  F/U LABS  PT OTHERWISE OK TO PROCEED WITH PLANNED PROCEDURE .   SEE COMPLETED FORM IN CHART.  FORM  / LETTER FAXED TO SURGERY AND COPY GIVEN TO PT.  MAKE CHANGES AS NEEDED.        2. Ptosis of both eyelids  COUNSELLED. PREOP EXAM AS ABOVE  F/U OPHTHAL  MONITOR MAKE CHANGES AS NEEDED.       3. Cellulitis of right hand  COUNSELLED. ELEVATE PRN  START cephALEXin

## 2025-01-23 ENCOUNTER — HOSPITAL ENCOUNTER (OUTPATIENT)
Dept: GENERAL RADIOLOGY | Age: 60
Discharge: HOME | End: 2025-01-23
Payer: COMMERCIAL

## 2025-01-23 DIAGNOSIS — E78.49 OTHER HYPERLIPIDEMIA: ICD-10-CM

## 2025-01-23 DIAGNOSIS — E55.9 VITAMIN D DEFICIENCY: ICD-10-CM

## 2025-01-23 DIAGNOSIS — Z01.818 PREOP EXAMINATION: ICD-10-CM

## 2025-01-23 DIAGNOSIS — L03.113 CELLULITIS OF RIGHT HAND: ICD-10-CM

## 2025-01-23 DIAGNOSIS — R82.90 ABNORMAL URINALYSIS: ICD-10-CM

## 2025-01-23 DIAGNOSIS — H02.403 PTOSIS OF BOTH EYELIDS: ICD-10-CM

## 2025-01-23 PROCEDURE — 73120 X-RAY EXAM OF HAND: CPT

## 2025-01-24 LAB
25(OH)D3 SERPL-MCNC: 28.8 NG/ML
ALBUMIN SERPL-MCNC: 4.6 G/DL (ref 3.4–5)
ALBUMIN/GLOB SERPL: 1.9 {RATIO} (ref 1.1–2.2)
ALP SERPL-CCNC: 49 U/L (ref 40–129)
ALT SERPL-CCNC: 24 U/L (ref 10–40)
ANION GAP SERPL CALCULATED.3IONS-SCNC: 11 MMOL/L (ref 3–16)
AST SERPL-CCNC: 23 U/L (ref 15–37)
BACTERIA URNS QL MICRO: ABNORMAL /HPF
BASOPHILS # BLD: 0 K/UL (ref 0–0.2)
BASOPHILS NFR BLD: 0.5 %
BILIRUB SERPL-MCNC: 0.4 MG/DL (ref 0–1)
BILIRUB UR QL STRIP.AUTO: NEGATIVE
BUN SERPL-MCNC: 14 MG/DL (ref 7–20)
CALCIUM SERPL-MCNC: 9.7 MG/DL (ref 8.3–10.6)
CHLORIDE SERPL-SCNC: 105 MMOL/L (ref 99–110)
CLARITY UR: ABNORMAL
CO2 SERPL-SCNC: 25 MMOL/L (ref 21–32)
COLOR UR: YELLOW
CREAT SERPL-MCNC: 0.7 MG/DL (ref 0.6–1.2)
DEPRECATED RDW RBC AUTO: 14 % (ref 12.4–15.4)
EOSINOPHIL # BLD: 0.1 K/UL (ref 0–0.6)
EOSINOPHIL NFR BLD: 1.2 %
EPI CELLS #/AREA URNS AUTO: 8 /HPF (ref 0–5)
GFR SERPLBLD CREATININE-BSD FMLA CKD-EPI: >90 ML/MIN/{1.73_M2}
GLUCOSE SERPL-MCNC: 83 MG/DL (ref 70–99)
GLUCOSE UR STRIP.AUTO-MCNC: NEGATIVE MG/DL
HCT VFR BLD AUTO: 41 % (ref 36–48)
HGB BLD-MCNC: 13.4 G/DL (ref 12–16)
HGB UR QL STRIP.AUTO: NEGATIVE
HYALINE CASTS #/AREA URNS AUTO: 0 /LPF (ref 0–8)
KETONES UR STRIP.AUTO-MCNC: NEGATIVE MG/DL
LEUKOCYTE ESTERASE UR QL STRIP.AUTO: ABNORMAL
LYMPHOCYTES # BLD: 2.1 K/UL (ref 1–5.1)
LYMPHOCYTES NFR BLD: 38.1 %
MCH RBC QN AUTO: 30 PG (ref 26–34)
MCHC RBC AUTO-ENTMCNC: 32.6 G/DL (ref 31–36)
MCV RBC AUTO: 91.8 FL (ref 80–100)
MONOCYTES # BLD: 0.5 K/UL (ref 0–1.3)
MONOCYTES NFR BLD: 8.6 %
NEUTROPHILS # BLD: 2.8 K/UL (ref 1.7–7.7)
NEUTROPHILS NFR BLD: 51.6 %
NITRITE UR QL STRIP.AUTO: NEGATIVE
PH UR STRIP.AUTO: 6 [PH] (ref 5–8)
PLATELET # BLD AUTO: 302 K/UL (ref 135–450)
PMV BLD AUTO: 7.7 FL (ref 5–10.5)
POTASSIUM SERPL-SCNC: 4.4 MMOL/L (ref 3.5–5.1)
PROT SERPL-MCNC: 7 G/DL (ref 6.4–8.2)
PROT UR STRIP.AUTO-MCNC: ABNORMAL MG/DL
RBC # BLD AUTO: 4.47 M/UL (ref 4–5.2)
RBC CLUMPS #/AREA URNS AUTO: 1 /HPF (ref 0–4)
SODIUM SERPL-SCNC: 141 MMOL/L (ref 136–145)
SP GR UR STRIP.AUTO: 1.02 (ref 1–1.03)
UA COMPLETE W REFLEX CULTURE PNL UR: ABNORMAL
UA DIPSTICK W REFLEX MICRO PNL UR: YES
URN SPEC COLLECT METH UR: ABNORMAL
UROBILINOGEN UR STRIP-ACNC: 1 E.U./DL
WBC # BLD AUTO: 5.4 K/UL (ref 4–11)
WBC #/AREA URNS AUTO: 3 /HPF (ref 0–5)

## 2025-04-09 ENCOUNTER — OFFICE VISIT (OUTPATIENT)
Dept: INTERNAL MEDICINE CLINIC | Age: 60
End: 2025-04-09
Payer: COMMERCIAL

## 2025-04-09 VITALS
HEART RATE: 70 BPM | WEIGHT: 199.4 LBS | DIASTOLIC BLOOD PRESSURE: 80 MMHG | TEMPERATURE: 98.6 F | HEIGHT: 61 IN | OXYGEN SATURATION: 100 % | BODY MASS INDEX: 37.65 KG/M2 | SYSTOLIC BLOOD PRESSURE: 120 MMHG

## 2025-04-09 DIAGNOSIS — E78.49 OTHER HYPERLIPIDEMIA: ICD-10-CM

## 2025-04-09 DIAGNOSIS — E66.9 OBESITY (BMI 30-39.9): ICD-10-CM

## 2025-04-09 DIAGNOSIS — Z12.4 SCREENING FOR CERVICAL CANCER: ICD-10-CM

## 2025-04-09 DIAGNOSIS — Z23 NEED FOR VACCINATION FOR PNEUMOCOCCUS: ICD-10-CM

## 2025-04-09 DIAGNOSIS — J32.8 OTHER CHRONIC SINUSITIS: ICD-10-CM

## 2025-04-09 DIAGNOSIS — E55.9 VITAMIN D DEFICIENCY: ICD-10-CM

## 2025-04-09 DIAGNOSIS — R68.89 COLD SENSITIVITY: ICD-10-CM

## 2025-04-09 DIAGNOSIS — Z00.00 PHYSICAL EXAM, ANNUAL: Primary | ICD-10-CM

## 2025-04-09 PROCEDURE — 90677 PCV20 VACCINE IM: CPT | Performed by: INTERNAL MEDICINE

## 2025-04-09 PROCEDURE — 90471 IMMUNIZATION ADMIN: CPT | Performed by: INTERNAL MEDICINE

## 2025-04-09 PROCEDURE — 99396 PREV VISIT EST AGE 40-64: CPT | Performed by: INTERNAL MEDICINE

## 2025-04-09 NOTE — PROGRESS NOTES
SUBJECTIVE:  Modesta Rios is a 59 y.o. female HERE FOR   Chief Complaint   Patient presents with    Annual Exam        PT HERE FOR EVAL / PHYSICAL EXAM    NEEDS PHYSICAL EXAM     C/O COLD SENSITIVITY - HANDS. ? DURATION. NO PAIN, NO SWELLING  HLP - NOT ON MED. ? EXERCISE / DIET COMPLIANCE .  PREVIOUS LAB D/W PT   VIT D DEF -  ? MED COMPLIANCE. NOT AT GOAL -  LABS D/W PT  SINUSITIS - NO NASAL CONGESTION, NO POSTNASAL DRAINAGE, NO SINUS PRESSURE,  NO HA, NO SNEEZING, NO WATERY ITCHY EYES  OBESITY - DIET / EXERCISE REVIEWED. WT NOTED  SCREENING CERVICAL CA  NEEDS PNEUMOCOCCAL VACCINE     DENIES CP,  No SOB, NO PALPITATIONS, NO COUGH, NO F/C  No ABD PAIN, No N/V, No DIARRHEA, No CONSTIPATION, No MELENA, No HEMATOCHEZIA.  No DYSURIA, No FREQ, No URGENCY, No HEMATURIA    PMH: REVIEWED AND UPDATED TODAY    PSH: REVIEWED AND UPDATED TODAY    SOCIAL HX: REVIEWED AND UPDATED TODAY    FAMILY HX: REVIEWED AND UPDATED TODAY    ALLERGY:  Sulfa antibiotics    MEDS: REVIEWED  Prior to Visit Medications    Medication Sig Taking? Authorizing Provider   vitamin D (VITAMIN D3) 50 MCG (2000 UT) CAPS capsule Take 1 capsule by mouth daily Yes Pamela Miguel MD   fluticasone (FLONASE) 50 MCG/ACT nasal spray SHAKE LIQUID AND USE 2 SPRAYS IN EACH NOSTRIL DAILY AS NEEDED FOR SINUSITIS Yes Charissa Ferrari APRN   senna (SENOKOT) 8.6 MG tablet Take 1 tablet by mouth daily Yes Fab Lagos MD   Multiple Vitamins-Minerals (THERAPEUTIC MULTIVITAMIN-MINERALS) tablet Take 1 tablet by mouth daily Yes Fab [place] MD   Chromium Picolinate 200 MCG CAPS Take by mouth 2 times daily  Yes Fab [place]os MD   meloxicam (MOBIC) 15 MG tablet Take 1 tablet by mouth daily  Patient not taking: Reported on 4/9/2025  Yusuf Bruno MD   fluocinonide (LIDEX) 0.05 % cream Apply topically 2 times daily / PRN.  Patient not taking: Reported on 4/9/2025  Pamela Miguel MD       ROS: COMPREHENSIVE ROS AS IN HX, REST -VE  History obtained

## 2025-04-09 NOTE — PATIENT INSTRUCTIONS
TAKE MED AS ADVISED    DIET/ EXERCISE.    FOLLOW UP WITHIN 6 MONTHS / AS NEEDED    [facility], [facility]    [facility] Laboratory Locations - No appointment necessary.  ? indicates the location is open Saturdays in addition to Monday through Friday.   Call your preferred location for test preparation, business hours and other information you need.   [facility] accepts all insurances.  CENTRAL  EAST  [place]    ? [place]   [address]   [address]   [place] [address]    Ph: [phone]  [facility]   [address] [place] [address]    Ph: [phone]   ? [place]   [address]    [place] [address]    Ph: [phone]     [facility]   [address] [place] [address]    Ph: [phone] ? [place]   [address] [address]   Ph: [phone]  ? [facility]   [address] [place] [address]    Ph: [phone]      [place]   [address] [place] [address]   Ph: [phone]     [facility]  [address] [place] [address]   Ph: [phone]    [place]    ? [facility]   [address] [place] [address]    Ph: [phone]  [facility]   [address] [place] [address]   Ph: [phone]  [place]   [address]   [facility], [address]    PH: [phone]    [facility]   [address] [place] [address]    Ph: [phone]  [place]  [address] [place] [address]  Ph: [phone]  [facility]   [address] [place] [address]    Ph: [phone]

## 2025-04-17 DIAGNOSIS — E55.9 VITAMIN D DEFICIENCY: ICD-10-CM

## 2025-04-17 DIAGNOSIS — E78.49 OTHER HYPERLIPIDEMIA: ICD-10-CM

## 2025-04-17 DIAGNOSIS — R68.89 COLD SENSITIVITY: ICD-10-CM

## 2025-04-17 DIAGNOSIS — J01.00 ACUTE MAXILLARY SINUSITIS, RECURRENCE NOT SPECIFIED: ICD-10-CM

## 2025-04-17 DIAGNOSIS — Z00.00 PHYSICAL EXAM, ANNUAL: ICD-10-CM

## 2025-04-17 LAB
25(OH)D3 SERPL-MCNC: 32.8 NG/ML
ALBUMIN SERPL-MCNC: 4.2 G/DL (ref 3.4–5)
ALBUMIN/GLOB SERPL: 1.8 {RATIO} (ref 1.1–2.2)
ALP SERPL-CCNC: 49 U/L (ref 40–129)
ALT SERPL-CCNC: 26 U/L (ref 10–40)
ANION GAP SERPL CALCULATED.3IONS-SCNC: 10 MMOL/L (ref 3–16)
AST SERPL-CCNC: 27 U/L (ref 15–37)
BACTERIA URNS QL MICRO: ABNORMAL /HPF
BASOPHILS # BLD: 0 K/UL (ref 0–0.2)
BASOPHILS NFR BLD: 0.8 %
BILIRUB SERPL-MCNC: 0.4 MG/DL (ref 0–1)
BILIRUB UR QL STRIP.AUTO: NEGATIVE
BUN SERPL-MCNC: 13 MG/DL (ref 7–20)
CALCIUM SERPL-MCNC: 9.6 MG/DL (ref 8.3–10.6)
CHLORIDE SERPL-SCNC: 105 MMOL/L (ref 99–110)
CHOLEST SERPL-MCNC: 196 MG/DL (ref 0–199)
CLARITY UR: CLEAR
CO2 SERPL-SCNC: 26 MMOL/L (ref 21–32)
COLOR UR: YELLOW
CREAT SERPL-MCNC: 0.6 MG/DL (ref 0.6–1.2)
DEPRECATED RDW RBC AUTO: 13.8 % (ref 12.4–15.4)
EOSINOPHIL # BLD: 0.1 K/UL (ref 0–0.6)
EOSINOPHIL NFR BLD: 1.2 %
EPI CELLS #/AREA URNS AUTO: 6 /HPF (ref 0–5)
EST. AVERAGE GLUCOSE BLD GHB EST-MCNC: 105.4 MG/DL
FOLATE SERPL-MCNC: 17.6 NG/ML (ref 4.78–24.2)
GFR SERPLBLD CREATININE-BSD FMLA CKD-EPI: >90 ML/MIN/{1.73_M2}
GLUCOSE SERPL-MCNC: 73 MG/DL (ref 70–99)
GLUCOSE UR STRIP.AUTO-MCNC: NEGATIVE MG/DL
HBA1C MFR BLD: 5.3 %
HCT VFR BLD AUTO: 37.7 % (ref 36–48)
HDLC SERPL-MCNC: 63 MG/DL (ref 40–60)
HGB BLD-MCNC: 12.5 G/DL (ref 12–16)
HGB UR QL STRIP.AUTO: NEGATIVE
HYALINE CASTS #/AREA URNS AUTO: 0 /LPF (ref 0–8)
KETONES UR STRIP.AUTO-MCNC: NEGATIVE MG/DL
LDLC SERPL CALC-MCNC: 120 MG/DL
LEUKOCYTE ESTERASE UR QL STRIP.AUTO: ABNORMAL
LYMPHOCYTES # BLD: 2 K/UL (ref 1–5.1)
LYMPHOCYTES NFR BLD: 35 %
MCH RBC QN AUTO: 30.1 PG (ref 26–34)
MCHC RBC AUTO-ENTMCNC: 33.1 G/DL (ref 31–36)
MCV RBC AUTO: 91 FL (ref 80–100)
MONOCYTES # BLD: 0.9 K/UL (ref 0–1.3)
MONOCYTES NFR BLD: 16.2 %
NEUTROPHILS # BLD: 2.6 K/UL (ref 1.7–7.7)
NEUTROPHILS NFR BLD: 46.8 %
NITRITE UR QL STRIP.AUTO: NEGATIVE
PH UR STRIP.AUTO: 7 [PH] (ref 5–8)
PLATELET # BLD AUTO: 302 K/UL (ref 135–450)
PMV BLD AUTO: 7.6 FL (ref 5–10.5)
POTASSIUM SERPL-SCNC: 4.2 MMOL/L (ref 3.5–5.1)
PROT SERPL-MCNC: 6.5 G/DL (ref 6.4–8.2)
PROT UR STRIP.AUTO-MCNC: NEGATIVE MG/DL
RBC # BLD AUTO: 4.15 M/UL (ref 4–5.2)
RBC CLUMPS #/AREA URNS AUTO: 1 /HPF (ref 0–4)
SODIUM SERPL-SCNC: 141 MMOL/L (ref 136–145)
SP GR UR STRIP.AUTO: 1.02 (ref 1–1.03)
TRIGL SERPL-MCNC: 63 MG/DL (ref 0–150)
TSH SERPL DL<=0.005 MIU/L-ACNC: 1.31 UIU/ML (ref 0.27–4.2)
UA COMPLETE W REFLEX CULTURE PNL UR: ABNORMAL
UA DIPSTICK W REFLEX MICRO PNL UR: YES
URN SPEC COLLECT METH UR: ABNORMAL
UROBILINOGEN UR STRIP-ACNC: 1 E.U./DL
VIT B12 SERPL-MCNC: 790 PG/ML (ref 211–911)
VLDLC SERPL CALC-MCNC: 13 MG/DL
WBC # BLD AUTO: 5.6 K/UL (ref 4–11)
WBC #/AREA URNS AUTO: 1 /HPF (ref 0–5)

## 2025-04-17 RX ORDER — FLUTICASONE PROPIONATE 50 MCG
2 SPRAY, SUSPENSION (ML) NASAL DAILY PRN
Qty: 16 G | Refills: 1 | Status: SHIPPED | OUTPATIENT
Start: 2025-04-17

## 2025-04-17 NOTE — TELEPHONE ENCOUNTER
Medication:   Requested Prescriptions     Pending Prescriptions Disp Refills    fluticasone (FLONASE) 50 MCG/ACT nasal spray 16 g 1        Last Filled:      Patient Phone Number: [phone] (home) [phone] (work)    Last appt: 4/9/2025   Next appt: Visit date not found    Last OARRS:        No data to display

## 2025-07-03 ENCOUNTER — OFFICE VISIT (OUTPATIENT)
Dept: ORTHOPEDIC SURGERY | Age: 60
End: 2025-07-03

## 2025-07-03 VITALS — WEIGHT: 199 LBS | BODY MASS INDEX: 39.07 KG/M2 | HEIGHT: 60 IN

## 2025-07-03 DIAGNOSIS — M65.30 TRIGGER FINGER, ACQUIRED: Primary | ICD-10-CM

## 2025-07-03 RX ORDER — TRIAMCINOLONE ACETONIDE 40 MG/ML
20 INJECTION, SUSPENSION INTRA-ARTICULAR; INTRAMUSCULAR ONCE
Status: COMPLETED | OUTPATIENT
Start: 2025-07-03 | End: 2025-07-03

## 2025-07-03 RX ORDER — LIDOCAINE HYDROCHLORIDE 10 MG/ML
0.5 INJECTION, SOLUTION INFILTRATION; PERINEURAL ONCE
Status: COMPLETED | OUTPATIENT
Start: 2025-07-03 | End: 2025-07-03

## 2025-07-03 RX ADMIN — TRIAMCINOLONE ACETONIDE 20 MG: 40 INJECTION, SUSPENSION INTRA-ARTICULAR; INTRAMUSCULAR at 12:24

## 2025-07-03 RX ADMIN — LIDOCAINE HYDROCHLORIDE 0.5 ML: 10 INJECTION, SOLUTION INFILTRATION; PERINEURAL at 12:24

## 2025-07-03 NOTE — PROGRESS NOTES
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  Symptoms have been present for 5 weeks.  The digit is stiff, especially in the morning and will frequently stick in the palm when flexed and pop when extended.  This is often associated with pain.  Mild swelling has been noticed.  The patient denies discoloration or history of injury or overuse.  Treatment has not been prescribed.  The problem has worsened recently. The pain assessment has been reviewed and is correct as stated..    The patient's social history, past medical history, family history, medications, allergies and review of systems, entered 7/3/25, have been reviewed, and dated and are recorded in the chart.    On examination the patient is Height: 152.4 cm (5') tall and weighs Weight - Scale: 90.3 kg (199 lb). Respirations are 18 per minute.  The patient is well nourished, is oriented to time and place, demonstrates appropriate mood and affect as well as normal gait and station.  There is mild soft tissue swelling of the digit.  There is no deformity. There is tenderness, thickening and nodularity at the base of the flexor tendon sheath.  Range of motion is slightly limited in flexion and extension.  The digit sticks in flexion and pops into extension, accompanied by some pain. Skin is intact without lesions.  Distal circulation and sensation are intact.  Muscle strength and coordination are normal.  Reflexes are present bilaterally.  Joints are stable.  There are no subcutaneous nodules or enlarged epitrochlear lymph nodes.    I have personally reviewed and interpreted all previous external imaging studies (Xrays), laboratory tests (Lipid Panel, HbA1c, CMP, TSH+Reflex, CBC+Diff), diagnostic procedures and medical encounters pertinent to this patient's visit today.    Impression: Left little finger

## 2025-07-22 ENCOUNTER — OFFICE VISIT (OUTPATIENT)
Dept: ORTHOPEDIC SURGERY | Age: 60
End: 2025-07-22
Payer: COMMERCIAL

## 2025-07-22 VITALS — HEIGHT: 60 IN | BODY MASS INDEX: 39.07 KG/M2 | WEIGHT: 199 LBS

## 2025-07-22 DIAGNOSIS — M43.16 SPONDYLOLISTHESIS OF LUMBAR REGION: Primary | ICD-10-CM

## 2025-07-22 DIAGNOSIS — M48.061 SPINAL STENOSIS OF LUMBAR REGION, UNSPECIFIED WHETHER NEUROGENIC CLAUDICATION PRESENT: ICD-10-CM

## 2025-07-22 DIAGNOSIS — M51.362 DEGENERATION OF INTERVERTEBRAL DISC OF LUMBAR REGION WITH DISCOGENIC BACK PAIN AND LOWER EXTREMITY PAIN: ICD-10-CM

## 2025-07-22 PROCEDURE — 99214 OFFICE O/P EST MOD 30 MIN: CPT | Performed by: PHYSICIAN ASSISTANT

## 2025-07-22 RX ORDER — METHYLPREDNISOLONE 4 MG/1
TABLET ORAL
Qty: 1 KIT | Refills: 0 | Status: SHIPPED | OUTPATIENT
Start: 2025-07-22

## 2025-07-22 NOTE — PROGRESS NOTES
FOLLOW UP: SPINE    7/22/2025     CHIEF COMPLAINT:    Chief Complaint   Patient presents with    Follow-up     LUMBAR SPINE     LAST OV: 3/27/2024       HISTORY OF PRESENT ILLNESS:              The patient is a 59 y.o. female history glaucoma initially referred by Dr. Bubba Fernandez MD here to follow up for chronic worsening low back and radiating right leg pain.  She reports a history of chronic worsening aching low back pain extending into the right buttock, lateral thigh.  Her symptoms have been severe since last Saturday after showering.  She had a prior hip evaluation with Dr. Fernandez and was diagnosed with contributing right GTB.  Her back pain is increased with any prolonged inactivity such as sitting.  She reports some relief with standing.  Conservative care includes remote PHILIP with benefit, physical therapy, oral steroids, diclofenac, prior gabapentin, Mobic PRN.  She had a prior surgical consultation with Dr. Sainz who recommended continuing conservative care at that time.  She currently denies any contralateral radiating pain.  She denies any progressive numbness tingling or progressive extremity weakness.  She denies any recent bowel or bladder dysfunction or saddle anesthesia.  Denies any recent fevers chills or infections.    Last visit L MRI was recommended        The pain assessment was noted & reviewed in the medical record today.     Current/Past Treatment:   Physical Therapy: Yes with HEP  Chiropractic:     Injection:     9/15/2021 Right L4/5 transforaminal epidural injection - Dr. Weir (MANUEL) with benefit  Medications:            NSAIDS: MOBIC PRN             Muscle relaxer:              Steriods: Prednisone (past)            Neuropathic medications: Prior gabapentin            Opioids:            Other:   Surgery/Consult: Surgical consult with Dr. 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

## 2025-07-24 ENCOUNTER — TELEPHONE (OUTPATIENT)
Dept: ORTHOPEDIC SURGERY | Age: 60
End: 2025-07-24

## 2025-07-24 NOTE — TELEPHONE ENCOUNTER
Left a voicemail for Modesta Rios regarding MRI Lumbar Spine approval and authorization being valid until 9/22/2025.  Patient was instructed that their MRI needs to be scheduled at [facility] . The patient was instructed to contact the facility to schedule  at [phone].    A follow up appointment will need to be scheduled to review the results and treatment plan.     The patient was provided contact information should the need arise to reschedule any of the appointments.

## 2025-08-07 ENCOUNTER — RESULTS FOLLOW-UP (OUTPATIENT)
Dept: ORTHOPEDIC SURGERY | Age: 60
End: 2025-08-07

## 2025-08-15 ENCOUNTER — TELEPHONE (OUTPATIENT)
Dept: ORTHOPEDIC SURGERY | Age: 60
End: 2025-08-15

## 2025-08-18 ENCOUNTER — OFFICE VISIT (OUTPATIENT)
Dept: ORTHOPEDIC SURGERY | Age: 60
End: 2025-08-18
Payer: COMMERCIAL

## 2025-08-18 VITALS — WEIGHT: 199 LBS | HEIGHT: 60 IN | BODY MASS INDEX: 39.07 KG/M2

## 2025-08-18 DIAGNOSIS — M51.362 DEGENERATION OF INTERVERTEBRAL DISC OF LUMBAR REGION WITH DISCOGENIC BACK PAIN AND LOWER EXTREMITY PAIN: ICD-10-CM

## 2025-08-18 DIAGNOSIS — M48.061 SPINAL STENOSIS OF LUMBAR REGION, UNSPECIFIED WHETHER NEUROGENIC CLAUDICATION PRESENT: ICD-10-CM

## 2025-08-18 DIAGNOSIS — M43.16 SPONDYLOLISTHESIS OF LUMBAR REGION: Primary | ICD-10-CM

## 2025-08-18 PROCEDURE — 99214 OFFICE O/P EST MOD 30 MIN: CPT | Performed by: PHYSICIAN ASSISTANT

## 2025-08-18 RX ORDER — MELOXICAM 15 MG/1
15 TABLET ORAL DAILY PRN
Qty: 30 TABLET | Refills: 0 | Status: SHIPPED | OUTPATIENT
Start: 2025-08-18

## 2025-09-05 ENCOUNTER — HOSPITAL ENCOUNTER (OUTPATIENT)
Dept: INTERVENTIONAL RADIOLOGY/VASCULAR | Age: 60
Discharge: HOME | End: 2025-09-05
Payer: COMMERCIAL

## 2025-09-05 DIAGNOSIS — M48.061 SPINAL STENOSIS OF LUMBAR REGION WITHOUT NEUROGENIC CLAUDICATION: ICD-10-CM

## 2025-09-05 PROCEDURE — 62323 NJX INTERLAMINAR LMBR/SAC: CPT

## 2025-09-05 PROCEDURE — 6360000002 HC RX W HCPCS: Performed by: RADIOLOGY

## 2025-09-05 PROCEDURE — 6360000004 HC RX CONTRAST MEDICATION: Performed by: RADIOLOGY

## 2025-09-05 PROCEDURE — 2709999900 HC NON-CHARGEABLE SUPPLY

## 2025-09-05 RX ORDER — LIDOCAINE HYDROCHLORIDE 10 MG/ML
INJECTION, SOLUTION EPIDURAL; INFILTRATION; INTRACAUDAL; PERINEURAL PRN
Status: COMPLETED | OUTPATIENT
Start: 2025-09-05 | End: 2025-09-05

## 2025-09-05 RX ORDER — IOPAMIDOL 408 MG/ML
INJECTION, SOLUTION INTRATHECAL PRN
Status: COMPLETED | OUTPATIENT
Start: 2025-09-05 | End: 2025-09-05

## 2025-09-05 RX ORDER — BUPIVACAINE HYDROCHLORIDE 2.5 MG/ML
INJECTION, SOLUTION EPIDURAL; INFILTRATION; INTRACAUDAL; PERINEURAL PRN
Status: COMPLETED | OUTPATIENT
Start: 2025-09-05 | End: 2025-09-05

## 2025-09-05 RX ORDER — TRIAMCINOLONE ACETONIDE 40 MG/ML
INJECTION, SUSPENSION INTRA-ARTICULAR; INTRAMUSCULAR PRN
Status: COMPLETED | OUTPATIENT
Start: 2025-09-05 | End: 2025-09-05

## 2025-09-05 RX ADMIN — LIDOCAINE HYDROCHLORIDE 5 ML: 10 INJECTION, SOLUTION EPIDURAL; INFILTRATION; INTRACAUDAL; PERINEURAL at 09:32

## 2025-09-05 RX ADMIN — IOPAMIDOL 1 ML: 408 INJECTION, SOLUTION INTRATHECAL at 09:32

## 2025-09-05 RX ADMIN — BUPIVACAINE HYDROCHLORIDE 4 ML: 2.5 INJECTION, SOLUTION EPIDURAL; INFILTRATION; INTRACAUDAL at 09:33

## 2025-09-05 RX ADMIN — TRIAMCINOLONE ACETONIDE 80 MG: 40 INJECTION, SUSPENSION INTRA-ARTICULAR; INTRAMUSCULAR at 09:32
